# Patient Record
Sex: FEMALE | Race: WHITE | NOT HISPANIC OR LATINO | Employment: OTHER | URBAN - METROPOLITAN AREA
[De-identification: names, ages, dates, MRNs, and addresses within clinical notes are randomized per-mention and may not be internally consistent; named-entity substitution may affect disease eponyms.]

---

## 2015-01-16 LAB — CRC RECOMMENDATION EXT: NORMAL

## 2017-01-03 ENCOUNTER — HISTORICAL (OUTPATIENT)
Dept: URGENT CARE | Facility: CLINIC | Age: 67
End: 2017-01-03

## 2017-01-24 ENCOUNTER — HISTORICAL (OUTPATIENT)
Dept: LAB | Facility: HOSPITAL | Age: 67
End: 2017-01-24

## 2017-05-01 ENCOUNTER — HISTORICAL (OUTPATIENT)
Dept: LAB | Facility: HOSPITAL | Age: 67
End: 2017-05-01

## 2017-05-01 LAB
ALBUMIN SERPL-MCNC: 4.2 GM/DL (ref 3.4–5)
ALBUMIN/GLOB SERPL: 1.4 {RATIO}
ALP SERPL-CCNC: 68 UNIT/L (ref 38–126)
ALT SERPL-CCNC: 21 UNIT/L (ref 12–78)
AST SERPL-CCNC: 8 UNIT/L (ref 15–37)
BILIRUB SERPL-MCNC: 0.4 MG/DL (ref 0.2–1)
BILIRUBIN DIRECT+TOT PNL SERPL-MCNC: 0.1 MG/DL (ref 0–0.2)
BILIRUBIN DIRECT+TOT PNL SERPL-MCNC: 0.3 MG/DL (ref 0–0.8)
BUN SERPL-MCNC: 12 MG/DL (ref 7–18)
CALCIUM SERPL-MCNC: 8.9 MG/DL (ref 8.5–10.1)
CHLORIDE SERPL-SCNC: 107 MMOL/L (ref 98–107)
CHOLEST SERPL-MCNC: 179 MG/DL (ref 0–200)
CHOLEST/HDLC SERPL: 2.2 {RATIO} (ref 0–4)
CO2 SERPL-SCNC: 30 MMOL/L (ref 21–32)
CREAT SERPL-MCNC: 0.79 MG/DL (ref 0.55–1.02)
GLOBULIN SER-MCNC: 2.9 GM/DL (ref 2.4–3.5)
GLUCOSE SERPL-MCNC: 92 MG/DL (ref 74–106)
HDLC SERPL-MCNC: 81 MG/DL (ref 35–60)
LDLC SERPL CALC-MCNC: 78 MG/DL (ref 0–129)
POTASSIUM SERPL-SCNC: 4.5 MMOL/L (ref 3.5–5.1)
PROT SERPL-MCNC: 7.1 GM/DL (ref 6.4–8.2)
SODIUM SERPL-SCNC: 142 MMOL/L (ref 136–145)
TRIGL SERPL-MCNC: 102 MG/DL (ref 30–150)
VLDLC SERPL CALC-MCNC: 20 MG/DL

## 2017-09-07 ENCOUNTER — HISTORICAL (OUTPATIENT)
Dept: LAB | Facility: HOSPITAL | Age: 67
End: 2017-09-07

## 2017-09-07 LAB
ABS NEUT (OLG): 4.78 X10(3)/MCL (ref 2.1–9.2)
ALBUMIN SERPL-MCNC: 3.6 GM/DL (ref 3.4–5)
ALBUMIN/GLOB SERPL: 1 RATIO (ref 1.1–2)
ALP SERPL-CCNC: 79 UNIT/L (ref 38–126)
ALT SERPL-CCNC: 25 UNIT/L (ref 12–78)
AST SERPL-CCNC: 17 UNIT/L (ref 15–37)
BASOPHILS # BLD AUTO: 0 X10(3)/MCL (ref 0–0.2)
BASOPHILS NFR BLD AUTO: 0 %
BILIRUB SERPL-MCNC: 0.6 MG/DL (ref 0.2–1)
BILIRUBIN DIRECT+TOT PNL SERPL-MCNC: 0.1 MG/DL (ref 0–0.5)
BILIRUBIN DIRECT+TOT PNL SERPL-MCNC: 0.5 MG/DL (ref 0–0.8)
BUN SERPL-MCNC: 15 MG/DL (ref 7–18)
CALCIUM SERPL-MCNC: 9.1 MG/DL (ref 8.5–10.1)
CHLORIDE SERPL-SCNC: 104 MMOL/L (ref 98–107)
CHOLEST SERPL-MCNC: 258 MG/DL (ref 0–200)
CHOLEST/HDLC SERPL: 5.1 {RATIO} (ref 0–4)
CO2 SERPL-SCNC: 28 MMOL/L (ref 21–32)
CREAT SERPL-MCNC: 0.94 MG/DL (ref 0.55–1.02)
EOSINOPHIL # BLD AUTO: 0.2 X10(3)/MCL (ref 0–0.9)
EOSINOPHIL NFR BLD AUTO: 2 %
ERYTHROCYTE [DISTWIDTH] IN BLOOD BY AUTOMATED COUNT: 13.2 % (ref 11.5–17)
EST. AVERAGE GLUCOSE BLD GHB EST-MCNC: 163 MG/DL
GLOBULIN SER-MCNC: 3.7 GM/DL (ref 2.4–3.5)
GLUCOSE SERPL-MCNC: 132 MG/DL (ref 74–106)
HBA1C MFR BLD: 7.3 % (ref 4.2–6.3)
HCT VFR BLD AUTO: 38.6 % (ref 37–47)
HDLC SERPL-MCNC: 51 MG/DL (ref 35–60)
HGB BLD-MCNC: 12.3 GM/DL (ref 12–16)
LDLC SERPL CALC-MCNC: 167 MG/DL (ref 0–129)
LYMPHOCYTES # BLD AUTO: 2.7 X10(3)/MCL (ref 0.6–4.6)
LYMPHOCYTES NFR BLD AUTO: 33 %
MCH RBC QN AUTO: 31.3 PG (ref 27–31)
MCHC RBC AUTO-ENTMCNC: 31.9 GM/DL (ref 33–36)
MCV RBC AUTO: 98.2 FL (ref 80–94)
MONOCYTES # BLD AUTO: 0.5 X10(3)/MCL (ref 0.1–1.3)
MONOCYTES NFR BLD AUTO: 6 %
NEUTROPHILS # BLD AUTO: 4.78 X10(3)/MCL (ref 2.1–9.2)
NEUTROPHILS NFR BLD AUTO: 58 %
PLATELET # BLD AUTO: 191 X10(3)/MCL (ref 130–400)
PMV BLD AUTO: 8.8 FL (ref 9.4–12.4)
POTASSIUM SERPL-SCNC: 4.2 MMOL/L (ref 3.5–5.1)
PROT SERPL-MCNC: 7.3 GM/DL (ref 6.4–8.2)
RBC # BLD AUTO: 3.93 X10(6)/MCL (ref 4.2–5.4)
SODIUM SERPL-SCNC: 141 MMOL/L (ref 136–145)
TRIGL SERPL-MCNC: 200 MG/DL (ref 30–150)
TSH SERPL-ACNC: 1.05 MIU/ML (ref 0.36–3.74)
VLDLC SERPL CALC-MCNC: 40 MG/DL
WBC # SPEC AUTO: 8.2 X10(3)/MCL (ref 4.5–11.5)

## 2017-11-09 ENCOUNTER — HISTORICAL (OUTPATIENT)
Dept: LAB | Facility: HOSPITAL | Age: 67
End: 2017-11-09

## 2017-11-09 LAB
ABS NEUT (OLG): 3.99 X10(3)/MCL (ref 2.1–9.2)
ALBUMIN SERPL-MCNC: 4.4 GM/DL (ref 3.4–5)
ALBUMIN/GLOB SERPL: 1.4 {RATIO}
ALP SERPL-CCNC: 75 UNIT/L (ref 38–126)
ALT SERPL-CCNC: 38 UNIT/L (ref 12–78)
APPEARANCE, UA: CLEAR
AST SERPL-CCNC: 18 UNIT/L (ref 15–37)
BACTERIA SPEC CULT: ABNORMAL /HPF
BASOPHILS # BLD AUTO: 0 X10(3)/MCL (ref 0–0.2)
BASOPHILS NFR BLD AUTO: 0 %
BILIRUB SERPL-MCNC: 0.5 MG/DL (ref 0.2–1)
BILIRUB UR QL STRIP: NEGATIVE
BILIRUBIN DIRECT+TOT PNL SERPL-MCNC: 0.1 MG/DL (ref 0–0.2)
BILIRUBIN DIRECT+TOT PNL SERPL-MCNC: 0.4 MG/DL (ref 0–0.8)
BUN SERPL-MCNC: 14 MG/DL (ref 7–18)
CALCIUM SERPL-MCNC: 9.4 MG/DL (ref 8.5–10.1)
CHLORIDE SERPL-SCNC: 106 MMOL/L (ref 98–107)
CHOLEST SERPL-MCNC: 199 MG/DL (ref 0–200)
CHOLEST/HDLC SERPL: 2.2 {RATIO} (ref 0–4)
CO2 SERPL-SCNC: 27 MMOL/L (ref 21–32)
COLOR UR: YELLOW
CREAT SERPL-MCNC: 0.84 MG/DL (ref 0.55–1.02)
EOSINOPHIL # BLD AUTO: 0 X10(3)/MCL (ref 0–0.9)
EOSINOPHIL NFR BLD AUTO: 0 %
ERYTHROCYTE [DISTWIDTH] IN BLOOD BY AUTOMATED COUNT: 13.2 % (ref 11.5–17)
GLOBULIN SER-MCNC: 3.1 GM/DL (ref 2.4–3.5)
GLUCOSE (UA): NEGATIVE
GLUCOSE SERPL-MCNC: 93 MG/DL (ref 74–106)
HCT VFR BLD AUTO: 41.5 % (ref 37–47)
HDLC SERPL-MCNC: 90 MG/DL (ref 35–60)
HGB BLD-MCNC: 13.3 GM/DL (ref 12–16)
HGB UR QL STRIP: ABNORMAL
KETONES UR QL STRIP: NEGATIVE
LDLC SERPL CALC-MCNC: 86 MG/DL (ref 0–129)
LEUKOCYTE ESTERASE UR QL STRIP: NEGATIVE
LYMPHOCYTES # BLD AUTO: 1.9 X10(3)/MCL (ref 0.6–4.6)
LYMPHOCYTES NFR BLD AUTO: 31 %
MCH RBC QN AUTO: 28.2 PG (ref 27–31)
MCHC RBC AUTO-ENTMCNC: 32 GM/DL (ref 33–36)
MCV RBC AUTO: 88.1 FL (ref 80–94)
MONOCYTES # BLD AUTO: 0.3 X10(3)/MCL (ref 0.1–1.3)
MONOCYTES NFR BLD AUTO: 5 %
NEUTROPHILS # BLD AUTO: 3.99 X10(3)/MCL (ref 2.1–9.2)
NEUTROPHILS NFR BLD AUTO: 64 %
NITRITE UR QL STRIP: NEGATIVE
PH UR STRIP: 5.5 [PH] (ref 5–9)
PLATELET # BLD AUTO: 248 X10(3)/MCL (ref 130–400)
PMV BLD AUTO: 10.3 FL (ref 9.4–12.4)
POTASSIUM SERPL-SCNC: 4.2 MMOL/L (ref 3.5–5.1)
PROT SERPL-MCNC: 7.5 GM/DL (ref 6.4–8.2)
PROT UR QL STRIP: NEGATIVE
RBC # BLD AUTO: 4.71 X10(6)/MCL (ref 4.2–5.4)
RBC #/AREA URNS HPF: ABNORMAL /[HPF]
SODIUM SERPL-SCNC: 142 MMOL/L (ref 136–145)
SP GR UR STRIP: 1.01 (ref 1–1.03)
SQUAMOUS EPITHELIAL, UA: ABNORMAL
TRIGL SERPL-MCNC: 114 MG/DL (ref 30–150)
TSH SERPL-ACNC: 1.38 MIU/ML (ref 0.36–3.74)
UA WBC MAN: ABNORMAL
UROBILINOGEN UR STRIP-ACNC: 0.2
VLDLC SERPL CALC-MCNC: 23 MG/DL
WBC # SPEC AUTO: 6.3 X10(3)/MCL (ref 4.5–11.5)

## 2017-12-04 ENCOUNTER — HISTORICAL (OUTPATIENT)
Dept: LAB | Facility: HOSPITAL | Age: 67
End: 2017-12-04

## 2017-12-04 LAB
ABS NEUT (OLG): 3.12 X10(3)/MCL (ref 2.1–9.2)
BASOPHILS # BLD AUTO: 0 X10(3)/MCL (ref 0–0.2)
BASOPHILS NFR BLD AUTO: 0 %
CHOLEST SERPL-MCNC: 231 MG/DL (ref 0–200)
CHOLEST/HDLC SERPL: 4.9 {RATIO} (ref 0–4)
EOSINOPHIL # BLD AUTO: 0.2 X10(3)/MCL (ref 0–0.9)
EOSINOPHIL NFR BLD AUTO: 3 %
ERYTHROCYTE [DISTWIDTH] IN BLOOD BY AUTOMATED COUNT: 13.9 % (ref 11.5–17)
EST. AVERAGE GLUCOSE BLD GHB EST-MCNC: 128 MG/DL
HBA1C MFR BLD: 6.1 % (ref 4.2–6.3)
HCT VFR BLD AUTO: 38.4 % (ref 37–47)
HDLC SERPL-MCNC: 47 MG/DL (ref 35–60)
HGB BLD-MCNC: 12 GM/DL (ref 12–16)
LDLC SERPL CALC-MCNC: 160 MG/DL (ref 0–129)
LYMPHOCYTES # BLD AUTO: 2 X10(3)/MCL (ref 0.6–4.6)
LYMPHOCYTES NFR BLD AUTO: 35 %
MCH RBC QN AUTO: 30.9 PG (ref 27–31)
MCHC RBC AUTO-ENTMCNC: 31.3 GM/DL (ref 33–36)
MCV RBC AUTO: 99 FL (ref 80–94)
MONOCYTES # BLD AUTO: 0.6 X10(3)/MCL (ref 0.1–1.3)
MONOCYTES NFR BLD AUTO: 9 %
NEUTROPHILS # BLD AUTO: 3.12 X10(3)/MCL (ref 1.4–7.9)
NEUTROPHILS NFR BLD AUTO: 53 %
PLATELET # BLD AUTO: 176 X10(3)/MCL (ref 130–400)
PMV BLD AUTO: 8.8 FL (ref 9.4–12.4)
RBC # BLD AUTO: 3.88 X10(6)/MCL (ref 4.2–5.4)
TRIGL SERPL-MCNC: 120 MG/DL (ref 30–150)
VLDLC SERPL CALC-MCNC: 24 MG/DL
WBC # SPEC AUTO: 5.9 X10(3)/MCL (ref 4.5–11.5)

## 2018-03-05 ENCOUNTER — HISTORICAL (OUTPATIENT)
Dept: LAB | Facility: HOSPITAL | Age: 68
End: 2018-03-05

## 2018-04-06 ENCOUNTER — HISTORICAL (OUTPATIENT)
Dept: ADMINISTRATIVE | Facility: HOSPITAL | Age: 68
End: 2018-04-06

## 2018-05-09 ENCOUNTER — HISTORICAL (OUTPATIENT)
Dept: LAB | Facility: HOSPITAL | Age: 68
End: 2018-05-09

## 2018-05-09 LAB
EST. AVERAGE GLUCOSE BLD GHB EST-MCNC: 128 MG/DL
HBA1C MFR BLD: 6.1 % (ref 4.2–6.3)

## 2018-05-10 ENCOUNTER — HISTORICAL (OUTPATIENT)
Dept: LAB | Facility: HOSPITAL | Age: 68
End: 2018-05-10

## 2018-05-13 LAB — RAPID GROUP A STREP (OHS): POSITIVE

## 2018-06-01 ENCOUNTER — HISTORICAL (OUTPATIENT)
Dept: LAB | Facility: HOSPITAL | Age: 68
End: 2018-06-01

## 2018-07-16 ENCOUNTER — HISTORICAL (OUTPATIENT)
Dept: INFUSION THERAPY | Facility: HOSPITAL | Age: 68
End: 2018-07-16

## 2018-09-11 ENCOUNTER — HISTORICAL (OUTPATIENT)
Dept: LAB | Facility: HOSPITAL | Age: 68
End: 2018-09-11

## 2018-09-25 ENCOUNTER — HISTORICAL (OUTPATIENT)
Dept: HEMATOLOGY/ONCOLOGY | Facility: CLINIC | Age: 68
End: 2018-09-25

## 2018-11-16 ENCOUNTER — HISTORICAL (OUTPATIENT)
Dept: LAB | Facility: HOSPITAL | Age: 68
End: 2018-11-16

## 2018-11-16 LAB
ABS NEUT (OLG): 3.09 X10(3)/MCL (ref 2.1–9.2)
ALBUMIN SERPL-MCNC: 4.1 GM/DL (ref 3.4–5)
ALBUMIN/GLOB SERPL: 1.4 {RATIO}
ALP SERPL-CCNC: 76 UNIT/L (ref 38–126)
ALT SERPL-CCNC: 48 UNIT/L (ref 12–78)
APPEARANCE, UA: CLEAR
AST SERPL-CCNC: 18 UNIT/L (ref 15–37)
BACTERIA SPEC CULT: ABNORMAL /HPF
BASOPHILS # BLD AUTO: 0 X10(3)/MCL (ref 0–0.2)
BASOPHILS NFR BLD AUTO: 0 %
BILIRUB SERPL-MCNC: 0.4 MG/DL (ref 0.2–1)
BILIRUB UR QL STRIP: NEGATIVE
BILIRUBIN DIRECT+TOT PNL SERPL-MCNC: 0.1 MG/DL (ref 0–0.2)
BILIRUBIN DIRECT+TOT PNL SERPL-MCNC: 0.3 MG/DL (ref 0–0.8)
BUN SERPL-MCNC: 10 MG/DL (ref 7–18)
CALCIUM SERPL-MCNC: 8.9 MG/DL (ref 8.5–10.1)
CHLORIDE SERPL-SCNC: 109 MMOL/L (ref 98–107)
CHOLEST SERPL-MCNC: 172 MG/DL (ref 0–200)
CHOLEST/HDLC SERPL: 2.1 {RATIO} (ref 0–4)
CO2 SERPL-SCNC: 29 MMOL/L (ref 21–32)
COLOR UR: YELLOW
CREAT SERPL-MCNC: 0.86 MG/DL (ref 0.55–1.02)
EOSINOPHIL # BLD AUTO: 0 X10(3)/MCL (ref 0–0.9)
EOSINOPHIL NFR BLD AUTO: 0 %
ERYTHROCYTE [DISTWIDTH] IN BLOOD BY AUTOMATED COUNT: 14.3 % (ref 11.5–17)
GLOBULIN SER-MCNC: 2.9 GM/DL (ref 2.4–3.5)
GLUCOSE (UA): NEGATIVE
GLUCOSE SERPL-MCNC: 88 MG/DL (ref 74–106)
HCT VFR BLD AUTO: 40.9 % (ref 37–47)
HDLC SERPL-MCNC: 81 MG/DL (ref 35–60)
HGB BLD-MCNC: 12.7 GM/DL (ref 12–16)
HGB UR QL STRIP: ABNORMAL
KETONES UR QL STRIP: NEGATIVE
LDLC SERPL CALC-MCNC: 69 MG/DL (ref 0–129)
LEUKOCYTE ESTERASE UR QL STRIP: NEGATIVE
LYMPHOCYTES # BLD AUTO: 2 X10(3)/MCL (ref 0.6–4.6)
LYMPHOCYTES NFR BLD AUTO: 37 %
MCH RBC QN AUTO: 27.1 PG (ref 27–31)
MCHC RBC AUTO-ENTMCNC: 31.1 GM/DL (ref 33–36)
MCV RBC AUTO: 87.4 FL (ref 80–94)
MONOCYTES # BLD AUTO: 0.4 X10(3)/MCL (ref 0.1–1.3)
MONOCYTES NFR BLD AUTO: 6 %
NEUTROPHILS # BLD AUTO: 3.09 X10(3)/MCL (ref 2.1–9.2)
NEUTROPHILS NFR BLD AUTO: 56 %
NITRITE UR QL STRIP: NEGATIVE
PH UR STRIP: 7 [PH] (ref 5–9)
PLATELET # BLD AUTO: 253 X10(3)/MCL (ref 130–400)
PMV BLD AUTO: 10.9 FL (ref 9.4–12.4)
POTASSIUM SERPL-SCNC: 4.9 MMOL/L (ref 3.5–5.1)
PROT SERPL-MCNC: 7 GM/DL (ref 6.4–8.2)
PROT UR QL STRIP: NEGATIVE
RBC # BLD AUTO: 4.68 X10(6)/MCL (ref 4.2–5.4)
RBC #/AREA URNS HPF: ABNORMAL /[HPF]
SODIUM SERPL-SCNC: 142 MMOL/L (ref 136–145)
SP GR UR STRIP: 1.01 (ref 1–1.03)
SQUAMOUS EPITHELIAL, UA: 6 /HPF (ref 0–4)
TRIGL SERPL-MCNC: 111 MG/DL (ref 30–150)
TSH SERPL-ACNC: 1.35 MIU/L (ref 0.36–3.74)
UROBILINOGEN UR STRIP-ACNC: 0.2
VLDLC SERPL CALC-MCNC: 22 MG/DL
WBC # SPEC AUTO: 5.6 X10(3)/MCL (ref 4.5–11.5)
WBC #/AREA URNS HPF: ABNORMAL /[HPF]

## 2019-01-21 ENCOUNTER — HISTORICAL (OUTPATIENT)
Dept: LAB | Facility: HOSPITAL | Age: 69
End: 2019-01-21

## 2019-01-21 ENCOUNTER — HISTORICAL (OUTPATIENT)
Dept: ADMINISTRATIVE | Facility: HOSPITAL | Age: 69
End: 2019-01-21

## 2019-01-21 LAB
ALBUMIN SERPL-MCNC: 3.5 GM/DL (ref 3.4–5)
ALBUMIN/GLOB SERPL: 0.9 {RATIO}
ALP SERPL-CCNC: 68 UNIT/L (ref 38–126)
ALT SERPL-CCNC: 23 UNIT/L (ref 12–78)
APPEARANCE, UA: ABNORMAL
AST SERPL-CCNC: 13 UNIT/L (ref 15–37)
BACTERIA SPEC CULT: ABNORMAL /HPF
BILIRUB SERPL-MCNC: 0.5 MG/DL (ref 0.2–1)
BILIRUB UR QL STRIP: NEGATIVE
BILIRUBIN DIRECT+TOT PNL SERPL-MCNC: 0.1 MG/DL (ref 0–0.2)
BILIRUBIN DIRECT+TOT PNL SERPL-MCNC: 0.4 MG/DL (ref 0–0.8)
BUN SERPL-MCNC: 18 MG/DL (ref 7–18)
CALCIUM SERPL-MCNC: 9 MG/DL (ref 8.5–10.1)
CHLORIDE SERPL-SCNC: 105 MMOL/L (ref 98–107)
CO2 SERPL-SCNC: 29 MMOL/L (ref 21–32)
COLOR UR: YELLOW
CREAT SERPL-MCNC: 0.95 MG/DL (ref 0.55–1.02)
CREAT UR-MCNC: 106 MG/DL
EST. AVERAGE GLUCOSE BLD GHB EST-MCNC: 163 MG/DL
GLOBULIN SER-MCNC: 3.7 GM/DL (ref 2.4–3.5)
GLUCOSE (UA): NEGATIVE
GLUCOSE SERPL-MCNC: 123 MG/DL (ref 74–106)
HBA1C MFR BLD: 7.3 % (ref 4.2–6.3)
HGB UR QL STRIP: ABNORMAL
KETONES UR QL STRIP: NEGATIVE
LEUKOCYTE ESTERASE UR QL STRIP: ABNORMAL
MICROALBUMIN UR-MCNC: 2.3 MG/DL
MICROALBUMIN/CREAT RATIO PNL UR: 21.7 MG/GM CR (ref 0–30)
NITRITE UR QL STRIP: NEGATIVE
PH UR STRIP: 6 [PH] (ref 5–9)
POTASSIUM SERPL-SCNC: 4 MMOL/L (ref 3.5–5.1)
PROT SERPL-MCNC: 7.2 GM/DL (ref 6.4–8.2)
PROT UR QL STRIP: NEGATIVE
RBC #/AREA URNS HPF: 18 /HPF (ref 0–2)
SODIUM SERPL-SCNC: 142 MMOL/L (ref 136–145)
SP GR UR STRIP: 1.02 (ref 1–1.03)
SQUAMOUS EPITHELIAL, UA: 9 /HPF (ref 0–4)
TSH SERPL-ACNC: 0.16 MIU/L (ref 0.36–3.74)
UROBILINOGEN UR STRIP-ACNC: 1
WBC #/AREA URNS HPF: 105 /HPF (ref 0–3)

## 2019-02-05 ENCOUNTER — HISTORICAL (OUTPATIENT)
Dept: INFUSION THERAPY | Facility: HOSPITAL | Age: 69
End: 2019-02-05

## 2019-02-07 ENCOUNTER — HISTORICAL (OUTPATIENT)
Dept: RADIOLOGY | Facility: HOSPITAL | Age: 69
End: 2019-02-07

## 2019-02-21 ENCOUNTER — HISTORICAL (OUTPATIENT)
Dept: RADIOLOGY | Facility: HOSPITAL | Age: 69
End: 2019-02-21

## 2019-04-03 ENCOUNTER — HISTORICAL (OUTPATIENT)
Dept: ADMINISTRATIVE | Facility: HOSPITAL | Age: 69
End: 2019-04-03

## 2019-04-03 LAB
ABS NEUT (OLG): 4.07 X10(3)/MCL (ref 2.1–9.2)
ALBUMIN SERPL-MCNC: 3.7 GM/DL (ref 3.4–5)
ALBUMIN/GLOB SERPL: 1 RATIO (ref 1.1–2)
ALP SERPL-CCNC: 78 UNIT/L (ref 38–126)
ALT SERPL-CCNC: 26 UNIT/L (ref 12–78)
AST SERPL-CCNC: 17 UNIT/L (ref 15–37)
BASOPHILS # BLD AUTO: 0 X10(3)/MCL (ref 0–0.2)
BASOPHILS NFR BLD AUTO: 0.4 %
BILIRUB SERPL-MCNC: 0.6 MG/DL (ref 0.2–1)
BILIRUBIN DIRECT+TOT PNL SERPL-MCNC: 0.2 MG/DL (ref 0–0.5)
BILIRUBIN DIRECT+TOT PNL SERPL-MCNC: 0.4 MG/DL (ref 0–0.8)
BUN SERPL-MCNC: 23 MG/DL (ref 7–18)
CALCIUM SERPL-MCNC: 9.2 MG/DL (ref 8.5–10.1)
CHLORIDE SERPL-SCNC: 106 MMOL/L (ref 98–107)
CO2 SERPL-SCNC: 31 MMOL/L (ref 21–32)
CREAT SERPL-MCNC: 1.15 MG/DL (ref 0.55–1.02)
EOSINOPHIL # BLD AUTO: 0.1 X10(3)/MCL (ref 0–0.9)
EOSINOPHIL NFR BLD AUTO: 1.6 %
ERYTHROCYTE [DISTWIDTH] IN BLOOD BY AUTOMATED COUNT: 13.3 % (ref 11.5–17)
GLOBULIN SER-MCNC: 3.7 GM/DL (ref 2.4–3.5)
GLUCOSE SERPL-MCNC: 106 MG/DL (ref 74–106)
HCT VFR BLD AUTO: 38.9 % (ref 37–47)
HGB BLD-MCNC: 12.4 GM/DL (ref 12–16)
LYMPHOCYTES # BLD AUTO: 3.1 X10(3)/MCL (ref 0.6–4.6)
LYMPHOCYTES NFR BLD AUTO: 39.7 %
MCH RBC QN AUTO: 31.1 PG (ref 27–31)
MCHC RBC AUTO-ENTMCNC: 31.9 GM/DL (ref 33–36)
MCV RBC AUTO: 97.5 FL (ref 80–94)
MONOCYTES # BLD AUTO: 0.5 X10(3)/MCL (ref 0.1–1.3)
MONOCYTES NFR BLD AUTO: 6.6 %
NEUTROPHILS # BLD AUTO: 4.1 X10(3)/MCL (ref 2.1–9.2)
NEUTROPHILS NFR BLD AUTO: 51.6 %
PLATELET # BLD AUTO: 230 X10(3)/MCL (ref 130–400)
PMV BLD AUTO: 8.6 FL (ref 9.4–12.4)
POTASSIUM SERPL-SCNC: 4 MMOL/L (ref 3.5–5.1)
PROT SERPL-MCNC: 7.4 GM/DL (ref 6.4–8.2)
RBC # BLD AUTO: 3.99 X10(6)/MCL (ref 4.2–5.4)
SODIUM SERPL-SCNC: 141 MMOL/L (ref 136–145)
WBC # SPEC AUTO: 7.9 X10(3)/MCL (ref 4.5–11.5)

## 2019-05-17 ENCOUNTER — HISTORICAL (OUTPATIENT)
Dept: LAB | Facility: HOSPITAL | Age: 69
End: 2019-05-17

## 2019-05-17 LAB
ALBUMIN SERPL-MCNC: 4.3 GM/DL (ref 3.4–5)
ALBUMIN/GLOB SERPL: 1.3 RATIO (ref 1.1–2)
ALP SERPL-CCNC: 74 UNIT/L (ref 38–126)
ALT SERPL-CCNC: 55 UNIT/L (ref 12–78)
AST SERPL-CCNC: 22 UNIT/L (ref 15–37)
BILIRUB SERPL-MCNC: 0.5 MG/DL (ref 0.2–1)
BILIRUBIN DIRECT+TOT PNL SERPL-MCNC: 0.1 MG/DL (ref 0–0.5)
BILIRUBIN DIRECT+TOT PNL SERPL-MCNC: 0.4 MG/DL (ref 0–0.8)
BUN SERPL-MCNC: 12 MG/DL (ref 7–18)
CALCIUM SERPL-MCNC: 10.1 MG/DL (ref 8.5–10.1)
CHLORIDE SERPL-SCNC: 110 MMOL/L (ref 98–107)
CHOLEST SERPL-MCNC: 208 MG/DL (ref 0–200)
CHOLEST/HDLC SERPL: 2.5 {RATIO} (ref 0–4)
CO2 SERPL-SCNC: 31 MMOL/L (ref 21–32)
CREAT SERPL-MCNC: 1.01 MG/DL (ref 0.55–1.02)
GLOBULIN SER-MCNC: 3.2 GM/DL (ref 2.4–3.5)
GLUCOSE SERPL-MCNC: 102 MG/DL (ref 74–106)
HDLC SERPL-MCNC: 83 MG/DL (ref 35–60)
LDLC SERPL CALC-MCNC: 98 MG/DL (ref 0–129)
POTASSIUM SERPL-SCNC: 5.8 MMOL/L (ref 3.5–5.1)
PROT SERPL-MCNC: 7.5 GM/DL (ref 6.4–8.2)
SODIUM SERPL-SCNC: 148 MMOL/L (ref 136–145)
TRIGL SERPL-MCNC: 137 MG/DL (ref 30–150)
VLDLC SERPL CALC-MCNC: 27 MG/DL

## 2019-07-23 ENCOUNTER — HISTORICAL (OUTPATIENT)
Dept: RADIOLOGY | Facility: HOSPITAL | Age: 69
End: 2019-07-23

## 2019-10-08 ENCOUNTER — HISTORICAL (OUTPATIENT)
Dept: INFUSION THERAPY | Facility: HOSPITAL | Age: 69
End: 2019-10-08

## 2019-11-20 ENCOUNTER — HISTORICAL (OUTPATIENT)
Dept: LAB | Facility: HOSPITAL | Age: 69
End: 2019-11-20

## 2019-11-20 LAB
ABS NEUT (OLG): 3.24 X10(3)/MCL (ref 2.1–9.2)
ALBUMIN SERPL-MCNC: 4.1 GM/DL (ref 3.4–5)
ALBUMIN/GLOB SERPL: 1.4 RATIO (ref 1.1–2)
ALP SERPL-CCNC: 83 UNIT/L (ref 38–126)
ALT SERPL-CCNC: 42 UNIT/L (ref 12–78)
APPEARANCE, UA: CLEAR
AST SERPL-CCNC: 19 UNIT/L (ref 15–37)
BACTERIA SPEC CULT: ABNORMAL /HPF
BASOPHILS # BLD AUTO: 0 X10(3)/MCL (ref 0–0.2)
BASOPHILS NFR BLD AUTO: 0 %
BILIRUB SERPL-MCNC: 0.4 MG/DL (ref 0.2–1)
BILIRUB UR QL STRIP: NEGATIVE
BILIRUBIN DIRECT+TOT PNL SERPL-MCNC: 0.1 MG/DL (ref 0–0.5)
BILIRUBIN DIRECT+TOT PNL SERPL-MCNC: 0.3 MG/DL (ref 0–0.8)
BUN SERPL-MCNC: 11 MG/DL (ref 7–18)
CALCIUM SERPL-MCNC: 9 MG/DL (ref 8.5–10.1)
CHLORIDE SERPL-SCNC: 110 MMOL/L (ref 98–107)
CHOLEST SERPL-MCNC: 201 MG/DL (ref 0–200)
CHOLEST/HDLC SERPL: 2.5 {RATIO} (ref 0–4)
CO2 SERPL-SCNC: 25 MMOL/L (ref 21–32)
COLOR UR: YELLOW
CREAT SERPL-MCNC: 0.73 MG/DL (ref 0.55–1.02)
EOSINOPHIL # BLD AUTO: 0 X10(3)/MCL (ref 0–0.9)
EOSINOPHIL NFR BLD AUTO: 1 %
ERYTHROCYTE [DISTWIDTH] IN BLOOD BY AUTOMATED COUNT: 13.8 % (ref 11.5–17)
GLOBULIN SER-MCNC: 3 GM/DL (ref 2.4–3.5)
GLUCOSE (UA): NEGATIVE
GLUCOSE SERPL-MCNC: 91 MG/DL (ref 74–106)
HCT VFR BLD AUTO: 44.1 % (ref 37–47)
HDLC SERPL-MCNC: 81 MG/DL (ref 35–60)
HGB BLD-MCNC: 13.3 GM/DL (ref 12–16)
HGB UR QL STRIP: NEGATIVE
KETONES UR QL STRIP: NEGATIVE
LDLC SERPL CALC-MCNC: 94 MG/DL (ref 0–129)
LEUKOCYTE ESTERASE UR QL STRIP: NEGATIVE
LYMPHOCYTES # BLD AUTO: 1.9 X10(3)/MCL (ref 0.6–4.6)
LYMPHOCYTES NFR BLD AUTO: 34 %
MCH RBC QN AUTO: 27.5 PG (ref 27–31)
MCHC RBC AUTO-ENTMCNC: 30.2 GM/DL (ref 33–36)
MCV RBC AUTO: 91.3 FL (ref 80–94)
MONOCYTES # BLD AUTO: 0.3 X10(3)/MCL (ref 0.1–1.3)
MONOCYTES NFR BLD AUTO: 5 %
NEUTROPHILS # BLD AUTO: 3.24 X10(3)/MCL (ref 2.1–9.2)
NEUTROPHILS NFR BLD AUTO: 59 %
NITRITE UR QL STRIP: NEGATIVE
PH UR STRIP: 6 [PH] (ref 5–9)
PLATELET # BLD AUTO: 276 X10(3)/MCL (ref 130–400)
PMV BLD AUTO: 11.1 FL (ref 9.4–12.4)
POTASSIUM SERPL-SCNC: 4.3 MMOL/L (ref 3.5–5.1)
PROT SERPL-MCNC: 7.1 GM/DL (ref 6.4–8.2)
PROT UR QL STRIP: NEGATIVE
RBC # BLD AUTO: 4.83 X10(6)/MCL (ref 4.2–5.4)
RBC #/AREA URNS HPF: ABNORMAL /[HPF]
SODIUM SERPL-SCNC: 142 MMOL/L (ref 136–145)
SP GR UR STRIP: 1.01 (ref 1–1.03)
SQUAMOUS EPITHELIAL, UA: 6 /HPF (ref 0–4)
TRIGL SERPL-MCNC: 130 MG/DL (ref 30–150)
TSH SERPL-ACNC: 1.28 MIU/L (ref 0.36–3.74)
UROBILINOGEN UR STRIP-ACNC: 0.2
VLDLC SERPL CALC-MCNC: 26 MG/DL
WBC # SPEC AUTO: 5.5 X10(3)/MCL (ref 4.5–11.5)
WBC #/AREA URNS HPF: ABNORMAL /[HPF]

## 2020-11-17 ENCOUNTER — HISTORICAL (OUTPATIENT)
Dept: ADMINISTRATIVE | Facility: HOSPITAL | Age: 70
End: 2020-11-17

## 2020-11-17 LAB
ABS NEUT (OLG): 2.85 X10(3)/MCL (ref 2.1–9.2)
ALBUMIN SERPL-MCNC: 4 GM/DL (ref 3.4–4.8)
ALBUMIN/GLOB SERPL: 1.6 RATIO (ref 1.1–2)
ALP SERPL-CCNC: 65 UNIT/L (ref 40–150)
ALT SERPL-CCNC: 22 UNIT/L (ref 0–55)
APPEARANCE, UA: ABNORMAL
AST SERPL-CCNC: 12 UNIT/L (ref 5–34)
BACTERIA SPEC CULT: ABNORMAL /HPF
BASOPHILS # BLD AUTO: 0 X10(3)/MCL (ref 0–0.2)
BASOPHILS NFR BLD AUTO: 1 %
BILIRUB SERPL-MCNC: 0.3 MG/DL
BILIRUB UR QL STRIP: NEGATIVE
BILIRUBIN DIRECT+TOT PNL SERPL-MCNC: 0.1 MG/DL (ref 0–0.5)
BILIRUBIN DIRECT+TOT PNL SERPL-MCNC: 0.2 MG/DL (ref 0–0.8)
BUN SERPL-MCNC: 11.4 MG/DL (ref 9.8–20.1)
CALCIUM SERPL-MCNC: 8.6 MG/DL (ref 8.4–10.2)
CHLORIDE SERPL-SCNC: 107 MMOL/L (ref 98–107)
CHOLEST SERPL-MCNC: 188 MG/DL
CHOLEST/HDLC SERPL: 3 {RATIO} (ref 0–5)
CO2 SERPL-SCNC: 27 MMOL/L (ref 23–31)
COLOR UR: YELLOW
CREAT SERPL-MCNC: 0.86 MG/DL (ref 0.55–1.02)
EOSINOPHIL # BLD AUTO: 0.1 X10(3)/MCL (ref 0–0.9)
EOSINOPHIL NFR BLD AUTO: 1 %
ERYTHROCYTE [DISTWIDTH] IN BLOOD BY AUTOMATED COUNT: 15.1 % (ref 11.5–17)
GLOBULIN SER-MCNC: 2.5 GM/DL (ref 2.4–3.5)
GLUCOSE (UA): NEGATIVE
GLUCOSE SERPL-MCNC: 110 MG/DL (ref 82–115)
HCT VFR BLD AUTO: 38.6 % (ref 37–47)
HCV AB SERPL QL IA: NONREACTIVE
HDLC SERPL-MCNC: 66 MG/DL (ref 35–60)
HGB BLD-MCNC: 11.7 GM/DL (ref 12–16)
HGB UR QL STRIP: NEGATIVE
KETONES UR QL STRIP: NEGATIVE
LDLC SERPL CALC-MCNC: 98 MG/DL (ref 50–140)
LEUKOCYTE ESTERASE UR QL STRIP: NEGATIVE
LYMPHOCYTES # BLD AUTO: 1.8 X10(3)/MCL (ref 0.6–4.6)
LYMPHOCYTES NFR BLD AUTO: 34 %
MCH RBC QN AUTO: 27.3 PG (ref 27–31)
MCHC RBC AUTO-ENTMCNC: 30.3 GM/DL (ref 33–36)
MCV RBC AUTO: 90.2 FL (ref 80–94)
MONOCYTES # BLD AUTO: 0.4 X10(3)/MCL (ref 0.1–1.3)
MONOCYTES NFR BLD AUTO: 8 %
NEUTROPHILS # BLD AUTO: 2.85 X10(3)/MCL (ref 2.1–9.2)
NEUTROPHILS NFR BLD AUTO: 56 %
NITRITE UR QL STRIP: NEGATIVE
PH UR STRIP: 7 [PH] (ref 5–9)
PLATELET # BLD AUTO: 260 X10(3)/MCL (ref 130–400)
PMV BLD AUTO: 10.2 FL (ref 9.4–12.4)
POTASSIUM SERPL-SCNC: 4.6 MMOL/L (ref 3.5–5.1)
PROT SERPL-MCNC: 6.5 GM/DL (ref 5.8–7.6)
PROT UR QL STRIP: NEGATIVE
RBC # BLD AUTO: 4.28 X10(6)/MCL (ref 4.2–5.4)
RBC #/AREA URNS HPF: ABNORMAL /[HPF]
SODIUM SERPL-SCNC: 142 MMOL/L (ref 136–145)
SP GR UR STRIP: 1.02 (ref 1–1.03)
SQUAMOUS EPITHELIAL, UA: 20 /HPF (ref 0–4)
TRIGL SERPL-MCNC: 120 MG/DL (ref 37–140)
TSH SERPL-ACNC: 1.97 UIU/ML (ref 0.35–4.94)
UROBILINOGEN UR STRIP-ACNC: 0.2
VLDLC SERPL CALC-MCNC: 24 MG/DL
WBC # SPEC AUTO: 5.1 X10(3)/MCL (ref 4.5–11.5)
WBC #/AREA URNS HPF: ABNORMAL /[HPF]

## 2020-12-01 ENCOUNTER — HISTORICAL (OUTPATIENT)
Dept: ADMINISTRATIVE | Facility: HOSPITAL | Age: 70
End: 2020-12-01

## 2020-12-03 LAB — FINAL CULTURE: NORMAL

## 2020-12-04 ENCOUNTER — HISTORICAL (OUTPATIENT)
Dept: RADIOLOGY | Facility: HOSPITAL | Age: 70
End: 2020-12-04

## 2020-12-29 ENCOUNTER — HISTORICAL (OUTPATIENT)
Dept: INFUSION THERAPY | Facility: HOSPITAL | Age: 70
End: 2020-12-29

## 2021-01-05 ENCOUNTER — HISTORICAL (OUTPATIENT)
Dept: RADIOLOGY | Facility: HOSPITAL | Age: 71
End: 2021-01-05

## 2021-04-08 LAB
LEFT EYE DM RETINOPATHY: NEGATIVE
RIGHT EYE DM RETINOPATHY: NEGATIVE

## 2021-04-12 ENCOUNTER — HISTORICAL (OUTPATIENT)
Dept: ADMINISTRATIVE | Facility: HOSPITAL | Age: 71
End: 2021-04-12

## 2021-04-12 LAB — SARS-COV-2 RNA RESP QL NAA+PROBE: NOT DETECTED

## 2021-04-27 ENCOUNTER — HISTORICAL (OUTPATIENT)
Dept: LAB | Facility: HOSPITAL | Age: 71
End: 2021-04-27

## 2021-04-27 LAB
ABS NEUT (OLG): 6.17 X10(3)/MCL (ref 2.1–9.2)
ALBUMIN SERPL-MCNC: 4.1 GM/DL (ref 3.4–4.8)
ALBUMIN/GLOB SERPL: 1.1 RATIO (ref 1.1–2)
ALP SERPL-CCNC: 64 UNIT/L (ref 40–150)
ALT SERPL-CCNC: 29 UNIT/L (ref 0–55)
AST SERPL-CCNC: 25 UNIT/L (ref 5–34)
BASOPHILS # BLD AUTO: 0.05 X10(3)/MCL (ref 0–0.2)
BASOPHILS NFR BLD AUTO: 0.4 % (ref 0–1)
BILIRUB SERPL-MCNC: 0.5 MG/DL (ref 0.2–1.2)
BILIRUBIN DIRECT+TOT PNL SERPL-MCNC: 0.2 MG/DL (ref 0–0.5)
BILIRUBIN DIRECT+TOT PNL SERPL-MCNC: 0.3 MG/DL (ref 0–0.8)
BUN SERPL-MCNC: 33.1 MG/DL (ref 9.8–20.1)
CALCIUM SERPL-MCNC: 10.2 MG/DL (ref 8.4–10.2)
CHLORIDE SERPL-SCNC: 102 MMOL/L (ref 98–107)
CO2 SERPL-SCNC: 28 MMOL/L (ref 23–31)
CREAT SERPL-MCNC: 1.62 MG/DL (ref 0.57–1.11)
EOSINOPHIL # BLD AUTO: 0.13 X10(3)/MCL (ref 0–0.9)
EOSINOPHIL NFR BLD AUTO: 1.2 % (ref 0–6.4)
ERYTHROCYTE [DISTWIDTH] IN BLOOD BY AUTOMATED COUNT: 13.1 % (ref 11.5–17)
EST. AVERAGE GLUCOSE BLD GHB EST-MCNC: 171.4 MG/DL
GLOBULIN SER-MCNC: 3.9 GM/DL (ref 2.4–3.5)
GLUCOSE SERPL-MCNC: 107 MG/DL (ref 82–115)
HBA1C MFR BLD: 7.6 %
HCT VFR BLD AUTO: 37.4 % (ref 37–47)
HGB BLD-MCNC: 12.4 GM/DL (ref 12–16)
IMM GRANULOCYTES # BLD AUTO: 0.05 10*3/UL (ref 0–0.02)
IMM GRANULOCYTES NFR BLD AUTO: 0.4 % (ref 0–0.43)
LYMPHOCYTES # BLD AUTO: 3.93 X10(3)/MCL (ref 0.6–4.6)
LYMPHOCYTES NFR BLD AUTO: 35.3 % (ref 16–44)
MCH RBC QN AUTO: 32.4 PG (ref 27–31)
MCHC RBC AUTO-ENTMCNC: 33.2 GM/DL (ref 33–36)
MCV RBC AUTO: 97.7 FL (ref 80–94)
MONOCYTES # BLD AUTO: 0.79 X10(3)/MCL (ref 0.1–1.3)
MONOCYTES NFR BLD AUTO: 7.1 % (ref 4–12.1)
NEUTROPHILS # BLD AUTO: 6.17 X10(3)/MCL (ref 2.1–9.2)
NEUTROPHILS NFR BLD AUTO: 55.6 % (ref 43–73)
NRBC BLD AUTO-RTO: 0 % (ref 0–0.2)
PLATELET # BLD AUTO: 239 X10(3)/MCL (ref 130–400)
PMV BLD AUTO: 9 FL (ref 7.4–10.4)
POTASSIUM SERPL-SCNC: 4.3 MMOL/L (ref 3.5–5.1)
PROT SERPL-MCNC: 8 GM/DL (ref 5.8–7.6)
RBC # BLD AUTO: 3.83 X10(6)/MCL (ref 4.2–5.4)
SODIUM SERPL-SCNC: 140 MMOL/L (ref 136–145)
WBC # SPEC AUTO: 11.1 X10(3)/MCL (ref 4.5–11.5)

## 2021-06-08 ENCOUNTER — HISTORICAL (OUTPATIENT)
Dept: ADMINISTRATIVE | Facility: HOSPITAL | Age: 71
End: 2021-06-08

## 2021-06-22 ENCOUNTER — HISTORICAL (OUTPATIENT)
Dept: HEMATOLOGY/ONCOLOGY | Facility: CLINIC | Age: 71
End: 2021-06-22

## 2021-06-22 LAB
ABS NEUT (OLG): 5.07 X10(3)/MCL (ref 2.1–9.2)
ALBUMIN SERPL-MCNC: 3.3 GM/DL (ref 3.4–4.8)
ALP SERPL-CCNC: 71 UNIT/L (ref 40–150)
ALT SERPL-CCNC: 26 UNIT/L (ref 0–55)
ANION GAP SERPL CALC-SCNC: 19 MMOL/L
AST SERPL-CCNC: 23 UNIT/L (ref 5–34)
BASOPHILS # BLD AUTO: 0 X10(3)/MCL (ref 0–0.2)
BASOPHILS NFR BLD AUTO: 0.2 %
BILIRUB SERPL-MCNC: 0.8 MG/DL
BILIRUBIN DIRECT+TOT PNL SERPL-MCNC: 0.3 MG/DL (ref 0–0.5)
BILIRUBIN DIRECT+TOT PNL SERPL-MCNC: 0.5 MG/DL (ref 0–0.8)
BUN SERPL-MCNC: 24 MG/DL (ref 8–26)
CHLORIDE SERPL-SCNC: 97 MMOL/L (ref 98–109)
CREAT SERPL-MCNC: 1.3 MG/DL (ref 0.6–1.3)
EOSINOPHIL # BLD AUTO: 0.2 X10(3)/MCL (ref 0–0.9)
EOSINOPHIL NFR BLD AUTO: 2.8 %
ERYTHROCYTE [DISTWIDTH] IN BLOOD BY AUTOMATED COUNT: 12.8 % (ref 11.5–17)
GLUCOSE SERPL-MCNC: 162 MG/DL (ref 70–105)
HCT VFR BLD AUTO: 35 % (ref 37–47)
HCT VFR BLD CALC: 37 % (ref 38–51)
HGB BLD-MCNC: 11.8 GM/DL (ref 12–16)
HGB BLD-MCNC: 12.6 MG/DL (ref 12–17)
LIVER PROFILE INTERP: ABNORMAL
LYMPHOCYTES # BLD AUTO: 2.5 X10(3)/MCL (ref 0.6–4.6)
LYMPHOCYTES NFR BLD AUTO: 29.4 %
MCH RBC QN AUTO: 31.9 PG (ref 27–31)
MCHC RBC AUTO-ENTMCNC: 33.7 GM/DL (ref 33–36)
MCV RBC AUTO: 94.6 FL (ref 80–94)
MONOCYTES # BLD AUTO: 0.6 X10(3)/MCL (ref 0.1–1.3)
MONOCYTES NFR BLD AUTO: 7.7 %
NEUTROPHILS # BLD AUTO: 5.1 X10(3)/MCL (ref 2.1–9.2)
NEUTROPHILS NFR BLD AUTO: 59.8 %
PLATELET # BLD AUTO: 219 X10(3)/MCL (ref 130–400)
PMV BLD AUTO: 8.7 FL (ref 9.4–12.4)
POC IONIZED CALCIUM: 1.23 MMOL/L (ref 1.12–1.32)
POC TCO2: 27 MMOL/L (ref 24–29)
POTASSIUM BLD-SCNC: 3.7 MMOL/L (ref 3.5–4.9)
PROT SERPL-MCNC: 7 GM/DL (ref 5.8–7.6)
RBC # BLD AUTO: 3.7 X10(6)/MCL (ref 4.2–5.4)
SODIUM BLD-SCNC: 138 MMOL/L (ref 138–146)
WBC # SPEC AUTO: 8.5 X10(3)/MCL (ref 4.5–11.5)

## 2021-07-16 ENCOUNTER — HISTORICAL (OUTPATIENT)
Dept: LAB | Facility: HOSPITAL | Age: 71
End: 2021-07-16

## 2021-07-16 LAB
ALBUMIN SERPL-MCNC: 3.5 GM/DL (ref 3.4–4.8)
ALBUMIN/GLOB SERPL: 0.9 RATIO (ref 1.1–2)
ALP SERPL-CCNC: 61 UNIT/L (ref 40–150)
ALT SERPL-CCNC: 26 UNIT/L (ref 0–55)
APPEARANCE, UA: ABNORMAL
AST SERPL-CCNC: 19 UNIT/L (ref 5–34)
BACTERIA SPEC CULT: ABNORMAL /HPF
BILIRUB SERPL-MCNC: 0.6 MG/DL (ref 0.2–1.2)
BILIRUB UR QL STRIP: NEGATIVE
BILIRUBIN DIRECT+TOT PNL SERPL-MCNC: 0.2 MG/DL (ref 0–0.5)
BILIRUBIN DIRECT+TOT PNL SERPL-MCNC: 0.4 MG/DL (ref 0–0.8)
BUN SERPL-MCNC: 15.9 MG/DL (ref 9.8–20.1)
CALCIUM SERPL-MCNC: 9.4 MG/DL (ref 8.4–10.2)
CHLORIDE SERPL-SCNC: 101 MMOL/L (ref 98–107)
CO2 SERPL-SCNC: 29 MMOL/L (ref 23–31)
COLOR UR: YELLOW
CREAT SERPL-MCNC: 1.08 MG/DL (ref 0.57–1.11)
CREAT UR-MCNC: 124.8 MG/DL (ref 45–106)
EST. AVERAGE GLUCOSE BLD GHB EST-MCNC: 182.9 MG/DL
GLOBULIN SER-MCNC: 3.7 GM/DL (ref 2.4–3.5)
GLUCOSE (UA): NEGATIVE
GLUCOSE SERPL-MCNC: 133 MG/DL (ref 82–115)
HBA1C MFR BLD: 8 %
HGB UR QL STRIP: ABNORMAL
KETONES UR QL STRIP: NEGATIVE
LEUKOCYTE ESTERASE UR QL STRIP: ABNORMAL
MICROALBUMIN UR-MCNC: 19 UG/ML
MICROALBUMIN/CREAT RATIO PNL UR: 15.2 MG/GM CR (ref 0–30)
MUCOUS THREADS URNS QL MICRO: ABNORMAL /LPF
NITRITE UR QL STRIP: POSITIVE
PH UR STRIP: 6 [PH] (ref 5–7)
POTASSIUM SERPL-SCNC: 3.6 MMOL/L (ref 3.5–5.1)
PROT SERPL-MCNC: 7.2 GM/DL (ref 5.8–7.6)
PROT UR QL STRIP: NEGATIVE
RBC #/AREA URNS HPF: ABNORMAL /HPF
SODIUM SERPL-SCNC: 142 MMOL/L (ref 136–145)
SP GR UR STRIP: 1.02 (ref 1–1.03)
SQUAMOUS EPITHELIAL, UA: ABNORMAL /LPF
TSH SERPL-ACNC: 1.68 UIU/ML (ref 0.35–4.94)
UROBILINOGEN UR STRIP-ACNC: NEGATIVE
WBC #/AREA URNS HPF: ABNORMAL /HPF

## 2021-07-18 LAB — FINAL CULTURE: NORMAL

## 2021-08-19 ENCOUNTER — HISTORICAL (OUTPATIENT)
Dept: RADIOLOGY | Facility: HOSPITAL | Age: 71
End: 2021-08-19

## 2021-08-19 LAB — BMD RECOMMENDATION EXT: NORMAL

## 2021-11-04 ENCOUNTER — HISTORICAL (OUTPATIENT)
Dept: INFUSION THERAPY | Facility: HOSPITAL | Age: 71
End: 2021-11-04

## 2021-12-28 ENCOUNTER — HISTORICAL (OUTPATIENT)
Dept: LAB | Facility: HOSPITAL | Age: 71
End: 2021-12-28

## 2021-12-28 LAB
ABS NEUT (OLG): 4.33 X10(3)/MCL (ref 2.1–9.2)
ALBUMIN SERPL-MCNC: 3.8 GM/DL (ref 3.4–4.8)
ALBUMIN/GLOB SERPL: 1 RATIO (ref 1.1–2)
ALP SERPL-CCNC: 80 UNIT/L (ref 40–150)
ALT SERPL-CCNC: 30 UNIT/L (ref 0–55)
APPEARANCE, UA: ABNORMAL
AST SERPL-CCNC: 24 UNIT/L (ref 5–34)
BACTERIA SPEC CULT: ABNORMAL /HPF
BASOPHILS # BLD AUTO: 0.05 X10(3)/MCL (ref 0–0.2)
BASOPHILS NFR BLD AUTO: 0.6 % (ref 0–1)
BILIRUB SERPL-MCNC: 0.6 MG/DL (ref 0.2–1.2)
BILIRUB UR QL STRIP: NEGATIVE
BILIRUBIN DIRECT+TOT PNL SERPL-MCNC: 0.2 MG/DL (ref 0–0.5)
BILIRUBIN DIRECT+TOT PNL SERPL-MCNC: 0.4 MG/DL (ref 0–0.8)
BUN SERPL-MCNC: 26.5 MG/DL (ref 9.8–20.1)
CALCIUM SERPL-MCNC: 10.6 MG/DL (ref 8.4–10.2)
CHLORIDE SERPL-SCNC: 102 MMOL/L (ref 98–107)
CHOLEST SERPL-MCNC: 266 MG/DL
CHOLEST/HDLC SERPL: 6 {RATIO} (ref 0–5)
CO2 SERPL-SCNC: 28 MMOL/L (ref 23–31)
COLOR UR: YELLOW
CREAT SERPL-MCNC: 1.18 MG/DL (ref 0.57–1.11)
CREAT UR-MCNC: 144.4 MG/DL (ref 45–106)
DEPRECATED CALCIDIOL+CALCIFEROL SERPL-MC: 47.3 NG/ML (ref 30–80)
EOSINOPHIL # BLD AUTO: 0.31 X10(3)/MCL (ref 0–0.9)
EOSINOPHIL NFR BLD AUTO: 3.5 % (ref 0–6.4)
ERYTHROCYTE [DISTWIDTH] IN BLOOD BY AUTOMATED COUNT: 13.3 % (ref 11.5–17)
EST. AVERAGE GLUCOSE BLD GHB EST-MCNC: 168.6 MG/DL
GLOBULIN SER-MCNC: 3.7 GM/DL (ref 2.4–3.5)
GLUCOSE (UA): NEGATIVE
GLUCOSE SERPL-MCNC: 129 MG/DL (ref 82–115)
HBA1C MFR BLD: 7.5 %
HCT VFR BLD AUTO: 37.1 % (ref 37–47)
HDLC SERPL-MCNC: 45 MG/DL (ref 40–60)
HGB BLD-MCNC: 12.1 GM/DL (ref 12–16)
HGB UR QL STRIP: ABNORMAL
IMM GRANULOCYTES # BLD AUTO: 0.03 10*3/UL (ref 0–0.02)
IMM GRANULOCYTES NFR BLD AUTO: 0.3 % (ref 0–0.43)
KETONES UR QL STRIP: NEGATIVE
LDLC SERPL CALC-MCNC: 180 MG/DL (ref 50–140)
LEUKOCYTE ESTERASE UR QL STRIP: ABNORMAL
LYMPHOCYTES # BLD AUTO: 3.29 X10(3)/MCL (ref 0.6–4.6)
LYMPHOCYTES NFR BLD AUTO: 37.4 % (ref 16–44)
MCH RBC QN AUTO: 30.9 PG (ref 27–31)
MCHC RBC AUTO-ENTMCNC: 32.6 GM/DL (ref 33–36)
MCV RBC AUTO: 94.9 FL (ref 80–94)
MICROALBUMIN UR-MCNC: 12.6 UG/ML
MICROALBUMIN/CREAT RATIO PNL UR: 8.7 MG/GM CR (ref 0–30)
MONOCYTES # BLD AUTO: 0.78 X10(3)/MCL (ref 0.1–1.3)
MONOCYTES NFR BLD AUTO: 8.9 % (ref 4–12.1)
NEUTROPHILS # BLD AUTO: 4.33 X10(3)/MCL (ref 2.1–9.2)
NEUTROPHILS NFR BLD AUTO: 49.3 % (ref 43–73)
NITRITE UR QL STRIP: POSITIVE
NRBC BLD AUTO-RTO: 0 % (ref 0–0.2)
PH UR STRIP: 6 [PH] (ref 5–7)
PLATELET # BLD AUTO: 228 X10(3)/MCL (ref 130–400)
PMV BLD AUTO: 8.7 FL (ref 7.4–10.4)
POTASSIUM SERPL-SCNC: 4.1 MMOL/L (ref 3.5–5.1)
PROT SERPL-MCNC: 7.5 GM/DL (ref 5.8–7.6)
PROT UR QL STRIP: NEGATIVE
RBC # BLD AUTO: 3.91 X10(6)/MCL (ref 4.2–5.4)
RBC #/AREA URNS HPF: ABNORMAL /HPF
SODIUM SERPL-SCNC: 142 MMOL/L (ref 136–145)
SP GR UR STRIP: 1.02 (ref 1–1.03)
SQUAMOUS EPITHELIAL, UA: ABNORMAL /LPF
TRIGL SERPL-MCNC: 207 MG/DL (ref 0–150)
TSH SERPL-ACNC: 0.97 UIU/ML (ref 0.35–4.94)
UROBILINOGEN UR STRIP-ACNC: NEGATIVE
VLDLC SERPL CALC-MCNC: 41 MG/DL
WBC # SPEC AUTO: 8.8 X10(3)/MCL (ref 4.5–11.5)
WBC #/AREA URNS HPF: ABNORMAL /HPF

## 2022-01-27 ENCOUNTER — HISTORICAL (OUTPATIENT)
Dept: ADMINISTRATIVE | Facility: HOSPITAL | Age: 72
End: 2022-01-27

## 2022-01-27 ENCOUNTER — HISTORICAL (OUTPATIENT)
Dept: RADIOLOGY | Facility: HOSPITAL | Age: 72
End: 2022-01-27

## 2022-03-14 ENCOUNTER — HISTORICAL (OUTPATIENT)
Dept: LAB | Facility: HOSPITAL | Age: 72
End: 2022-03-14

## 2022-03-14 LAB — URATE SERPL-MCNC: 10.1 MG/DL (ref 2.6–6)

## 2022-04-05 PROBLEM — M81.0 AGE-RELATED OSTEOPOROSIS WITHOUT CURRENT PATHOLOGICAL FRACTURE: Status: ACTIVE | Noted: 2022-04-05

## 2022-04-09 ENCOUNTER — HISTORICAL (OUTPATIENT)
Dept: ADMINISTRATIVE | Facility: HOSPITAL | Age: 72
End: 2022-04-09
Payer: MEDICARE

## 2022-04-11 ENCOUNTER — HISTORICAL (OUTPATIENT)
Dept: ADMINISTRATIVE | Facility: HOSPITAL | Age: 72
End: 2022-04-11

## 2022-04-25 VITALS
WEIGHT: 165.38 LBS | OXYGEN SATURATION: 98 % | HEIGHT: 64 IN | BODY MASS INDEX: 28.24 KG/M2 | SYSTOLIC BLOOD PRESSURE: 112 MMHG | DIASTOLIC BLOOD PRESSURE: 83 MMHG

## 2022-04-29 VITALS
HEIGHT: 62 IN | WEIGHT: 136.44 LBS | SYSTOLIC BLOOD PRESSURE: 124 MMHG | BODY MASS INDEX: 25.11 KG/M2 | DIASTOLIC BLOOD PRESSURE: 70 MMHG | OXYGEN SATURATION: 100 %

## 2022-04-30 NOTE — PROGRESS NOTES
Patient:   Claire Ang            MRN: [id number]            FIN: [id number]               Age:   68 years     Sex:  Female     :  1950   Associated Diagnoses:   Malignant neoplasm of central portion of right female breast; Osteoporosis   Author:   Jasmyne Tsang MD      Visit Information      Surgeon: Dr. Reji Navas  [facility] medical oncologist: Dr. Claire Delaney  Primary care: Dr. Jany Magana    1. Right Breast Cancer stage IB ([id number])--ER+/OH+Her-2neg diagnosed 10/2015  Biopsy/pathology: Excisional biopsy right breast mass UOQ done 2015--invasive poorly differentiated ductal carcinoma, so grade 3, multifocal 18mm and 5mm in greatest dimension respectively, high grade DCIS associated, invasive carcinoma present at margin, ER 97%, OH 64%, Her2 2+ by IHC and non-amplified by FISH, Ki67 68%  Surgery/pathology: Right breast mastectomy with SLN biopsy 2015--biopsy site changes with no residual in-situ or invasive carcinoma, 1/6 lymph nodes positive for metastatic carcinoma measures only 0.5mm with no extranodal extension, Oncotype DX score 24 (16% chance of distant recurrence with Tamoxifen alone).    DEXA:  2017--AP spine -0.6, left femoral neck -1.4, left total hip -0.8, right femoral neck -2.9, right total hip -1.7 c/w osteoporosis, increasein BMD of lumbar spine with no change in hips or femoral necks.    Genetic testing BRCA1/2 negative 3/2016.    2. RUE DVT(post-surgical) 2015--treated with Xarelto(stopped due to hematuria)    Treatment history:  1. Weekly Taxol X 12 and Adriamycin/Cytoxan X 4 cycles completed 2016.  2. Boniva 2016--stopped 2017 (unable to tolerate).  3. Femara started 2016 changed to Arimidex 2016 (arthralgias), changed to Aromasin 2016 (patient never started).  4. Aromasin 3/26/2018--stopped 2018 due to joint problems.      Current treatment:  1. Tamoxifen to start 18  2. Prolia every 6 months started 17--next  "due 1/2019   Visit type:  Scheduled follow-up.    Accompanied by:  No one.       Chief Complaint   9/25/2018 13:54 CDT      bilat hip/groin pain        Interval History   Current complaint: Patient presents for follow-up of breast cancer. She tried taking the Aromasin every other day but caused severe joint pains and "crippled" her so she stopped several months back. She is still having some pain to bilateral groins. She had a CT back in 4/2018 which showed no abnormality in A/P. She reports having some fatigue. Labs show a mild anemia and she reports that she had a colonoscopy with Dr. Henriquez this year. She has a history of DVT which occurred after her mastectomy. We discussed trial of Tamoxifen. She is aware that it can increase risk of DVT but she would like to try anyway.      Review of Systems   Constitutional:  Fatigue, No fever, No chills, No weakness.    Eye:  No recent visual problem.    Ear/Nose/Mouth/Throat:  No decreased hearing, No nasal congestion, No sore throat.    Respiratory:  No shortness of breath, No cough, No wheezing.    Cardiovascular:  No chest pain, No palpitations, No peripheral edema.    Gastrointestinal:  No nausea, No vomiting, No diarrhea, No constipation, No abdominal pain.    Hematology/Lymphatics:  No bruising tendency, No bleeding tendency.    Musculoskeletal:  bilateral groin pain, No back pain, No joint pain.    Integumentary:  No rash, No skin lesion.    Neurologic:  No confusion, No headache.    Psychiatric:  No anxiety, No depression.    All other systems are negative      Health Status   Allergies:    Allergies (4) Active Reaction  Bactrim stomach pain  Demerol HCl Visual hallucinations  Lortab N+V - Nausea and vomiting  morphine chest tightness     Current medications:  (Selected)   Outpatient Medications  Future  Prolia 60 mg/mL subcutaneous solution: 60 mg, form: Soln, Subcutaneous, Once-NOW, first dose 01/17/19 14:00:00 CST, stop date 01/17/19 14:00:00 CST, Routine, " Days 185, Future Order  Prescriptions  Prescribed  DMII meter and testing supplies: DMII meter and testing supplies, See Instructions, Please dispense one DMII meter and testing supplies to test BS once daily. Dispense 3 month supply. Dx E11.9, # 1 units, 0 Refill(s), Pharmacy: [facility]  Synthroid 88 mcg (0.088 mg) oral tablet: See Instructions, TAKE 1 TABLET BY MOUTH DAILY, # 30 tab(s), 5 Refill(s), eRx: [facility]  buPROPion 150 mg/24 hours (XL) oral tablet, extended release: 150 mg = 1 tab(s), Oral, BID, do not substitute generic, # 180 tab(s), 0 Refill(s), MAYANK, Pharmacy: [facility]  busPIRone 5 mg oral tablet: 5 mg = 1 tab(s), Oral, TID, PRN PRN anxiety, # 15 tab(s), 0 Refill(s), Pharmacy: [facility]  meclizine 25 mg oral tablet: 25 mg = 1 tab(s), Oral, BID, PRN PRN DIZZINESS, X 30 day(s), # 60 tab(s), 0 Refill(s), Pharmacy: [facility]  metformin 500 mg oral tablet: See Instructions, TAKE 1 TABLET BY MOUTH TWICE DAILY, # 180 tab(s), 1 Refill(s), Pharmacy: [facility]  tamoxifen 20 mg oral tablet: 20 mg = 1 tab(s), Oral, Daily, # 30 tab(s), 6 Refill(s), Pharmacy: [facility]  Documented Medications  Documented  Aspir 81 oral delayed release tablet: 81 mg = 1 tab(s), Oral, Daily, 0 Refill(s)  EXFORGE      TAB 5-160M tab(s), Oral, Daily      Histories   Past Medical History:    Resolved  Nephrolithiasis (592.0):  Resolved.  Recurrent UTI (599.0):  Resolved.  thyroid (276089988):  Resolved.   Family History:    Cancer  Mother ()  Stroke  Mother ()  CAD - Coronary artery disease  Mother ()  Hypertension.  Mother ()     Procedure history:    Colonoscopy ([id number]) on 2018 at 68 Years.  Colonoscopy on 2015 at 64 Years.  Comments:  2015 10:32 - Leda FIELDS, Hanane MORELOS  auto-populated from documented surgical case  Total abdominal hysterectomy (corpus and cervix), with or  without removal of tube(s), with or without removal of ovary(s); (96815).  Cholecystectomy ([id number]).  neck sx.  Mastectomy of right breast ([id number]).  Comments:  5/15/2018 13:19 - Brice FIELDS, Dejah RODRIGUEZ  2015  Chemotherapy ([id number]).  Comments:  3/22/2016 09:49 - Lucien HERNÁNDEZ Keren  16 tx  right kidney stent for stones.   Social History          Social & Psychosocial Habits    Alcohol  06/15/2012 Risk Assessment: Denies Alcohol Use    03/22/2016  Use: Never    Employment/School  03/22/2016  Status: Unemployed    Exercise    Comment: does not exercise - 03/22/2016 09:51 - Lucien HERNÁNDEZ Keren    Home/Environment  03/22/2016  Lives with: Spouse    Living situation: Home/Independent    Nutrition/Health  03/22/2016  Type of diet: Regular    Sexual  03/22/2016  Sexually active: Yes    Substance Abuse  01/13/2015 Risk Assessment: Denies Substance Abuse    03/22/2016  Use: Never    Tobacco  06/15/2012 Risk Assessment: Denies Tobacco Use    03/22/2016  Use: Never smoker  .        Physical Examination      Vital Signs (last 24 hrs)_____  Last Charted___________  Temp Oral     36.7 DegC  (SEP 25 13:54)  Heart Rate Peripheral   85 bpm  (SEP 25 13:54)  SBP      129 mmHg  (SEP 25 13:54)  DBP      84 mmHg  (SEP 25 13:54)  SpO2      97 %  (SEP 25 13:54)  Weight      73.8 kg  (SEP 25 13:54)  Height      163 cm  (SEP 25 13:54)  BMI      27.78  (SEP 25 13:54)     General:  Alert and oriented, No acute distress, pleasant white female.    Eye:  Normal conjunctiva, Vision unchanged.    HENT:  Normocephalic, Normal hearing, Oral mucosa is moist.    Neck:  Supple, No jugular venous distention, No lymphadenopathy, No thyromegaly.    Respiratory:  Lungs are clear to auscultation, Breath sounds are equal.    Cardiovascular:  Normal rate, Regular rhythm, No murmur, No gallop, Normal peripheral perfusion, No edema.    Gastrointestinal:  Soft, Non-tender, Non-distended, Normal bowel sounds, No organomegaly.    Lymphatics:  No lymphadenopathy neck,  axilla, groin.    Musculoskeletal:  Normal range of motion, Normal strength, No deformity, Normal gait.    Integumentary:  Warm, Intact.    Neurologic:  Alert, Oriented, Normal sensory, Normal motor function.    Psychiatric:  Cooperative, Appropriate mood & affect.    Breast:  Right chest wall s/p mastectomy, healed well, no skin nodules, left breast normal, no palpable axillary adenopathy bilaterally.       Review / Management   Results review:  Lab results   9/25/2018 13:37 CDT      WBC                       7.5 x10(3)/mcL                             RBC                       3.54 x10(6)/mcL  LOW                             Hgb                       11.4 gm/dL  LOW                             Hct                       35.4 %  LOW                             Platelet                  188 x10(3)/mcL                             MCV                       100.0 fL  HI                             MCH                       32.2 pg  HI                             MCHC                      32.2 gm/dL  LOW                             RDW                       13.0 %                             MPV                       8.4 fL  LOW                             Abs Neut                  4.12 x10(3)/mcL                             NEUT%                     54.9 %  NA                             NEUT#                     4.1 x10(3)/mcL                             LYMPH%                    32.8 %  NA                             LYMPH#                    2.5 x10(3)/mcL                             MONO%                     8.9 %  NA                             MONO#                     0.7 x10(3)/mcL                             EOS%                      3.1 %  NA                             EOS#                      0.2 x10(3)/mcL                             BASO%                     0.3 %  NA                             BASO#                     0.0 x10(3)/mcL  .       Impression and Plan   Diagnosis     Malignant neoplasm of  "central portion of right female breast ([id number]-LN C50.111).     Osteoporosis (VBN04-GH M81.0).     Plan:  Patient with stage IB ([id number]) right breast cancer s/p right mastectomy in 10/2015, intermediate risk oncotype, completed adjuvant chemotherapy with Taxol weekly X 12 and AC every 3 weeks X 4 cycles on 4/26/2016.   Patient was started in Femara 5/2016, changed to Arimidex 8/2016 but patient stopped all endocrine therapy in 11/2016.   Aromasin prescribed in 3/2018 and she took only for a few months then stopped due to "crippling" arthritis.  Discussed Tamoxifen and she wants to try. She is aware this can increase risk of DVT. She is s/p hysterectomy so no need to worry about uterine cancer risk.    Currently patient has not signs or symptoms to suggest recurrence of disease.  Labs show mild anemia. Will check iron studies and B12. Instructed patient to start a multivitamin with iron daily.  MMG at [facility] left breast 1/16/2018 was benign. Due for repeat here in 1/2019--ordered today.    Start Tamoxifen daily. Most common side effects explained.    RTC 3 months for follow-up toxicity check.    Prolia every 6 months continues for osteoporosis, next dose due 1/2019.  Continue Calcium and vitamin D.      DEXA 5/30/2017 showed Osteoporosis. Next DEXA due in 5/2019.    All questions answered at this time.        Jasmyne Tsang MD   "

## 2022-04-30 NOTE — OP NOTE
Patient:   Claire Ang            MRN: [id number]            FIN: [id number]               Age:   71 years     Sex:  Female     :  1950   Associated Diagnoses:   None   Author:   Rachana PALOMO MD, Ricardo R      Pre-op Dx:  Cataract of the Right eye    Post-op Dx:  Cataract of the Right eye     Procedure:  Cataract extraction by phacoemulsification   with an IOL    Anes:   Topical    Complications: None    Procedure in detail:   Dilating drops were given in the holding area.  The patient was brought into the surgical suite, identified and the correct eye confirmed.  Topical anesthesia was applied.  The eye was then prepped and draped in a sterile fashion.  A supersharp blade was used to make a paracentesis at the 11 oclock position.  Viscoelastic was placed in the anterior chamber.  A clear corneal incision was made at the 215 degree position with a keratome blade.  Next, a cystotome and utrata forceps were used to make a 360 degree capsulorrhexis.  The phaco handpiece was placed into the anterior chamber and the lens removed in a divide and conquer fashion.  The remaining cortex was removed with the I & A handpiece.  Viscoelastic was placed into the capsular bag, which remained clear and intact.  An  IOL was placed in the bag and rotated into position.  The remaining viscoelastic was removed with the I &A handpiece.  The incision was hydrated with BSS and checked for leakage.  No leakage was found.  The drapes were removed and antibiotic drops were placed into the eye.  The patient tolerated the procedure well and was moved back to the holding room.  Sunglasses and instructions were personally given to the patient and family.  The patient will follow-up at my office tomorrow.     EFX 19    24.5    ZCBOO iol        Marlon Reich II, M.D.

## 2022-04-30 NOTE — PROGRESS NOTES
Patient:   Claire Ang            MRN: [id number]            FIN: [id number]               Age:   69 years     Sex:  Female     :  1950   Associated Diagnoses:   Malignant neoplasm of central portion of right female breast; Osteoporosis   Author:   Jasmyne Tsang MD      Visit Information      Surgeon: Dr. Reji Navas  [facility] medical oncologist: Dr. Claire Delaney  Primary care: Dr. Jany Magana    1. Right Breast Cancer stage IB ([id number])--ER+/SC+Her-2neg diagnosed 10/2015  Biopsy/pathology: Excisional biopsy right breast mass UOQ done 2015--invasive poorly differentiated ductal carcinoma, so grade 3, multifocal 18mm and 5mm in greatest dimension respectively, high grade DCIS associated, invasive carcinoma present at margin, ER 97%, SC 64%, Her2 2+ by IHC and non-amplified by FISH, Ki67 68%  Surgery/pathology: Right breast mastectomy with SLN biopsy 2015--biopsy site changes with no residual in-situ or invasive carcinoma, 1/6 lymph nodes positive for metastatic carcinoma measures only 0.5mm with no extranodal extension, Oncotype DX score 24 (16% chance of distant recurrence with Tamoxifen alone).    DEXA:  2017--AP spine -0.6, left femoral neck -1.4, left total hip -0.8, right femoral neck -2.9, right total hip -1.7 c/w osteoporosis, increasein BMD of lumbar spine with no change in hips or femoral necks.    Genetic testing BRCA1/2 negative 3/2016.    2. RUE DVT(post-surgical) 2015--treated with Xarelto(stopped due to hematuria)    Treatment history:  1. Weekly Taxol X 12 and Adriamycin/Cytoxan X 4 cycles completed 2016.  2. Boniva 2016--stopped 2017 (unable to tolerate).  3. Femara started 2016 changed to Arimidex 2016 (arthralgias), changed to Aromasin 2016 (patient never started).  4. Aromasin 3/26/2018--stopped 2018 due to joint problems.      Current treatment:  1. Tamoxifen started 20  2. Prolia every 6 months started 17--next  due 8/2019   Visit type:  Scheduled follow-up.    Accompanied by:  No one.       Chief Complaint   4/3/2019 12:52 CDT       No c/o        Interval History   Current complaint: Patient presents for follow-up of breast cancer. She is doing well but reports that she started the Tamoxifen and caused pain behind her kness and some LE edema. She stopped and tried taking it every other day but she got scared of the risk of stroke and so she stopped 3 weeks ago. Still having problems with arthritis, in hands and right hip. Had Xray of right hip which just showed degenerative change. She otherwise has no other complaints.      Review of Systems   Constitutional:  No fever, No chills, No weakness, No fatigue.    Eye:  No recent visual problem.    Ear/Nose/Mouth/Throat:  No decreased hearing, No nasal congestion, No sore throat.    Respiratory:  No shortness of breath, No cough, No wheezing.    Cardiovascular:  No chest pain, No palpitations, No peripheral edema.    Gastrointestinal:  No nausea, No vomiting, No diarrhea, No constipation, No abdominal pain.    Hematology/Lymphatics:  No bruising tendency, No bleeding tendency.    Musculoskeletal:  Joint pain, arthritis right hip and hands, No back pain.    Integumentary:  No rash, No skin lesion.    Neurologic:  No confusion, No headache.    Psychiatric:  No anxiety, No depression.    All other systems are negative      Health Status   Allergies:    Allergies (4) Active Reaction  Bactrim stomach pain  Demerol HCl Visual hallucinations  Lortab N+V - Nausea and vomiting  morphine chest tightness   Current medications:  (Selected)   Prescriptions  Prescribed  Bactroban 2% topical cream: See Instructions, 1 appTOP TID  apply a thin film, # 30 gm, 1 Refill(s), Pharmacy: [facility] [id number]  Synthroid 75 mcg (0.075 mg) oral tablet: 75 mcg = 1 tab(s), Oral, Daily, BRAND NAME NECESSARY, # 30 tab(s), 3 Refill(s), Pharmacy: [email or web address] [id number]  buPROPion 150 mg/12 hours  (SR) oral tablet, extended release: 150 mg = 1 tab(s), Oral, BID, # 180 tab(s), 3 Refill(s), Pharmacy: [facility]  busPIRone 5 mg oral tablet: 5 mg = 1 tab(s), Oral, TID, PRN PRN anxiety, # 15 tab(s), 1 Refill(s), Pharmacy: [facility]  esomeprazole 40 mg oral delayed release capsule ([facility] Substitution): See Instructions, TAKE 1 TABLET DAILY, # 30 tab(s), 11 Refill(s), eRx: [facility]  glucometer with testing supplies: glucometer with testing supplies, See Instructions, please dispense one glucometer and DMII testing supplies to test BS once daily. dispense 3m supply. Dx E11.9, # 1 EA, 0 Refill(s), Pharmacy: [facility]  metformin 500 mg oral tablet: 500 mg = 1 tab(s), Oral, Daily, # 30 tab(s), 5 Refill(s), Pharmacy: [facility]  tamoxifen 20 mg oral tablet: 20 mg = 1 tab(s), Oral, Daily, # 30 tab(s), 3 Refill(s), Pharmacy: [facility]  Documented Medications  Documented  Aspir 81 oral delayed release tablet: 81 mg = 1 tab(s), Oral, Daily, 0 Refill(s)  EXFORGE      TAB 5-160M tab(s), Oral, Daily  Macrobid 100 mg oral capsule: 100 mg = 1 cap(s), Oral, BID, # 20 cap(s), 0 Refill(s)  ibuprofen 800 mg oral tablet: 800 mg = 1 tab(s), Oral, TID, PRN PRN for pain, # 30 tab(s), 0 Refill(s)   Problem list:    Active Problems (14)  Anxiety   Depression   Diabetes mellitus   Elevated MCV   Fibromyalgia   Genital herpes   H/O: chemotherapy   Hyperlipidemia   Hypertension   Hypothyroidism   Nephrolithiasis   Recurrent UTI   Review of care plan   UTI - Urinary tract infection       Histories   Past Medical History:    Resolved  Breast cancer (079747924): Onset on 2015 at 65 years.  Resolved.  Comments:  3/22/2016 CDT 9:47 CDT - Keren Mcgraw LPN, md  Nephrolithiasis (592.0):  Resolved.  Recurrent UTI (599.0):  Resolved.  thyroid (222973361):  Resolved.  Osteoporosis (995128926):  Resolved.   Family  History:    Cancer  Mother ()  Stroke  Mother ()  CAD - Coronary artery disease  Mother ()  Hypertension.  Mother ()     Procedure history:    Eye examination (01635312) on 2019 at 69 Years.  Eye examination (85937622) on 2018 at 68 Years.  Colonoscopy (671883951) on 2018 at 68 Years.  Colonoscopy on 2015 at 64 Years.  Comments:  2015 10:32 - Leda FIELDS, Haanne MORELOS  auto-populated from documented surgical case  Total abdominal hysterectomy (corpus and cervix), with or without removal of tube(s), with or without removal of ovary(s); (77132).  Cholecystectomy (86308328).  neck sx.  Mastectomy of right breast (0178254296).  Comments:  5/15/2018 13:19 - Brice FIELDS, Dejah RODRIGUEZ  2015  Chemotherapy ([id number]).  Comments:  3/22/2016 09:49 - Keren Mcgraw LPN  16 tx  right kidney stent for stones.   Social History          Social & Psychosocial Habits    Alcohol  06/15/2012 Risk Assessment: Denies Alcohol Use    2016  Use: Never    Employment/School  2016  Status: Unemployed    Exercise    Comment: does not exercise - 2016 09:51 - Keren Mcgraw LPN    Home/Environment  2016  Lives with: Spouse    Living situation: Home/Independent    Nutrition/Health  2016  Type of diet: Regular    Sexual  2016  Sexually active: Yes    Substance Abuse  2015 Risk Assessment: Denies Substance Abuse    2016  Use: Never    Tobacco  06/15/2012 Risk Assessment: Denies Tobacco Use    2016  Use: Never smoker    2018  Use: Never smoker    Patient Wants Consult For Cessation Counseling No    2019  Use: Never (less than 100 in l    Patient Wants Consult For Cessation Counseling No    2019  Use: Never (less than 100 in l    Patient Wants Consult For Cessation Counseling No    2019  Use: Never (less than 100 in l    Patient Wants Consult For Cessation Counseling No.        Physical Examination      Vital Signs (last 24  hrs)_____  Last Charted___________  Temp Oral     36.7 DegC  (APR 03 12:52)  Heart Rate Peripheral   82 bpm  (APR 03 12:52)  SBP      132 mmHg  (APR 03 12:52)  DBP      69 mmHg  (APR 03 12:52)  SpO2      97 %  (APR 03 12:52)  Weight      79.1 kg  (APR 03 12:52)  Height      163 cm  (APR 03 12:52)  BMI      29.77  (APR 03 12:52)   General:  Alert and oriented, No acute distress, pleasant white female.    Eye:  Normal conjunctiva, Vision unchanged.    HENT:  Normocephalic, Normal hearing, Oral mucosa is moist.    Neck:  Supple, No jugular venous distention, No lymphadenopathy, No thyromegaly.    Respiratory:  Lungs are clear to auscultation, Breath sounds are equal.    Cardiovascular:  Normal rate, Regular rhythm, No murmur, No gallop, Normal peripheral perfusion, No edema.    Gastrointestinal:  Soft, Non-tender, Non-distended, Normal bowel sounds, No organomegaly.    Lymphatics:  No lymphadenopathy neck, axilla, groin.    Musculoskeletal:  Normal range of motion, Normal strength, No deformity, Normal gait.    Integumentary:  Warm, Intact.    Neurologic:  Alert, Oriented, Normal sensory, Normal motor function.    Psychiatric:  Cooperative, Appropriate mood & affect.    Breast:  Right chest wall s/p mastectomy, healed well, no skin nodules, left breast normal, no palpable axillary adenopathy bilaterally.       Review / Management   Results review:  Lab results   4/3/2019 13:03 CDT       WBC                       7.9 x10(3)/mcL                             RBC                       3.99 x10(6)/mcL  LOW                             Hgb                       12.4 gm/dL                             Hct                       38.9 %                             Platelet                  230 x10(3)/mcL                             MCV                       97.5 fL  HI                             MCH                       31.1 pg  HI                             MCHC                      31.9 gm/dL  LOW                              "RDW                       13.3 %                             MPV                       8.6 fL  LOW                             Abs Neut                  4.07 x10(3)/mcL                             NEUT%                     51.6 %  NA                             NEUT#                     4.1 x10(3)/mcL                             LYMPH%                    39.7 %  NA                             LYMPH#                    3.1 x10(3)/mcL                             MONO%                     6.6 %  NA                             MONO#                     0.5 x10(3)/mcL                             EOS%                      1.6 %  NA                             EOS#                      0.1 x10(3)/mcL                             BASO%                     0.4 %  NA                             BASO#                     0.0 x10(3)/mcL  .       Impression and Plan   Diagnosis     Malignant neoplasm of central portion of right female breast ([id number] C50.111).     Osteoporosis (CWR12-FP M81.0).     Plan:  Patient with stage IB ([id number]) right breast cancer s/p right mastectomy in 10/2015, intermediate risk oncotype, completed adjuvant chemotherapy with Taxol weekly X 12 and AC every 3 weeks X 4 cycles on 4/26/2016.   Patient was started in Femara 5/2016, changed to Arimidex 8/2016 but patient stopped all endocrine therapy in 11/2016.   Aromasin prescribed in 3/2018 and she took only for a few months then stopped due to "crippling" arthritis.  Discussed Tamoxifen and she did try it but now has stopped.  She is aware this can increase risk of DVT. She is s/p hysterectomy so no need to worry about uterine cancer risk.    Currently patient has not signs or symptoms to suggest recurrence of disease.  CBCdiff from today is good. Will follow-up CMP.  MMG left breast 2/719 benign.  Discussed again importance of endocrine therapy in her type of breast cancer and how it is important for her to be on treatment.  She will get back on " Tamoxifen. Continue baby ASA daily.    RTC 6 months for follow-up with repeat labs.    Prolia every 6 months continues for osteoporosis, next dose due 8/2019.  Continue Calcium and vitamin D.      DEXA 5/30/2017 showed Osteoporosis. Next DEXA due in 5/2019--ordered today.    All questions answered at this time.        Jasmyne Tsang MD

## 2022-05-17 ENCOUNTER — PATIENT OUTREACH (OUTPATIENT)
Dept: ADMINISTRATIVE | Facility: HOSPITAL | Age: 72
End: 2022-05-17
Payer: MEDICARE

## 2022-05-17 NOTE — PROGRESS NOTES
Population Health Outreach.Records Received, hyper-linked into chart at this time. The following record(s)  below were uploaded for Health Maintenance .    01.16.2015-COLONOSCOPY  08.19.2021-DEXA SCAN

## 2022-05-18 ENCOUNTER — OFFICE VISIT (OUTPATIENT)
Dept: FAMILY MEDICINE | Facility: CLINIC | Age: 72
End: 2022-05-18
Payer: MEDICARE

## 2022-05-18 ENCOUNTER — HOSPITAL ENCOUNTER (EMERGENCY)
Facility: HOSPITAL | Age: 72
Discharge: HOME | End: 2022-05-18
Attending: STUDENT IN AN ORGANIZED HEALTH CARE EDUCATION/TRAINING PROGRAM
Payer: MEDICARE

## 2022-05-18 VITALS
DIASTOLIC BLOOD PRESSURE: 78 MMHG | SYSTOLIC BLOOD PRESSURE: 116 MMHG | BODY MASS INDEX: 28 KG/M2 | WEIGHT: 164 LBS | HEIGHT: 64 IN | HEART RATE: 80 BPM | RESPIRATION RATE: 20 BRPM | TEMPERATURE: 97 F | OXYGEN SATURATION: 98 %

## 2022-05-18 VITALS
BODY MASS INDEX: 27.32 KG/M2 | DIASTOLIC BLOOD PRESSURE: 63 MMHG | RESPIRATION RATE: 16 BRPM | OXYGEN SATURATION: 97 % | WEIGHT: 164 LBS | HEIGHT: 65 IN | HEART RATE: 91 BPM | SYSTOLIC BLOOD PRESSURE: 101 MMHG

## 2022-05-18 DIAGNOSIS — R10.33 PERIUMBILICAL ABDOMINAL PAIN: ICD-10-CM

## 2022-05-18 DIAGNOSIS — R10.9 ABDOMINAL PAIN, UNSPECIFIED ABDOMINAL LOCATION: Primary | ICD-10-CM

## 2022-05-18 DIAGNOSIS — R53.1 GENERALIZED WEAKNESS: ICD-10-CM

## 2022-05-18 DIAGNOSIS — N30.00 ACUTE CYSTITIS WITHOUT HEMATURIA: Primary | ICD-10-CM

## 2022-05-18 LAB
ALBUMIN SERPL-MCNC: 3.5 GM/DL (ref 3.4–4.8)
ALBUMIN/GLOB SERPL: 1 RATIO (ref 1.1–2)
ALP SERPL-CCNC: 70 UNIT/L (ref 40–150)
ALT SERPL-CCNC: 16 UNIT/L (ref 0–55)
APPEARANCE UR: ABNORMAL
AST SERPL-CCNC: 17 UNIT/L (ref 5–34)
BACTERIA #/AREA URNS AUTO: ABNORMAL /HPF
BASOPHILS # BLD AUTO: 0.03 X10(3)/MCL (ref 0–0.2)
BASOPHILS NFR BLD AUTO: 0.3 %
BILIRUB UR QL STRIP.AUTO: NEGATIVE MG/DL
BILIRUBIN DIRECT+TOT PNL SERPL-MCNC: 0.4 MG/DL
BUN SERPL-MCNC: 23.9 MG/DL (ref 9.8–20.1)
CALCIUM SERPL-MCNC: 9.8 MG/DL (ref 8.4–10.2)
CHLORIDE SERPL-SCNC: 101 MMOL/L (ref 98–107)
CO2 SERPL-SCNC: 28 MMOL/L (ref 23–31)
COLOR UR AUTO: YELLOW
CREAT SERPL-MCNC: 1.39 MG/DL (ref 0.55–1.02)
EOSINOPHIL # BLD AUTO: 0.14 X10(3)/MCL (ref 0–0.9)
EOSINOPHIL NFR BLD AUTO: 1.5 %
ERYTHROCYTE [DISTWIDTH] IN BLOOD BY AUTOMATED COUNT: 13.2 % (ref 11.5–17)
GLOBULIN SER-MCNC: 3.5 GM/DL (ref 2.4–3.5)
GLUCOSE SERPL-MCNC: 201 MG/DL (ref 82–115)
GLUCOSE UR QL STRIP.AUTO: NEGATIVE MG/DL
HCT VFR BLD AUTO: 35.6 % (ref 37–47)
HGB BLD-MCNC: 11.8 GM/DL (ref 12–16)
IMM GRANULOCYTES # BLD AUTO: 0.02 X10(3)/MCL (ref 0–0.02)
IMM GRANULOCYTES NFR BLD AUTO: 0.2 % (ref 0–0.43)
KETONES UR QL STRIP.AUTO: NEGATIVE MG/DL
LEUKOCYTE ESTERASE UR QL STRIP.AUTO: ABNORMAL UNIT/L
LIPASE SERPL-CCNC: 24 U/L
LYMPHOCYTES # BLD AUTO: 3.41 X10(3)/MCL (ref 0.6–4.6)
LYMPHOCYTES NFR BLD AUTO: 35.9 %
MCH RBC QN AUTO: 31.9 PG (ref 27–31)
MCHC RBC AUTO-ENTMCNC: 33.1 MG/DL (ref 33–36)
MCV RBC AUTO: 96.2 FL (ref 80–94)
MONOCYTES # BLD AUTO: 0.67 X10(3)/MCL (ref 0.1–1.3)
MONOCYTES NFR BLD AUTO: 7.1 %
NEUTROPHILS # BLD AUTO: 5.2 X10(3)/MCL (ref 2.1–9.2)
NEUTROPHILS NFR BLD AUTO: 55 %
NITRITE UR QL STRIP.AUTO: NEGATIVE
NRBC BLD AUTO-RTO: 0 %
PH UR STRIP.AUTO: 6 [PH]
PLATELET # BLD AUTO: 220 X10(3)/MCL (ref 130–400)
PMV BLD AUTO: 8.9 FL (ref 9.4–12.4)
POTASSIUM SERPL-SCNC: 3.5 MMOL/L (ref 3.5–5.1)
PROT SERPL-MCNC: 7 GM/DL (ref 5.8–7.6)
PROT UR QL STRIP.AUTO: NEGATIVE MG/DL
RBC # BLD AUTO: 3.7 X10(6)/MCL (ref 4.2–5.4)
RBC #/AREA URNS AUTO: ABNORMAL /HPF
RBC UR QL AUTO: ABNORMAL UNIT/L
SODIUM SERPL-SCNC: 139 MMOL/L (ref 136–145)
SP GR UR STRIP.AUTO: 1.02
SQUAMOUS #/AREA URNS AUTO: ABNORMAL /LPF
UROBILINOGEN UR STRIP-ACNC: 0.2 MG/DL
WBC # SPEC AUTO: 9.5 X10(3)/MCL (ref 4.5–11.5)
WBC #/AREA URNS AUTO: ABNORMAL /HPF

## 2022-05-18 PROCEDURE — 63600175 PHARM REV CODE 636 W HCPCS: Performed by: NURSE PRACTITIONER

## 2022-05-18 PROCEDURE — 99285 EMERGENCY DEPT VISIT HI MDM: CPT | Mod: 25

## 2022-05-18 PROCEDURE — 83690 ASSAY OF LIPASE: CPT | Performed by: NURSE PRACTITIONER

## 2022-05-18 PROCEDURE — 96361 HYDRATE IV INFUSION ADD-ON: CPT

## 2022-05-18 PROCEDURE — 99213 OFFICE O/P EST LOW 20 MIN: CPT | Mod: ,,, | Performed by: NURSE PRACTITIONER

## 2022-05-18 PROCEDURE — 99213 PR OFFICE/OUTPT VISIT, EST, LEVL III, 20-29 MIN: ICD-10-PCS | Mod: ,,, | Performed by: NURSE PRACTITIONER

## 2022-05-18 PROCEDURE — 80053 COMPREHEN METABOLIC PANEL: CPT | Performed by: NURSE PRACTITIONER

## 2022-05-18 PROCEDURE — 36415 COLL VENOUS BLD VENIPUNCTURE: CPT | Performed by: NURSE PRACTITIONER

## 2022-05-18 PROCEDURE — 96374 THER/PROPH/DIAG INJ IV PUSH: CPT

## 2022-05-18 PROCEDURE — 25000003 PHARM REV CODE 250: Performed by: NURSE PRACTITIONER

## 2022-05-18 PROCEDURE — 96372 THER/PROPH/DIAG INJ SC/IM: CPT | Mod: 59 | Performed by: NURSE PRACTITIONER

## 2022-05-18 PROCEDURE — 96375 TX/PRO/DX INJ NEW DRUG ADDON: CPT

## 2022-05-18 PROCEDURE — 85025 COMPLETE CBC W/AUTO DIFF WBC: CPT | Performed by: NURSE PRACTITIONER

## 2022-05-18 PROCEDURE — 81001 URINALYSIS AUTO W/SCOPE: CPT | Performed by: NURSE PRACTITIONER

## 2022-05-18 RX ORDER — TRAMADOL HYDROCHLORIDE 50 MG/1
50 TABLET ORAL EVERY 8 HOURS PRN
Qty: 15 TABLET | Refills: 0 | Status: SHIPPED | OUTPATIENT
Start: 2022-05-18 | End: 2022-05-23

## 2022-05-18 RX ORDER — LEVOTHYROXINE SODIUM 75 UG/1
75 TABLET ORAL DAILY
COMMUNITY
Start: 2022-03-14 | End: 2022-10-26 | Stop reason: SDUPTHER

## 2022-05-18 RX ORDER — GLIMEPIRIDE 2 MG/1
2 TABLET ORAL DAILY
COMMUNITY
Start: 2022-04-29 | End: 2022-07-13 | Stop reason: SDUPTHER

## 2022-05-18 RX ORDER — VALSARTAN AND HYDROCHLOROTHIAZIDE 320; 25 MG/1; MG/1
1 TABLET, FILM COATED ORAL DAILY
COMMUNITY
Start: 2022-05-03 | End: 2023-08-08

## 2022-05-18 RX ORDER — DICYCLOMINE HYDROCHLORIDE 20 MG/1
20 TABLET ORAL 3 TIMES DAILY
Qty: 21 TABLET | Refills: 0 | Status: SHIPPED | OUTPATIENT
Start: 2022-05-18 | End: 2022-05-25

## 2022-05-18 RX ORDER — CETIRIZINE HYDROCHLORIDE 5 MG/1
5 TABLET ORAL DAILY PRN
COMMUNITY
Start: 2021-11-24

## 2022-05-18 RX ORDER — PANTOPRAZOLE SODIUM 40 MG/1
40 TABLET, DELAYED RELEASE ORAL DAILY
COMMUNITY
Start: 2022-03-29 | End: 2022-09-12

## 2022-05-18 RX ORDER — DICYCLOMINE HYDROCHLORIDE 10 MG/ML
20 INJECTION INTRAMUSCULAR
Status: COMPLETED | OUTPATIENT
Start: 2022-05-18 | End: 2022-05-18

## 2022-05-18 RX ORDER — CIPROFLOXACIN 500 MG/1
500 TABLET ORAL
Status: COMPLETED | OUTPATIENT
Start: 2022-05-18 | End: 2022-05-18

## 2022-05-18 RX ORDER — FLASH GLUCOSE SENSOR
KIT MISCELLANEOUS
COMMUNITY
Start: 2022-04-15 | End: 2022-05-26 | Stop reason: SDUPTHER

## 2022-05-18 RX ORDER — MAGNESIUM 200 MG
TABLET ORAL DAILY
COMMUNITY

## 2022-05-18 RX ORDER — PREDNISONE 20 MG/1
20 TABLET ORAL DAILY
COMMUNITY
Start: 2022-03-14 | End: 2022-09-12

## 2022-05-18 RX ORDER — FAMOTIDINE 20 MG/1
20 TABLET, FILM COATED ORAL NIGHTLY PRN
COMMUNITY
Start: 2022-03-29 | End: 2023-02-07

## 2022-05-18 RX ORDER — BUPROPION HYDROCHLORIDE 200 MG/1
200 TABLET, FILM COATED ORAL DAILY
COMMUNITY
Start: 2022-02-07 | End: 2023-03-24

## 2022-05-18 RX ORDER — FLUTICASONE PROPIONATE 50 MCG
SPRAY, SUSPENSION (ML) NASAL
COMMUNITY
Start: 2021-11-24 | End: 2022-09-12

## 2022-05-18 RX ORDER — ONDANSETRON 4 MG/1
4 TABLET, ORALLY DISINTEGRATING ORAL EVERY 8 HOURS PRN
Qty: 21 TABLET | Refills: 0 | Status: SHIPPED | OUTPATIENT
Start: 2022-05-18 | End: 2022-05-25

## 2022-05-18 RX ORDER — CIPROFLOXACIN 500 MG/1
500 TABLET ORAL 2 TIMES DAILY
Qty: 14 TABLET | Refills: 0 | Status: SHIPPED | OUTPATIENT
Start: 2022-05-18 | End: 2022-05-23

## 2022-05-18 RX ORDER — BUSPIRONE HYDROCHLORIDE 5 MG/1
5 TABLET ORAL 2 TIMES DAILY
COMMUNITY
Start: 2022-04-01 | End: 2022-09-12

## 2022-05-18 RX ORDER — KETOROLAC TROMETHAMINE 30 MG/ML
15 INJECTION, SOLUTION INTRAMUSCULAR; INTRAVENOUS
Status: COMPLETED | OUTPATIENT
Start: 2022-05-18 | End: 2022-05-18

## 2022-05-18 RX ORDER — ONDANSETRON 2 MG/ML
4 INJECTION INTRAMUSCULAR; INTRAVENOUS
Status: COMPLETED | OUTPATIENT
Start: 2022-05-18 | End: 2022-05-18

## 2022-05-18 RX ORDER — FENTANYL CITRATE 50 UG/ML
25 INJECTION, SOLUTION INTRAMUSCULAR; INTRAVENOUS
Status: COMPLETED | OUTPATIENT
Start: 2022-05-18 | End: 2022-05-18

## 2022-05-18 RX ORDER — TRAZODONE HYDROCHLORIDE 100 MG/1
100 TABLET ORAL
COMMUNITY
End: 2022-09-12

## 2022-05-18 RX ORDER — BENZONATATE 100 MG/1
100 CAPSULE ORAL DAILY
COMMUNITY
Start: 2022-05-02 | End: 2022-09-12

## 2022-05-18 RX ORDER — MUPIROCIN 20 MG/G
OINTMENT TOPICAL
COMMUNITY
Start: 2022-05-02 | End: 2023-02-09 | Stop reason: SDUPTHER

## 2022-05-18 RX ADMIN — ONDANSETRON 4 MG: 2 INJECTION INTRAMUSCULAR; INTRAVENOUS at 05:05

## 2022-05-18 RX ADMIN — DICYCLOMINE HYDROCHLORIDE 20 MG: 20 INJECTION, SOLUTION INTRAMUSCULAR at 06:05

## 2022-05-18 RX ADMIN — KETOROLAC TROMETHAMINE 15 MG: 30 INJECTION, SOLUTION INTRAMUSCULAR at 06:05

## 2022-05-18 RX ADMIN — SODIUM CHLORIDE, POTASSIUM CHLORIDE, SODIUM LACTATE AND CALCIUM CHLORIDE 1000 ML: 600; 310; 30; 20 INJECTION, SOLUTION INTRAVENOUS at 05:05

## 2022-05-18 RX ADMIN — CIPROFLOXACIN 500 MG: 500 TABLET, FILM COATED ORAL at 06:05

## 2022-05-18 RX ADMIN — FENTANYL CITRATE 25 MCG: 50 INJECTION, SOLUTION INTRAMUSCULAR; INTRAVENOUS at 05:05

## 2022-05-18 NOTE — DISCHARGE INSTRUCTIONS
Stayhydrated with lots of water. Ciprofloxacin for 7 days for urine infection. Zofran for nausea / vomiting as needed. Bentyl for abdominal pain (nonnarcotic). Tramadol for more severe pain if needed.

## 2022-05-18 NOTE — ED PROVIDER NOTES
Encounter Date: 5/18/2022       History     Chief Complaint   Patient presents with    Abdominal Pain     Pt to er c/o abd pain onset one week ago. Denies nausea, vomiting or diarrhea.     71 y/o female who presents abdominal pain for the past week but today the pain really intensified. States she hasn't really slept the past 3 nights due to pain. No n/v/d. Had normal BM today. No fever to her knowledge. States moving makes pain worse.     The history is provided by the patient. No  was used.   Abdominal Pain  The current episode started several days ago. The problem has been gradually worsening. The abdominal pain is located in the periumbilical region. The abdominal pain radiates to the periumbilical region. The severity of the abdominal pain is 8/10. The abdominal pain is relieved by nothing. The abdominal pain is exacerbated by movement. The other symptoms of the illness do not include fever, melena, vomiting or diarrhea.   The patient states that she believes she is currently not pregnant. The patient has not had a change in bowel habit.     Review of patient's allergies indicates:   Allergen Reactions    Hydrocodone bitartrate      Other reaction(s): GI Intolerance  Other reaction(s): GI Intolerance  Acetaminophen hydrocodone bitartrate  Acetaminophen hydrocodone bitartrate      Sulfamethoxazole-trimethoprim      Other reaction(s): stomach pain  Other reaction(s): stomach pain      Hydrocodone-acetaminophen Nausea And Vomiting     Other reaction(s): Visual hallucinations  Other reaction(s): Visual hallucinations      Meperidine Nausea And Vomiting     Other reaction(s): GI Intolerance, Visual hallucinations  Other reaction(s): GI Intolerance, Visual hallucinations      Morphine Palpitations     Other reaction(s): chest tightness  Other reaction(s): chest tightness       Past Medical History:   Diagnosis Date    Anxiety     Depression     Diabetes mellitus, type 2     Fibromyalgia      GERD (gastroesophageal reflux disease)     Hyperlipidemia     Hypertension     Hypothyroidism     Nephrolithiasis     Onychomycosis     Osteoporosis      Past Surgical History:   Procedure Laterality Date    APPENDECTOMY      CHOLECYSTECTOMY      COLONOSCOPY  01/16/2015    Rakesh Henriquez MD    HYSTERECTOMY      MASTECTOMY Right     rt kidney stent       No family history on file.  Social History     Tobacco Use    Smoking status: Never Smoker    Smokeless tobacco: Never Used   Substance Use Topics    Alcohol use: Not Currently    Drug use: Never     Review of Systems   Constitutional: Negative for fever.   Gastrointestinal: Positive for abdominal pain. Negative for diarrhea, melena and vomiting.   All other systems reviewed and are negative.      Physical Exam     Initial Vitals [05/18/22 1612]   BP Pulse Resp Temp SpO2   110/62 84 18 97.3 °F (36.3 °C) 98 %      MAP       --         Physical Exam    Nursing note and vitals reviewed.  Constitutional: She appears well-developed and well-nourished.   Grimacing with moving, appears in pain   Eyes: Conjunctivae are normal.   Cardiovascular: Normal rate and regular rhythm.   Pulmonary/Chest: Breath sounds normal. No respiratory distress.   Abdominal: Abdomen is soft. Bowel sounds are decreased. There is abdominal tenderness in the periumbilical area.     Neurological: She is alert and oriented to person, place, and time. She has normal strength.   Skin: Skin is warm and dry.   Psychiatric: She has a normal mood and affect.         ED Course   Procedures  Labs Reviewed   CBC WITH DIFFERENTIAL - Abnormal; Notable for the following components:       Result Value    RBC 3.70 (*)     Hgb 11.8 (*)     Hct 35.6 (*)     MCV 96.2 (*)     MCH 31.9 (*)     MPV 8.9 (*)     IG# 0.02 (*)     All other components within normal limits   COMPREHENSIVE METABOLIC PANEL - Abnormal; Notable for the following components:    Glucose Level 201 (*)     Blood Urea Nitrogen  23.9 (*)     Creatinine 1.39 (*)     Albumin/Globulin Ratio 1.0 (*)     All other components within normal limits   URINALYSIS, REFLEX TO URINE CULTURE - Abnormal; Notable for the following components:    Appearance, UA Hazy (*)     Blood, UA Small (*)     Leukocyte Esterase, UA Small (*)     All other components within normal limits   URINALYSIS, MICROSCOPIC - Abnormal; Notable for the following components:    Bacteria, UA Many (*)     WBC, UA 11-20 (*)     Squamous Epithelial Cells, UA Many (*)     All other components within normal limits   LIPASE - Normal   CULTURE, URINE          Imaging Results          CT Abdomen Pelvis  Without Contrast (Final result)  Result time 05/18/22 18:05:22    Final result by Jayesh Azul MD (05/18/22 18:05:22)                 Impression:      Small volume gas in the bladder lumen, suspect this relates to recent catheterization.  Cystitis also possible, but less likely.    Otherwise, no acute process is identified.      Electronically signed by: Jayesh Azul  Date:    05/18/2022  Time:    18:05             Narrative:    EXAMINATION:  CT ABDOMEN PELVIS WITHOUT CONTRAST    CLINICAL HISTORY:  Abdominal pain, acute, nonlocalized;abdominal pain; periumbilical;    TECHNIQUE:  CT imaging of the abdomen and pelvis without intravenous contrast. Axial, coronal and sagittal reformatted images reviewed. Dose length product is 410 mGycm. Automatic exposure control, adjustment of mA/kV or iterative reconstruction technique used to limit radiation dose.    COMPARISON:  No relevant comparison studies available at the time of dictation.    FINDINGS:  Assessment of the visceral organs and vasculature is limited by the lack of IV contrast.    Liver/biliary: No concerning hepatic findings. Prior cholecystectomy.  No biliary dilatation.    Pancreas: Normal.    Spleen: Normal.    Adrenals: Normal.    Genitourinary: No hydronephrosis or defined urinary stone.  Small volume gas in the bladder  lumen.    Stomach/bowel: Small hiatal hernia.  No bowel obstruction.  Appendix not confidently seen.  Mild distal colonic diverticulosis with no discernible pericolonic inflammation.    Lymph nodes: No pathologically enlarged lymph node identified with noncontrast technique.    Peritoneum: No ascites or free air.    Vasculature: Moderate aortic and iliac artery calcification.    Abdominal wall: No concerning findings.    Lung bases: No consolidation or pleural effusion.    Bones: No acute osseous findings.                                 Medications   lactated ringers bolus 1,000 mL (0 mLs Intravenous Stopped 5/18/22 1807)   ondansetron injection 4 mg (4 mg Intravenous Given 5/18/22 1707)   fentaNYL injection 25 mcg (25 mcg Intravenous Given 5/18/22 1705)   ketorolac injection 15 mg (15 mg Intravenous Given 5/18/22 1831)   ciprofloxacin HCl tablet 500 mg (500 mg Oral Given 5/18/22 1831)   dicyclomine injection 20 mg (20 mg Intramuscular Given 5/18/22 1831)     Medical Decision Making:   Clinical Tests:   Lab Tests: Ordered and Reviewed  Radiological Study: Ordered and Reviewed    Additional MDM:   Differential Diagnosis:   Other: The following diagnoses were also considered and will be evaluated: uti, colitis.           ED Course as of 05/18/22 1931   Wed May 18, 2022   1804 Urinalysis, Microscopic(!) [EV]      ED Course User Index  [EV] MACR Dubois             Clinical Impression:   Final diagnoses:  [N30.00] Acute cystitis without hematuria (Primary)  [R10.33] Periumbilical abdominal pain          ED Disposition Condition    Discharge Stable        ED Prescriptions     Medication Sig Dispense Start Date End Date Auth. Provider    ciprofloxacin HCl (CIPRO) 500 MG tablet Take 1 tablet (500 mg total) by mouth 2 (two) times daily. for 7 days 14 tablet 5/18/2022 5/25/2022 MARC Dubois    dicyclomine (BENTYL) 20 mg tablet Take 1 tablet (20 mg total) by mouth 3 (three) times daily. for 7 days 21 tablet  5/18/2022 5/25/2022 MARC Dubois    traMADoL (ULTRAM) 50 mg tablet Take 1 tablet (50 mg total) by mouth every 8 (eight) hours as needed for Pain. 15 tablet 5/18/2022 5/23/2022 MARC Dubois    ondansetron (ZOFRAN-ODT) 4 MG TbDL Take 1 tablet (4 mg total) by mouth every 8 (eight) hours as needed (nausea/vomiting). 21 tablet 5/18/2022 5/25/2022 MARC Dubois        Follow-up Information     Follow up With Specialties Details Why Contact Info    Jany Magana MD Family Medicine Call in 3 days for follow up [address] [place] [address]  [address]  [facility] [id number]  [phone]             MARC Dubois  05/18/22 1932

## 2022-05-18 NOTE — ASSESSMENT & PLAN NOTE
ABD tenderness noted  Imaging/Labs needed; patient states unable to wait  Patient is weak and ill appearing; hypotension noted  Sent to ED for evaluation

## 2022-05-18 NOTE — PROGRESS NOTES
"Subjective:      Patient ID: Claire Ang is a 72 y.o. White female      Chief Complaint: Follow-up (Stomach ache- started on 5/15/22)       Past Medical History:   Diagnosis Date    Anxiety     Depression     Diabetes mellitus, type 2     Fibromyalgia     GERD (gastroesophageal reflux disease)     Hyperlipidemia     Hypertension     Hypothyroidism     Nephrolithiasis     Onychomycosis     Osteoporosis         HPI  C/O of ABD pain of umbilical/lower ABD, which started last while eating crab soup.  Describes as a "gripping pain."  Associated with diaphoresis. Denies any nausea, vomiting, black/bloody stools, constipation, or diarrhea.  Took Pepcid that night with some relief of pain, but pain has never subsided.  Aggravated when eating.  Unable to identify and precipitating or alleviating factors.  Denies any fever, chills, or body aches, CP, or SOB.    Of note, patient complains of extreme weakness.  Hypotension noted as well.         Review of Systems   Constitutional: Negative.  Negative for appetite change, chills and fever.   HENT: Negative.    Eyes: Negative.  Negative for discharge and redness.   Respiratory: Negative.  Negative for shortness of breath.    Cardiovascular: Negative.  Negative for chest pain.   Gastrointestinal: Positive for abdominal pain. Negative for blood in stool, constipation, diarrhea, nausea and vomiting.   Endocrine: Negative.    Integumentary:  Negative.   Allergic/Immunologic: Negative.    Neurological: Negative.    Psychiatric/Behavioral: Negative.    All other systems reviewed and are negative.         Objective:      Vitals:    05/18/22 1517   BP: 101/63   Pulse: 91   Resp: 16      Body mass index is 27.72 kg/m².     Physical Exam  Vitals reviewed.   Constitutional:       Appearance: She is ill-appearing.   HENT:      Head: Normocephalic.      Mouth/Throat:      Mouth: Mucous membranes are moist.   Eyes:      Conjunctiva/sclera: Conjunctivae normal.      Pupils: " Pupils are equal, round, and reactive to light.   Cardiovascular:      Rate and Rhythm: Normal rate and regular rhythm.   Pulmonary:      Effort: Pulmonary effort is normal. No respiratory distress.      Breath sounds: Normal breath sounds.   Abdominal:      General: Bowel sounds are normal.      Tenderness: There is abdominal tenderness in the periumbilical area and left lower quadrant. There is guarding.   Musculoskeletal:         General: Normal range of motion.      Cervical back: Normal range of motion and neck supple.   Skin:     General: Skin is warm and dry.   Neurological:      Mental Status: She is alert and oriented to person, place, and time.   Psychiatric:         Mood and Affect: Mood normal.          No current facility-administered medications for this visit.    Current Outpatient Medications:     benzonatate (TESSALON) 100 MG capsule, Take 100 mg by mouth Daily., Disp: , Rfl:     busPIRone (BUSPAR) 5 MG Tab, Take 5 mg by mouth 2 (two) times daily., Disp: , Rfl:     cetirizine (ZYRTEC) 5 MG tablet, Take 5 mg by mouth daily as needed., Disp: , Rfl:     cyanocobalamin, vitamin B-12, 1,000 mcg Subl, Take by mouth., Disp: , Rfl:     famotidine (PEPCID) 20 MG tablet, Take 20 mg by mouth daily as needed., Disp: , Rfl:     fluticasone propionate (FLONASE) 50 mcg/actuation nasal spray, USE 1 SPRAY IN EACH NOSTRIL ONCE DAILY FOR 14 DAYS, Disp: , Rfl:     FREESTYLE PAULO 14 DAY SENSOR Kit, USE TO MONITOR BLOOD SUGAR (DX E11.9), Disp: , Rfl:     glimepiride (AMARYL) 2 MG tablet, Take 2 mg by mouth once daily., Disp: , Rfl:     mupirocin (BACTROBAN) 2 % ointment, APPLY THIN FILM TO AFFECTED AREA THREE TIMES DAILY, Disp: , Rfl:     pantoprazole (PROTONIX) 40 MG tablet, Take 40 mg by mouth once daily., Disp: , Rfl:     predniSONE (DELTASONE) 20 MG tablet, Take 20 mg by mouth Daily., Disp: , Rfl:     SYNTHROID 75 mcg tablet, Take 75 mcg by mouth once daily., Disp: , Rfl:     traZODone (DESYREL) 100 MG  tablet, Take 100 mg by mouth., Disp: , Rfl:     valsartan-hydrochlorothiazide (DIOVAN-HCT) 320-25 mg per tablet, Take 1 tablet by mouth once daily., Disp: , Rfl:     WELLBUTRIN  mg SR12, Take 200 mg by mouth Daily., Disp: , Rfl:     Facility-Administered Medications Ordered in Other Visits:     lactated ringers bolus 1,000 mL, 1,000 mL, Intravenous, ED 1 Time, MARC Dubois, Last Rate: 999 mL/hr at 05/18/22 1707, 1,000 mL at 05/18/22 1707    Assessment & Plan:     Problem List Items Addressed This Visit        GI    Abdominal pain - Primary     ABD tenderness noted  Imaging/Labs needed; patient states unable to wait  Patient is weak and ill appearing; hypotension noted  Sent to ED for evaluation              Other    Generalized weakness     ABD tenderness noted  Imaging/Labs needed; patient states unable to wait  Patient is weak and ill appearing; hypotension noted  Sent to ED for evaluation

## 2022-05-19 ENCOUNTER — TELEPHONE (OUTPATIENT)
Dept: FAMILY MEDICINE | Facility: CLINIC | Age: 72
End: 2022-05-19
Payer: MEDICARE

## 2022-05-19 NOTE — TELEPHONE ENCOUNTER
----- Message from MARC Connors sent at 5/19/2022  1:21 PM CDT -----  Please call and check on.  Has ABD pain improved (ED visit on yesterday).      ----- Message -----  From: MARC Connors  Sent: 5/19/2022   7:00 AM CDT  To: MARC Connors    Call and check on; ED visit        Pt stated she is doing feeling much better after taking medication for stomach.

## 2022-05-20 LAB — BACTERIA UR CULT: ABNORMAL

## 2022-05-23 RX ORDER — NITROFURANTOIN 25; 75 MG/1; MG/1
100 CAPSULE ORAL 2 TIMES DAILY
Qty: 14 CAPSULE | Refills: 0 | Status: SHIPPED | OUTPATIENT
Start: 2022-05-23 | End: 2022-05-30

## 2022-05-23 NOTE — PROGRESS NOTES
Please notify the patient to discontinue the Cipro due to resistance of the infection she has and start Macrobid which was sent to her pharmacy.  She needs to follow up with her PCP for further care and return to the ER if her symptoms are worsening.

## 2022-05-26 ENCOUNTER — OFFICE VISIT (OUTPATIENT)
Dept: FAMILY MEDICINE | Facility: CLINIC | Age: 72
End: 2022-05-26
Payer: MEDICARE

## 2022-05-26 VITALS
HEART RATE: 84 BPM | BODY MASS INDEX: 27.98 KG/M2 | TEMPERATURE: 98 F | SYSTOLIC BLOOD PRESSURE: 110 MMHG | HEIGHT: 64 IN | DIASTOLIC BLOOD PRESSURE: 74 MMHG | RESPIRATION RATE: 18 BRPM | WEIGHT: 163.88 LBS | OXYGEN SATURATION: 98 %

## 2022-05-26 DIAGNOSIS — E11.9 TYPE 2 DIABETES MELLITUS WITHOUT COMPLICATION, WITHOUT LONG-TERM CURRENT USE OF INSULIN: ICD-10-CM

## 2022-05-26 DIAGNOSIS — C44.601 MALIGNANT NEOPLASM OF SKIN OF UPPER EXTREMITY, UNSPECIFIED LATERALITY: ICD-10-CM

## 2022-05-26 DIAGNOSIS — F41.9 ANXIETY: ICD-10-CM

## 2022-05-26 DIAGNOSIS — M81.0 AGE-RELATED OSTEOPOROSIS WITHOUT CURRENT PATHOLOGICAL FRACTURE: ICD-10-CM

## 2022-05-26 DIAGNOSIS — K21.9 GASTROESOPHAGEAL REFLUX DISEASE, UNSPECIFIED WHETHER ESOPHAGITIS PRESENT: Primary | ICD-10-CM

## 2022-05-26 DIAGNOSIS — Z78.0 POSTMENOPAUSAL: ICD-10-CM

## 2022-05-26 DIAGNOSIS — E03.9 HYPOTHYROIDISM, UNSPECIFIED TYPE: ICD-10-CM

## 2022-05-26 DIAGNOSIS — F32.A DEPRESSIVE DISORDER: ICD-10-CM

## 2022-05-26 DIAGNOSIS — I10 BENIGN HYPERTENSION: ICD-10-CM

## 2022-05-26 DIAGNOSIS — E78.5 HYPERLIPIDEMIA, UNSPECIFIED HYPERLIPIDEMIA TYPE: ICD-10-CM

## 2022-05-26 DIAGNOSIS — Z85.3 HISTORY OF BREAST CANCER: ICD-10-CM

## 2022-05-26 PROBLEM — Z00.00 WELLNESS EXAMINATION: Status: ACTIVE | Noted: 2022-05-26

## 2022-05-26 PROBLEM — Z92.21 HISTORY OF ANTINEOPLASTIC CHEMOTHERAPY: Status: ACTIVE | Noted: 2022-05-26

## 2022-05-26 PROBLEM — R53.1 GENERALIZED WEAKNESS: Status: RESOLVED | Noted: 2022-05-18 | Resolved: 2022-05-26

## 2022-05-26 PROBLEM — R10.9 ABDOMINAL PAIN: Status: RESOLVED | Noted: 2022-05-18 | Resolved: 2022-05-26

## 2022-05-26 PROBLEM — A60.00 GENITAL HERPES SIMPLEX: Status: ACTIVE | Noted: 2022-05-26

## 2022-05-26 PROCEDURE — 99214 PR OFFICE/OUTPT VISIT, EST, LEVL IV, 30-39 MIN: ICD-10-PCS | Mod: ,,, | Performed by: FAMILY MEDICINE

## 2022-05-26 PROCEDURE — 99214 OFFICE O/P EST MOD 30 MIN: CPT | Mod: ,,, | Performed by: FAMILY MEDICINE

## 2022-05-26 RX ORDER — FLASH GLUCOSE SENSOR
KIT MISCELLANEOUS
Qty: 2 KIT | Refills: 11 | Status: SHIPPED | OUTPATIENT
Start: 2022-05-26 | End: 2023-04-10 | Stop reason: SDUPTHER

## 2022-05-26 RX ORDER — CITALOPRAM 40 MG/1
40 TABLET, FILM COATED ORAL DAILY
COMMUNITY
Start: 2022-05-12 | End: 2022-09-12

## 2022-05-26 RX ORDER — CIPROFLOXACIN 500 MG/1
500 TABLET ORAL 2 TIMES DAILY
Qty: 14 TABLET | Refills: 0 | Status: SHIPPED | OUTPATIENT
Start: 2022-05-26 | End: 2022-06-02

## 2022-05-26 NOTE — ASSESSMENT & PLAN NOTE
On PPI and H2 blocker.  Needs to establish with new GI since dr. tavera retired.  Will place referral for dr. Casa noble

## 2022-05-26 NOTE — ASSESSMENT & PLAN NOTE
On prolia, calcium and vit d.  Encourage weight bearing exercises. Last dexa 8/2021 showed osteoporosis and recommended repeat in 1 year. Order placed

## 2022-05-26 NOTE — ASSESSMENT & PLAN NOTE
Lab Results   Component Value Date    TSH 0.9714 12/28/2021   stable on current meds. Repeat labs ordered.

## 2022-05-26 NOTE — ASSESSMENT & PLAN NOTE
Lab Results   Component Value Date    HGBA1C 7.5 (H) 12/28/2021     On glimepiride.  Repeat labs ordered.  Not on statin- unable to tolerate.  On arb    Foot exam 11/2021  Eye exam with dr. de la rosa   Urine micro 7/2021    Repeat labs ordered. Will call with results when available.

## 2022-05-26 NOTE — PROGRESS NOTES
Subjective:        Patient ID: Claire Ang is a 72 y.o. female.    Chief Complaint: Follow-up (Patient went to [facility] ED 5/15/22 for abdominal pain. States she was diagnosed with an inflamed bladder and UTI. Patient stated MRI and blood work was done at ED visit)        Patient presents to the clinic unaccompanied for emergency room follow-up.      She presented to the wellness clinic on May 18, 2022.  Due to her weakness and hypotension and abdominal pain she was referred to the emergency room.  Labs and CT scan were done showing cystitis/ UTI.  The patient was given Cipro, Bentyl, Zofran and tramadol and discharged home.  She states she has 1 more day of the Cipro.  She denies any spasms.  She denies any problems with bowel movements urination eating or drinking.  She has not had to take the Bentyl or tramadol or Zofran.  She states she will be going out of the country to [place] in 2 weeks.  She is asking for a prescription of Cipro to fill and bring with her in case her symptoms flare up again.      She also has a history of acid reflux and reports compliance with her PPI and H2 blocker.  Previously she saw Dr. Tavera and is requesting to establish with a new Miller, Dr. Casa Christopher.  We will place referral as requested.   Her last colonoscopy was with Dr. tavera 1/16/15  Which showed external hemorrhoids and sigmoid diverticulosis         she has a history of diabetes.  She reports compliance with her medications.  Her last A1c was 7.6 December of 2021. Her last foot exam was November 2021.  Her ophthalmologist is Dr. Burr who told her that she had the beginnings of macular degeneration and was referred to Dr. Vick whom she saw on March 25.   She was previously on metformin but this was stopped after her labs of April 2021 showed elevated creatinine.  This was replaced with glipizide but made her nauseous so she stopped. she is currently on glimepiride.      She has a history of hypertension and  hyperlipidemia.  Her cardiologist is Dr. Daigle.  She is not taking statin due to knee pain.      She has a history of hypothyroidism and is on Synthroid name brand.      She has a history of anxiety depression and follows with Psychiatry, Dr. Aguirre.  She is on Wellbutrin and BuSpar.  She states she filed for divorce from her      she has a history of breast cancer and has had a right mastectomy.  She is followed by Dr. Tsang.  She is on tamoxifen.  She wears a prosthetic. Last mammogram 1/2022 was normal. She has had a  Complete hysterectomy at age 37 her heavy menses and therefore no longer does Pap smears      She has a history of osteoporosis and is on Prolia infusions q.6 months.  She also takes calcium and vitamin-D.  Her last bone density test was  August 2021     she has a history of skin cancer.  Her dermatologist is Dr. Trujillo      Review of Systems   Constitutional: Negative.    HENT: Negative.    Respiratory: Negative.    Cardiovascular: Negative.    Gastrointestinal: Negative.  Negative for abdominal pain, constipation, diarrhea, nausea, rectal pain and vomiting.   Genitourinary: Negative.  Negative for difficulty urinating, dysuria, frequency, pelvic pain and urgency.         Review of patient's allergies indicates:   Allergen Reactions    Hydrocodone bitartrate      Other reaction(s): GI Intolerance  Other reaction(s): GI Intolerance  Acetaminophen hydrocodone bitartrate  Acetaminophen hydrocodone bitartrate      Sulfamethoxazole-trimethoprim      Other reaction(s): stomach pain  Other reaction(s): stomach pain      Hydrocodone-acetaminophen Nausea And Vomiting     Other reaction(s): Visual hallucinations  Other reaction(s): Visual hallucinations      Meperidine Nausea And Vomiting     Other reaction(s): GI Intolerance, Visual hallucinations  Other reaction(s): GI Intolerance, Visual hallucinations      Morphine Palpitations     Other reaction(s): chest tightness  Other reaction(s):  "chest tightness        Vitals:    22 0905   BP: 110/74   BP Location: Left arm   Pulse: 84   Resp: 18   Temp: 97.6 °F (36.4 °C)   SpO2: 98%   Weight: 74.3 kg (163 lb 14.4 oz)   Height: 5' 4" (1.626 m)      Social History     Socioeconomic History    Marital status:    Tobacco Use    Smoking status: Never Smoker    Smokeless tobacco: Never Used   Substance and Sexual Activity    Alcohol use: Not Currently    Drug use: Never      No family history on file.       Objective:     Physical Exam  Vitals and nursing note reviewed.   Constitutional:       Appearance: Normal appearance. She is obese.   HENT:      Head: Normocephalic and atraumatic.   Eyes:      Extraocular Movements: Extraocular movements intact.   Cardiovascular:      Rate and Rhythm: Normal rate and regular rhythm.      Pulses: Normal pulses.      Heart sounds: Normal heart sounds.   Pulmonary:      Effort: Pulmonary effort is normal.      Breath sounds: Normal breath sounds.   Musculoskeletal:         General: Normal range of motion.      Cervical back: Normal range of motion.   Skin:     General: Skin is warm and dry.   Neurological:      General: No focal deficit present.      Mental Status: She is alert and oriented to person, place, and time. Mental status is at baseline.   Psychiatric:         Mood and Affect: Mood normal.       Current Outpatient Medications on File Prior to Visit   Medication Sig Dispense Refill    benzonatate (TESSALON) 100 MG capsule Take 100 mg by mouth Daily.      busPIRone (BUSPAR) 5 MG Tab Take 5 mg by mouth 2 (two) times daily.      cetirizine (ZYRTEC) 5 MG tablet Take 5 mg by mouth daily as needed.      citalopram (CELEXA) 40 MG tablet Take 40 mg by mouth once daily.      cyanocobalamin, vitamin B-12, 1,000 mcg Subl Take by mouth.      [] dicyclomine (BENTYL) 20 mg tablet Take 1 tablet (20 mg total) by mouth 3 (three) times daily. for 7 days 21 tablet 0    famotidine (PEPCID) 20 MG tablet Take " 20 mg by mouth daily as needed.      fluticasone propionate (FLONASE) 50 mcg/actuation nasal spray USE 1 SPRAY IN EACH NOSTRIL ONCE DAILY FOR 14 DAYS      glimepiride (AMARYL) 2 MG tablet Take 2 mg by mouth once daily.      mupirocin (BACTROBAN) 2 % ointment APPLY THIN FILM TO AFFECTED AREA THREE TIMES DAILY      nitrofurantoin, macrocrystal-monohydrate, (MACROBID) 100 MG capsule Take 1 capsule (100 mg total) by mouth 2 (two) times daily. for 7 days 14 capsule 0    [] ondansetron (ZOFRAN-ODT) 4 MG TbDL Take 1 tablet (4 mg total) by mouth every 8 (eight) hours as needed (nausea/vomiting). 21 tablet 0    pantoprazole (PROTONIX) 40 MG tablet Take 40 mg by mouth once daily.      predniSONE (DELTASONE) 20 MG tablet Take 20 mg by mouth Daily.      SYNTHROID 75 mcg tablet Take 75 mcg by mouth once daily.      traZODone (DESYREL) 100 MG tablet Take 100 mg by mouth.      valsartan-hydrochlorothiazide (DIOVAN-HCT) 320-25 mg per tablet Take 1 tablet by mouth once daily.      WELLBUTRIN  mg SR12 Take 200 mg by mouth Daily.      [DISCONTINUED] FREESTYLE PAULO 14 DAY SENSOR Kit USE TO MONITOR BLOOD SUGAR (DX E11.9)       No current facility-administered medications on file prior to visit.     Health Maintenance   Topic Date Due    Hepatitis C Screening  Never done    Foot Exam  Never done    Low Dose Statin  Never done    TETANUS VACCINE  2016    Eye Exam  2020    Hemoglobin A1c  2022    Lipid Panel  2022    Mammogram  2023    DEXA Scan  2023      Results for orders placed or performed during the hospital encounter of 22   Urine culture    Specimen: Urine   Result Value Ref Range    Urine Culture >/= 100,000 colonies/ml Escherichia coli (A)        Susceptibility    Escherichia coli -  (no method available)     Ampicillin >=32 Resistant      Cefepime <=0.12 Sensitive      Ceftriaxone <=0.25 Sensitive      Cefuroxime 8  Sensitive      Ciprofloxacin >=4  Resistant      Gentamicin <=1 Sensitive      Levofloxacin >=8 Resistant      Meropenem <=0.25 Sensitive      Nitrofurantoin <=16 Sensitive      Piperacillin/Tazobactam <=4 Sensitive      Trimethoprim/Sulfamethoxazole <=20 Sensitive      Tetracycline <=1 Sensitive      Tobramycin <=1 Sensitive    CBC with Differential   Result Value Ref Range    WBC 9.5 4.5 - 11.5 x10(3)/mcL    RBC 3.70 (L) 4.20 - 5.40 x10(6)/mcL    Hgb 11.8 (L) 12.0 - 16.0 gm/dL    Hct 35.6 (L) 37.0 - 47.0 %    MCV 96.2 (H) 80.0 - 94.0 fL    MCH 31.9 (H) 27.0 - 31.0 pg    MCHC 33.1 33.0 - 36.0 mg/dL    RDW 13.2 11.5 - 17.0 %    Platelet 220 130 - 400 x10(3)/mcL    MPV 8.9 (L) 9.4 - 12.4 fL    Neut % 55.0 %    Lymph % 35.9 %    Mono % 7.1 %    Eos % 1.5 %    Basophil % 0.3 %    Lymph # 3.41 0.6 - 4.6 x10(3)/mcL    Neut # 5.2 2.1 - 9.2 x10(3)/mcL    Mono # 0.67 0.1 - 1.3 x10(3)/mcL    Eos # 0.14 0 - 0.9 x10(3)/mcL    Baso # 0.03 0 - 0.2 x10(3)/mcL    IG# 0.02 (H) 0 - 0.0155 x10(3)/mcL    IG% 0.2 0 - 0.43 %    NRBC% 0.0 %   Comprehensive Metabolic Panel   Result Value Ref Range    Sodium Level 139 136 - 145 mmol/L    Potassium Level 3.5 3.5 - 5.1 mmol/L    Chloride 101 98 - 107 mmol/L    Carbon Dioxide 28 23 - 31 mmol/L    Glucose Level 201 (H) 82 - 115 mg/dL    Blood Urea Nitrogen 23.9 (H) 9.8 - 20.1 mg/dL    Creatinine 1.39 (H) 0.55 - 1.02 mg/dL    Calcium Level Total 9.8 8.4 - 10.2 mg/dL    Protein Total 7.0 5.8 - 7.6 gm/dL    Albumin Level 3.5 3.4 - 4.8 gm/dL    Globulin 3.5 2.4 - 3.5 gm/dL    Albumin/Globulin Ratio 1.0 (L) 1.1 - 2.0 ratio    Bilirubin Total 0.4 <=1.5 mg/dL    Alkaline Phosphatase 70 40 - 150 unit/L    Alanine Aminotransferase 16 0 - 55 unit/L    Aspartate Aminotransferase 17 5 - 34 unit/L    Estimated GFR-Non  40 mls/min/1.73/m2   Lipase   Result Value Ref Range    Lipase Level 24 <=60 U/L   Urinalysis, Reflex to Urine Culture Urine, Clean Catch    Specimen: Urine   Result Value Ref Range    Color, UA Yellow  Yellow, Colorless, Other, Clear    Appearance, UA Hazy (A) Clear    Specific Gravity, UA 1.025     pH, UA 6.0 5.0, 5.5, 6.0, 6.5, 7.0, 7.5, 8.0, 8.5    Protein, UA Negative Negative, 300  mg/dL    Glucose, UA Negative Negative, Normal mg/dL    Ketones, UA Negative Negative, +1, +2, +3, +4, +5, >=160 mg/dL    Blood, UA Small (A) Negative unit/L    Bilirubin, UA Negative Negative mg/dL    Urobilinogen, UA 0.2 0.2, 1.0, Normal mg/dL    Nitrites, UA Negative Negative    Leukocyte Esterase, UA Small (A) Negative, 75  unit/L   Urinalysis, Microscopic   Result Value Ref Range    Bacteria, UA Many (A) None Seen, Rare, Occasional /HPF    RBC, UA 0-2 None Seen, 0-2, 3-5, 0-5 /HPF    WBC, UA 11-20 (A) None Seen, 0-2, 3-5, 0-5 /HPF    Squamous Epithelial Cells, UA Many (A) None Seen, Rare, Occasional, Occ /LPF          Assessment & Plan:     Active Problem List with Overview Notes    Diagnosis Date Noted    Diabetes mellitus 05/26/2022    Benign hypertension 05/26/2022    Hyperlipidemia 05/26/2022    Hypothyroidism 05/26/2022    Anxiety 05/26/2022    Depressive disorder 05/26/2022    History of breast cancer 05/26/2022    History of antineoplastic chemotherapy 05/26/2022    Wellness examination 05/26/2022    Gastroesophageal reflux disease 05/26/2022    Genital herpes simplex 05/26/2022    Malignant neoplasm of skin of upper extremity 05/26/2022    Age-related osteoporosis without current pathological fracture 04/05/2022       1. Gastroesophageal reflux disease, unspecified whether esophagitis present  Assessment & Plan:  On PPI and H2 blocker.  Needs to establish with new GI since dr. tavera retired.  Will place referral for dr. Casa noble    Orders:  -     Ambulatory referral/consult to Gastroenterology  -     Hemoglobin A1C  -     TSH  -     Comprehensive Metabolic Panel    2. Benign hypertension  Assessment & Plan:  Well controlled on current meds. Keep appointments with cardiology    Orders:  -      Hemoglobin A1C  -     TSH  -     Comprehensive Metabolic Panel    3. Hyperlipidemia, unspecified hyperlipidemia type  Assessment & Plan:  Keep appointments with cardiology.  Unable to tolerate statin due to knee pain    Orders:  -     Hemoglobin A1C  -     TSH  -     Comprehensive Metabolic Panel    4. Hypothyroidism, unspecified type  Assessment & Plan:  Lab Results   Component Value Date    TSH 0.9714 12/28/2021   stable on current meds. Repeat labs ordered.       Orders:  -     Hemoglobin A1C  -     TSH  -     Comprehensive Metabolic Panel    5. Type 2 diabetes mellitus without complication, without long-term current use of insulin  Assessment & Plan:  Lab Results   Component Value Date    HGBA1C 7.5 (H) 12/28/2021     On glimepiride.  Repeat labs ordered.  Not on statin- unable to tolerate.  On arb    Foot exam 11/2021  Eye exam with dr. de la rosa   Urine micro 7/2021    Repeat labs ordered. Will call with results when available.    Orders:  -     Hemoglobin A1C  -     TSH  -     Comprehensive Metabolic Panel    6. Malignant neoplasm of skin of upper extremity, unspecified laterality  Assessment & Plan:  Keep appointments with dermatology      7. History of breast cancer  Assessment & Plan:  Keep appointments with oncology. Mammogram 1/2022 normal        8. Depressive disorder  Assessment & Plan:  Stable on current meds. Keep appointments with psych      9. Anxiety  Assessment & Plan:  Stable on current meds. Keep appointments with psych      10. Age-related osteoporosis without current pathological fracture  Assessment & Plan:  On prolia, calcium and vit d.  Encourage weight bearing exercises. Last dexa 8/2021 showed osteoporosis and recommended repeat in 1 year. Order placed    Orders:  -     DXA Bone Density Spine And Hip    11. Postmenopausal  -     DXA Bone Density Spine And Hip    Other orders  -     ciprofloxacin HCl (CIPRO) 500 MG tablet  -     [employer] PAULO 14 DAY SENSOR Kit       Follow up in about 3  months (around 8/26/2022) for chronic conditions.

## 2022-05-27 ENCOUNTER — DOCUMENTATION ONLY (OUTPATIENT)
Dept: ADMINISTRATIVE | Facility: HOSPITAL | Age: 72
End: 2022-05-27
Payer: MEDICARE

## 2022-06-06 ENCOUNTER — LAB VISIT (OUTPATIENT)
Dept: LAB | Facility: HOSPITAL | Age: 72
End: 2022-06-06
Attending: FAMILY MEDICINE
Payer: MEDICARE

## 2022-06-06 DIAGNOSIS — K21.9 GASTROESOPHAGEAL REFLUX DISEASE, UNSPECIFIED WHETHER ESOPHAGITIS PRESENT: ICD-10-CM

## 2022-06-06 DIAGNOSIS — E11.9 TYPE 2 DIABETES MELLITUS WITHOUT COMPLICATION, WITHOUT LONG-TERM CURRENT USE OF INSULIN: ICD-10-CM

## 2022-06-06 DIAGNOSIS — E03.9 HYPOTHYROIDISM, UNSPECIFIED TYPE: ICD-10-CM

## 2022-06-06 DIAGNOSIS — I10 BENIGN HYPERTENSION: ICD-10-CM

## 2022-06-06 DIAGNOSIS — E78.5 HYPERLIPIDEMIA, UNSPECIFIED HYPERLIPIDEMIA TYPE: ICD-10-CM

## 2022-06-06 LAB
ALBUMIN SERPL-MCNC: 3.7 GM/DL (ref 3.4–4.8)
ALBUMIN/GLOB SERPL: 1.1 RATIO (ref 1.1–2)
ALP SERPL-CCNC: 71 UNIT/L (ref 40–150)
ALT SERPL-CCNC: 19 UNIT/L (ref 0–55)
AST SERPL-CCNC: 18 UNIT/L (ref 5–34)
BILIRUBIN DIRECT+TOT PNL SERPL-MCNC: 0.7 MG/DL
BUN SERPL-MCNC: 22.3 MG/DL (ref 9.8–20.1)
CALCIUM SERPL-MCNC: 9.7 MG/DL (ref 8.4–10.2)
CHLORIDE SERPL-SCNC: 105 MMOL/L (ref 98–107)
CO2 SERPL-SCNC: 26 MMOL/L (ref 23–31)
CREAT SERPL-MCNC: 1.31 MG/DL (ref 0.55–1.02)
EST. AVERAGE GLUCOSE BLD GHB EST-MCNC: 159.9 MG/DL
GLOBULIN SER-MCNC: 3.4 GM/DL (ref 2.4–3.5)
GLUCOSE SERPL-MCNC: 109 MG/DL (ref 82–115)
HBA1C MFR BLD: 7.2 %
POTASSIUM SERPL-SCNC: 3.8 MMOL/L (ref 3.5–5.1)
PROT SERPL-MCNC: 7.1 GM/DL (ref 5.8–7.6)
SODIUM SERPL-SCNC: 143 MMOL/L (ref 136–145)
TSH SERPL-ACNC: 1.22 UIU/ML (ref 0.35–4.94)

## 2022-06-06 PROCEDURE — 80053 COMPREHEN METABOLIC PANEL: CPT

## 2022-06-06 PROCEDURE — 36415 COLL VENOUS BLD VENIPUNCTURE: CPT

## 2022-06-06 PROCEDURE — 84443 ASSAY THYROID STIM HORMONE: CPT

## 2022-06-06 PROCEDURE — 83036 HEMOGLOBIN GLYCOSYLATED A1C: CPT

## 2022-06-07 NOTE — PROGRESS NOTES
"These results have been reviewed by your healthcare team.  You are advised to continue your current medications.    Labs are within normal range except:  >bun/creatinine are elevated still but improved slightly. Encourage fluids. Monitor  >a1c 7.2. improved from previous. Good job.  Continue on current meds/dmii diet      Please keep in mind that results may often be outside of the "normal" range while still being acceptable for your diagnosis and your plan of care.  You will be contacted if your results require a change in your medical treatment. If you wish to discuss your results, you will be required to schedule an appointment.   "

## 2022-07-11 ENCOUNTER — TELEPHONE (OUTPATIENT)
Dept: INFUSION THERAPY | Facility: HOSPITAL | Age: 72
End: 2022-07-11
Payer: MEDICARE

## 2022-07-11 DIAGNOSIS — M81.0 AGE-RELATED OSTEOPOROSIS WITHOUT CURRENT PATHOLOGICAL FRACTURE: ICD-10-CM

## 2022-07-13 ENCOUNTER — OFFICE VISIT (OUTPATIENT)
Dept: FAMILY MEDICINE | Facility: CLINIC | Age: 72
End: 2022-07-13
Payer: MEDICARE

## 2022-07-13 VITALS
BODY MASS INDEX: 28.15 KG/M2 | TEMPERATURE: 97 F | OXYGEN SATURATION: 98 % | HEART RATE: 73 BPM | HEIGHT: 64 IN | WEIGHT: 164.88 LBS | SYSTOLIC BLOOD PRESSURE: 126 MMHG | DIASTOLIC BLOOD PRESSURE: 77 MMHG | RESPIRATION RATE: 16 BRPM

## 2022-07-13 DIAGNOSIS — E11.649 HYPOGLYCEMIA ASSOCIATED WITH TYPE 2 DIABETES MELLITUS: ICD-10-CM

## 2022-07-13 DIAGNOSIS — R30.0 DYSURIA: ICD-10-CM

## 2022-07-13 DIAGNOSIS — M25.512 ACUTE PAIN OF LEFT SHOULDER: Primary | ICD-10-CM

## 2022-07-13 PROCEDURE — 81001 URINALYSIS AUTO W/SCOPE: CPT | Performed by: NURSE PRACTITIONER

## 2022-07-13 PROCEDURE — 99214 PR OFFICE/OUTPT VISIT, EST, LEVL IV, 30-39 MIN: ICD-10-PCS | Mod: ,,, | Performed by: NURSE PRACTITIONER

## 2022-07-13 PROCEDURE — 99214 OFFICE O/P EST MOD 30 MIN: CPT | Mod: ,,, | Performed by: NURSE PRACTITIONER

## 2022-07-13 RX ORDER — GLIMEPIRIDE 2 MG/1
1 TABLET ORAL DAILY
Qty: 15 TABLET | Refills: 3 | Status: SHIPPED | OUTPATIENT
Start: 2022-07-13 | End: 2022-10-26

## 2022-07-13 RX ORDER — CLONAZEPAM 0.5 MG/1
0.5 TABLET ORAL DAILY
COMMUNITY
Start: 2022-06-27 | End: 2022-08-17 | Stop reason: CLARIF

## 2022-07-13 NOTE — PROGRESS NOTES
Subjective:      Patient ID: Claire Ang is a 72 y.o. White female      Chief Complaint: Follow-up (Left shoulder pain)       Past Medical History:   Diagnosis Date    Anxiety     Depression     Diabetes mellitus, type 2     Fibromyalgia     Gastric ulcer     GERD (gastroesophageal reflux disease)     Hyperlipidemia     Hypertension     Hypothyroidism     Nephrolithiasis     Onychomycosis     Osteoporosis     Stage 3b chronic kidney disease         HPI  Shoulder Pain   The pain is present in the left shoulder. This is a new problem. The current episode started 1 to 4 weeks ago. There has been a history of trauma (States slipped and fell onto hands; has been having shoulder pain since then). The problem has been gradually worsening. The pain is at a severity of 7/10. The pain is moderate. Associated symptoms include stiffness. Pertinent negatives include no fever. She has tried acetaminophen for the symptoms. The treatment provided no relief.     Hypoglycemia  Hx of DM.  Recent ED visit due to hypoglycemia.  She was treated and sent home.  At office today, patient stated weakness.  She has CGM, blood sugar was 65 at that time.  Patient ate chips and coke, blood sugar is now 135.  Currently taking Glimepiride 2 mg po daily.      Dysuria  Recent UTI.  C/O of dysuria.  No other urinary symptoms.      Review of Systems   Constitutional: Negative.  Negative for appetite change, chills and fever.   HENT: Negative.    Eyes: Negative.  Negative for discharge and redness.   Respiratory: Negative.  Negative for shortness of breath.    Cardiovascular: Negative.  Negative for chest pain.   Gastrointestinal: Negative.    Endocrine: Negative.    Genitourinary: Negative.    Musculoskeletal: Positive for stiffness.   Integumentary:  Negative.   Allergic/Immunologic: Negative.    Neurological: Negative.    Psychiatric/Behavioral: Negative.    All other systems reviewed and are negative.         Objective:       Vitals:    07/13/22 1446   BP: 126/77   Pulse: 73   Resp: 16   Temp: 97.4 °F (36.3 °C)      Body mass index is 28.31 kg/m².     Physical Exam  Vitals reviewed.   Constitutional:       Appearance: Normal appearance.   HENT:      Head: Normocephalic.      Mouth/Throat:      Mouth: Mucous membranes are moist.   Eyes:      Conjunctiva/sclera: Conjunctivae normal.      Pupils: Pupils are equal, round, and reactive to light.   Cardiovascular:      Rate and Rhythm: Normal rate and regular rhythm.   Pulmonary:      Effort: Pulmonary effort is normal. No respiratory distress.      Breath sounds: Normal breath sounds.   Musculoskeletal:      Left shoulder: Tenderness present. Decreased range of motion.      Cervical back: Normal range of motion and neck supple.   Skin:     General: Skin is warm and dry.   Neurological:      Mental Status: She is alert and oriented to person, place, and time.   Psychiatric:         Mood and Affect: Mood normal.            Current Outpatient Medications:     benzonatate (TESSALON) 100 MG capsule, Take 100 mg by mouth Daily., Disp: , Rfl:     busPIRone (BUSPAR) 5 MG Tab, Take 5 mg by mouth 2 (two) times daily., Disp: , Rfl:     cetirizine (ZYRTEC) 5 MG tablet, Take 5 mg by mouth daily as needed., Disp: , Rfl:     citalopram (CELEXA) 40 MG tablet, Take 40 mg by mouth once daily., Disp: , Rfl:     clonazePAM (KLONOPIN) 0.5 MG tablet, Take 0.5 mg by mouth once daily., Disp: , Rfl:     cyanocobalamin, vitamin B-12, 1,000 mcg Subl, Take by mouth., Disp: , Rfl:     famotidine (PEPCID) 20 MG tablet, Take 20 mg by mouth daily as needed., Disp: , Rfl:     fluticasone propionate (FLONASE) 50 mcg/actuation nasal spray, USE 1 SPRAY IN EACH NOSTRIL ONCE DAILY FOR 14 DAYS, Disp: , Rfl:     FREESTYLE PAULO 14 DAY SENSOR Kit, USE TO MONITOR BLOOD SUGAR (DX E11.9), Disp: 2 kit, Rfl: 11    mupirocin (BACTROBAN) 2 % ointment, APPLY THIN FILM TO AFFECTED AREA THREE TIMES DAILY, Disp: , Rfl:      pantoprazole (PROTONIX) 40 MG tablet, Take 40 mg by mouth once daily., Disp: , Rfl:     predniSONE (DELTASONE) 20 MG tablet, Take 20 mg by mouth Daily., Disp: , Rfl:     SYNTHROID 75 mcg tablet, Take 75 mcg by mouth once daily., Disp: , Rfl:     traZODone (DESYREL) 100 MG tablet, Take 100 mg by mouth., Disp: , Rfl:     valsartan-hydrochlorothiazide (DIOVAN-HCT) 320-25 mg per tablet, Take 1 tablet by mouth once daily., Disp: , Rfl:     WELLBUTRIN  mg SR12, Take 200 mg by mouth Daily., Disp: , Rfl:     glimepiride (AMARYL) 2 MG tablet, Take 0.5 tablets (1 mg total) by mouth once daily., Disp: 15 tablet, Rfl: 3    Assessment & Plan:     Problem List Items Addressed This Visit        Renal/    Dysuria     Recent UTI  Will obtain a UA and call with results  Verbalizes understanding           Relevant Orders    Urinalysis, Reflex to Urine Culture Urine, Clean Catch       Endocrine    Hypoglycemia associated with type 2 diabetes mellitus     Recent hypoglycemic episodes  Will decrease glimepiride to 2 mg (1/2 tablet) daily  Instructed to take medication with 1st main meal  Patient has CGM  Educated on hypoglycemia and interventions  Keep appointment next month with PCP for follow-up           Relevant Medications    glimepiride (AMARYL) 2 MG tablet       Orthopedic    Acute pain of left shoulder - Primary     XR today  Unable to take NSAIDS (recent diagnosis of gastric ulcer; results requested; Hx CKD)  No controlled substance (on benzodiazepine)   OTC ES Tylenol prn  Referral placed to Ortho for evaluation           Relevant Orders    X-Ray Shoulder 2 or More Views Left    Ambulatory referral/consult to Orthopedics

## 2022-07-13 NOTE — ASSESSMENT & PLAN NOTE
Recent hypoglycemic episodes  Will decrease glimepiride to 2 mg (1/2 tablet) daily  Instructed to take medication with 1st main meal  Patient has CGM  Educated on hypoglycemia and interventions  Keep appointment next month with PCP for follow-up

## 2022-07-13 NOTE — ASSESSMENT & PLAN NOTE
XR today  Unable to take NSAIDS (recent diagnosis of gastric ulcer; results requested; Hx CKD)  No controlled substance (on benzodiazepine)   OTC ES Tylenol prn  Referral placed to Ortho for evaluation

## 2022-07-14 DIAGNOSIS — R82.71 BACTERIURIA: Primary | ICD-10-CM

## 2022-07-15 RX ORDER — NITROFURANTOIN 25; 75 MG/1; MG/1
100 CAPSULE ORAL 2 TIMES DAILY
Qty: 14 CAPSULE | Refills: 0 | Status: SHIPPED | OUTPATIENT
Start: 2022-07-15 | End: 2022-07-22

## 2022-07-16 LAB — BACTERIA UR CULT: ABNORMAL

## 2022-07-18 ENCOUNTER — TELEPHONE (OUTPATIENT)
Dept: INFUSION THERAPY | Facility: HOSPITAL | Age: 72
End: 2022-07-18
Payer: MEDICARE

## 2022-07-18 DIAGNOSIS — N10 ACUTE PYELONEPHRITIS: Primary | ICD-10-CM

## 2022-07-18 DIAGNOSIS — N64.4 BREAST PAIN, LEFT: Primary | ICD-10-CM

## 2022-07-21 ENCOUNTER — HOSPITAL ENCOUNTER (OUTPATIENT)
Dept: RADIOLOGY | Facility: HOSPITAL | Age: 72
Discharge: HOME | End: 2022-07-21
Attending: NURSE PRACTITIONER
Payer: MEDICARE

## 2022-07-21 DIAGNOSIS — N64.4 BREAST PAIN, LEFT: ICD-10-CM

## 2022-07-21 DIAGNOSIS — N64.4 PAIN OF LEFT BREAST: ICD-10-CM

## 2022-07-21 PROCEDURE — 77061 BREAST TOMOSYNTHESIS UNI: CPT | Mod: 26,LT,, | Performed by: RADIOLOGY

## 2022-07-21 PROCEDURE — 76641 US BREAST LEFT COMPLETE: ICD-10-PCS | Mod: 26,LT,, | Performed by: RADIOLOGY

## 2022-07-21 PROCEDURE — 76641 ULTRASOUND BREAST COMPLETE: CPT | Mod: TC,LT

## 2022-07-21 PROCEDURE — 77065 MAMMO DIGITAL DIAGNOSTIC LEFT WITH TOMO: ICD-10-PCS | Mod: 26,LT,, | Performed by: RADIOLOGY

## 2022-07-21 PROCEDURE — 77065 DX MAMMO INCL CAD UNI: CPT | Mod: 26,LT,, | Performed by: RADIOLOGY

## 2022-07-21 PROCEDURE — 77061 MAMMO DIGITAL DIAGNOSTIC LEFT WITH TOMO: ICD-10-PCS | Mod: 26,LT,, | Performed by: RADIOLOGY

## 2022-07-21 PROCEDURE — 77065 DX MAMMO INCL CAD UNI: CPT | Mod: TC,LT

## 2022-07-21 PROCEDURE — 76641 ULTRASOUND BREAST COMPLETE: CPT | Mod: 26,LT,, | Performed by: RADIOLOGY

## 2022-07-28 RX ORDER — LEVOTHYROXINE SODIUM 75 UG/1
75 TABLET ORAL DAILY
COMMUNITY
Start: 2022-03-14 | End: 2022-07-28 | Stop reason: SDUPTHER

## 2022-07-28 RX ORDER — LEVOTHYROXINE SODIUM 75 UG/1
75 TABLET ORAL DAILY
Qty: 90 TABLET | Refills: 3 | Status: SHIPPED | OUTPATIENT
Start: 2022-07-28 | End: 2022-10-26

## 2022-07-28 NOTE — TELEPHONE ENCOUNTER
Patient requesting flexeril. This medication is not listed on med list in Epic or Dr. Tariff, also not seen listed in previous OVN.

## 2022-07-28 NOTE — TELEPHONE ENCOUNTER
----- Message from Cristal Perales sent at 7/28/2022 11:20 AM CDT -----  Regarding: refill  Type:  RX Refill Request    Who Called: pt  Refill or New Rx:refill  RX Name and Strength:synthroid (not generic)  How is the patient currently taking it? (ex. 1XDay):1 day  Is this a 30 day or 90 day RX:90    Refill or New Rx:refill  RX Name and Strength:flexerill  How is the patient currently taking it? (ex. 1XDay):as needed  Is this a 30 day or 90 day RX:30    [facility] phone number[facility]  Local or Mail Order:[place]  Ordering Provider:local  Would the patient rather a call back or a response via [facility]? C/b  Best Call Back Number:[phone]  Additional Information:

## 2022-08-05 PROBLEM — Z17.0 MALIGNANT NEOPLASM OF CENTRAL PORTION OF RIGHT BREAST IN FEMALE, ESTROGEN RECEPTOR POSITIVE: Status: ACTIVE | Noted: 2022-08-05

## 2022-08-05 PROBLEM — C50.111 MALIGNANT NEOPLASM OF CENTRAL PORTION OF RIGHT BREAST IN FEMALE, ESTROGEN RECEPTOR POSITIVE: Status: ACTIVE | Noted: 2022-08-05

## 2022-08-05 NOTE — PROGRESS NOTES
Subjective:       Patient ID: Claire Ang is a 72 y.o. female.    Surgeon: Dr. Reji Navas  [facility] medical oncologist: Dr. Claire Delaney  Primary care: Dr. Jany Magana    1. Right Breast Cancer stage IB ([id number])--ER+/VA+Her-2neg diagnosed 10/2015  Biopsy/pathology: Excisional biopsy right breast mass UOQ done 8/12/2015--invasive poorly differentiated ductal carcinoma, Irish grade 3, multifocal 18mm and 5mm in greatest dimension respectively, high grade DCIS associated, invasive carcinoma present at margin, ER 97%, VA 64%, Her2 2+ by IHC and non-amplified by FISH, Ki67 68%  Surgery/pathology: Right breast mastectomy with SLN biopsy 9/9/2015--biopsy site changes with no residual in-situ or invasive carcinoma, 1/6 lymph nodes positive for metastatic carcinoma measures only 0.5mm with no extranodal extension, Oncotype DX score 24 (16% chance of distant recurrence with Tamoxifen alone).    DEXA:  5/30/17--AP spine -0.6, Left femoral neck -1.4, right -2.9, left total hip -0.8, -1.7 c/w osteoporosis.  7/23/19--AP spine 0.9, Left femoral neck -1.4, right -2.5, Left total hip -0.9, right -2.1--improved spine, right femur, worse in right and left total hips.  8/19/21--AP spine 0.4, Left femoral neck -1.6, right -2.7, Left total hip -0.9, right -2.0(mixed response).    Genetic testing BRCA1/2 negative 3/2016.    2. RUE DVT(post-surgical) 9/23/2015--treated with Xarelto (stopped due to hematuria).    Treatment history:  1. Weekly Taxol X 12 and Adriamycin/Cytoxan X 4 cycles completed 4/26/2016.  2. Boniva 11/2016--stopped 2/2017 (unable to tolerate).  3. Femara started 5/17/2016 changed to Arimidex 8/2016 (arthralgias), changed to Aromasin 11/2016 (patient never started).  4. Aromasin 3/26/2018--stopped 5/2018 due to joint problems.  5. Tamoxifen 8/26/18--stopped in 2020 for side effects, patient stopped on her own (unknown dates).  6. Prolia 2/28/17--stopped 8/2022 (due to patient no longer being on  "AI).    Current treatment: Observation    Chief Complaint: Other Misc (Pt reports that she an upper GI and sedation was put through her Mediport so she doesn't think it needs to be flushed. [place] is her gastro.)    HPI   Patient presents for follow-up for breast cancer. She has not been seen in sometime. She is doing well. Reports that she stopped Tamoxifen "a long time ago" due to ongoing joint pains. She is not interested in starting anything else, she was unable to tolerate AIs as well. She recently had EGD that showed PUD and she is being seen by Dr. Peng. She has no other complaints aside from ongoing shoulder pain. She is seeing Dr. Fraser soon.     Past Medical History:   Diagnosis Date    Anxiety     Depression     Diabetes mellitus, type 2     Fibromyalgia     Gastric ulcer     GERD (gastroesophageal reflux disease)     Hyperlipidemia     Hypertension     Hypothyroidism     Nephrolithiasis     Onychomycosis     Osteoporosis     Stage 3b chronic kidney disease       Review of patient's allergies indicates:   Allergen Reactions    Hydrocodone bitartrate      Other reaction(s): GI Intolerance  Other reaction(s): GI Intolerance  Acetaminophen hydrocodone bitartrate  Acetaminophen hydrocodone bitartrate      Sulfamethoxazole-trimethoprim      Other reaction(s): stomach pain  Other reaction(s): stomach pain      Hydrocodone-acetaminophen Nausea And Vomiting     Other reaction(s): Visual hallucinations  Other reaction(s): Visual hallucinations      Meperidine Nausea And Vomiting     Other reaction(s): GI Intolerance, Visual hallucinations  Other reaction(s): GI Intolerance, Visual hallucinations      Morphine Palpitations     Other reaction(s): chest tightness  Other reaction(s): chest tightness        Current Outpatient Medications on File Prior to Visit   Medication Sig Dispense Refill    benzonatate (TESSALON) 100 MG capsule Take 100 mg by mouth Daily.      busPIRone (BUSPAR) 5 MG Tab " Take 5 mg by mouth 2 (two) times daily.      cetirizine (ZYRTEC) 5 MG tablet Take 5 mg by mouth daily as needed.      cyanocobalamin, vitamin B-12, 1,000 mcg Subl Take by mouth.      famotidine (PEPCID) 20 MG tablet Take 20 mg by mouth daily as needed.      FREESTYLE PAULO 14 DAY SENSOR Kit USE TO MONITOR BLOOD SUGAR (DX E11.9) 2 kit 11    glimepiride (AMARYL) 2 MG tablet Take 0.5 tablets (1 mg total) by mouth once daily. 15 tablet 3    mupirocin (BACTROBAN) 2 % ointment APPLY THIN FILM TO AFFECTED AREA THREE TIMES DAILY      pantoprazole (PROTONIX) 40 MG tablet Take 40 mg by mouth once daily.      SYNTHROID 75 mcg tablet Take 75 mcg by mouth once daily.      valsartan-hydrochlorothiazide (DIOVAN-HCT) 320-25 mg per tablet Take 1 tablet by mouth once daily.      WELLBUTRIN  mg SR12 Take 200 mg by mouth Daily.      citalopram (CELEXA) 40 MG tablet Take 40 mg by mouth once daily.      clonazePAM (KLONOPIN) 0.5 MG tablet Take 0.5 mg by mouth once daily.      fluticasone propionate (FLONASE) 50 mcg/actuation nasal spray USE 1 SPRAY IN EACH NOSTRIL ONCE DAILY FOR 14 DAYS      predniSONE (DELTASONE) 20 MG tablet Take 20 mg by mouth Daily.      traZODone (DESYREL) 100 MG tablet Take 100 mg by mouth.       No current facility-administered medications on file prior to visit.      Review of Systems   Constitutional: Negative for appetite change, fatigue, fever and unexpected weight change.   HENT: Negative for mouth sores.    Eyes: Negative.    Respiratory: Negative for cough and shortness of breath.    Cardiovascular: Negative for chest pain and leg swelling.   Gastrointestinal: Negative for abdominal distention, abdominal pain, constipation, diarrhea, nausea, vomiting and reflux.   Genitourinary: Negative for difficulty urinating, dysuria and hematuria.   Musculoskeletal: Negative for arthralgias and back pain.        +shoulder pain   Integumentary:  Negative for rash.   Neurological: Negative for  weakness and headaches.   Hematological: Negative for adenopathy.   Psychiatric/Behavioral: Negative for sleep disturbance. The patient is not nervous/anxious.             Physical Exam  Constitutional:       Appearance: Normal appearance.   HENT:      Head: Normocephalic.      Nose: Nose normal.      Mouth/Throat:      Mouth: Mucous membranes are moist.   Eyes:      Extraocular Movements: Extraocular movements intact.      Conjunctiva/sclera: Conjunctivae normal.   Cardiovascular:      Rate and Rhythm: Normal rate and regular rhythm.   Pulmonary:      Effort: Pulmonary effort is normal.      Breath sounds: Normal breath sounds.   Chest:      Comments: Right chest wall s/p mastectomy with no skin nodules or masses, left breast normal  Abdominal:      General: Bowel sounds are normal. There is no distension.      Palpations: Abdomen is soft.      Tenderness: There is no abdominal tenderness.   Musculoskeletal:         General: Normal range of motion.   Skin:     General: Skin is warm.   Neurological:      General: No focal deficit present.      Mental Status: She is alert and oriented to person, place, and time.   Psychiatric:         Mood and Affect: Mood normal.         Judgment: Judgment normal.         Lab Visit on 08/09/2022   Component Date Value    WBC 08/09/2022 7.8     RBC 08/09/2022 3.56 (A)    Hgb 08/09/2022 11.1 (A)    Hct 08/09/2022 34.5 (A)    MCV 08/09/2022 96.9 (A)    MCH 08/09/2022 31.2 (A)    MCHC 08/09/2022 32.2 (A)    RDW 08/09/2022 13.0     Platelet 08/09/2022 204     MPV 08/09/2022 8.7     Neut % 08/09/2022 52.7     Lymph % 08/09/2022 36.8     Mono % 08/09/2022 5.7     Eos % 08/09/2022 4.3     Basophil % 08/09/2022 0.4     Lymph # 08/09/2022 2.88     Neut # 08/09/2022 4.1     Mono # 08/09/2022 0.45     Eos # 08/09/2022 0.34     Baso # 08/09/2022 0.03     IG# 08/09/2022 0.01     IG% 08/09/2022 0.1             Assessment:       1. Breast cancer screening by mammogram    2.  "Malignant neoplasm of central portion of right breast in female, estrogen receptor positive         Plan:       Patient with stage IB ([id number]) right breast cancer s/p right mastectomy in 10/2015, intermediate risk oncotype, completed adjuvant chemotherapy with Taxol weekly X 12 and AC every 3 weeks X 4 cycles on 4/26/2016.   Patient was started in Femara 5/2016, changed to Arimidex 8/2016 but patient stopped all endocrine therapy in 11/2016.   Aromasin prescribed in 3/2018 and she took only for a few months then stopped due to "crippling" arthritis.  Discussed Tamoxifen and she started it in 9/2018 but stopped for a little while. She restarted in but stopped in 2020 due to ongoing arthritis and side effects.  She does not wish to restart any other treatment at this time.    Currently patient has not signs or symptoms to suggest recurrence of disease.  CBCdiff from recent shows mild anemia. She has some mild renal insufficiency as well.  She does have a recent h/o PUD that may be contributing to her anemia.  Patient instructed to restart MVI with iron daily.     Left screening MMG in 7/2022 benign. Due to repeat in 1 year--ordered today.    Will continue routine surveillance visits.  RTC 1 year for follow-up with labs.  Patient has been on and off Prolia. Most recent DEXA from 8/2021 showed mixed response. She does have some osteoporosis right hip.  Will hold Prolia for now.  Instructed her to have her PCP manage her bone health from now on since I no longer have her on any bone altering medications.       All questions answered at this time.        Jasmyne Tsang MD                          "

## 2022-08-08 DIAGNOSIS — C50.111 MALIGNANT NEOPLASM OF CENTRAL PORTION OF RIGHT FEMALE BREAST, UNSPECIFIED ESTROGEN RECEPTOR STATUS: ICD-10-CM

## 2022-08-08 DIAGNOSIS — C44.601 MALIGNANT NEOPLASM OF SKIN OF UPPER EXTREMITY, UNSPECIFIED LATERALITY: ICD-10-CM

## 2022-08-08 DIAGNOSIS — N64.4 BREAST PAIN, LEFT: Primary | ICD-10-CM

## 2022-08-09 ENCOUNTER — OFFICE VISIT (OUTPATIENT)
Dept: HEMATOLOGY/ONCOLOGY | Facility: CLINIC | Age: 72
End: 2022-08-09
Payer: MEDICARE

## 2022-08-09 VITALS
HEIGHT: 64 IN | OXYGEN SATURATION: 97 % | SYSTOLIC BLOOD PRESSURE: 113 MMHG | DIASTOLIC BLOOD PRESSURE: 56 MMHG | BODY MASS INDEX: 28.07 KG/M2 | WEIGHT: 164.44 LBS | TEMPERATURE: 98 F | HEART RATE: 84 BPM | RESPIRATION RATE: 14 BRPM

## 2022-08-09 DIAGNOSIS — Z17.0 MALIGNANT NEOPLASM OF CENTRAL PORTION OF RIGHT BREAST IN FEMALE, ESTROGEN RECEPTOR POSITIVE: ICD-10-CM

## 2022-08-09 DIAGNOSIS — Z12.31 BREAST CANCER SCREENING BY MAMMOGRAM: Primary | ICD-10-CM

## 2022-08-09 DIAGNOSIS — C50.111 MALIGNANT NEOPLASM OF CENTRAL PORTION OF RIGHT BREAST IN FEMALE, ESTROGEN RECEPTOR POSITIVE: ICD-10-CM

## 2022-08-09 PROCEDURE — 99215 OFFICE O/P EST HI 40 MIN: CPT | Mod: PBBFAC | Performed by: INTERNAL MEDICINE

## 2022-08-09 PROCEDURE — 99999 PR PBB SHADOW E&M-EST. PATIENT-LVL V: ICD-10-PCS | Mod: PBBFAC,,, | Performed by: INTERNAL MEDICINE

## 2022-08-09 PROCEDURE — 99214 OFFICE O/P EST MOD 30 MIN: CPT | Mod: S$PBB,,, | Performed by: INTERNAL MEDICINE

## 2022-08-09 PROCEDURE — 99999 PR PBB SHADOW E&M-EST. PATIENT-LVL V: CPT | Mod: PBBFAC,,, | Performed by: INTERNAL MEDICINE

## 2022-08-09 PROCEDURE — 99214 PR OFFICE/OUTPT VISIT, EST, LEVL IV, 30-39 MIN: ICD-10-PCS | Mod: S$PBB,,, | Performed by: INTERNAL MEDICINE

## 2022-08-10 ENCOUNTER — HOSPITAL ENCOUNTER (OUTPATIENT)
Dept: RADIOLOGY | Facility: CLINIC | Age: 72
Discharge: HOME | End: 2022-08-10
Attending: SPECIALIST
Payer: MEDICARE

## 2022-08-10 ENCOUNTER — OFFICE VISIT (OUTPATIENT)
Dept: ORTHOPEDICS | Facility: CLINIC | Age: 72
End: 2022-08-10
Payer: MEDICARE

## 2022-08-10 VITALS
WEIGHT: 163.19 LBS | BODY MASS INDEX: 27.86 KG/M2 | DIASTOLIC BLOOD PRESSURE: 66 MMHG | HEART RATE: 89 BPM | SYSTOLIC BLOOD PRESSURE: 108 MMHG | HEIGHT: 64 IN

## 2022-08-10 DIAGNOSIS — M25.512 LEFT SHOULDER PAIN, UNSPECIFIED CHRONICITY: ICD-10-CM

## 2022-08-10 DIAGNOSIS — M25.512 ACUTE PAIN OF LEFT SHOULDER: ICD-10-CM

## 2022-08-10 DIAGNOSIS — M25.512 LEFT SHOULDER PAIN, UNSPECIFIED CHRONICITY: Primary | ICD-10-CM

## 2022-08-10 DIAGNOSIS — M75.122 NONTRAUMATIC COMPLETE TEAR OF LEFT ROTATOR CUFF: ICD-10-CM

## 2022-08-10 PROCEDURE — 20610 DRAIN/INJ JOINT/BURSA W/O US: CPT | Mod: LT,,, | Performed by: SPECIALIST

## 2022-08-10 PROCEDURE — 73030 X-RAY EXAM OF SHOULDER: CPT | Mod: LT,,, | Performed by: SPECIALIST

## 2022-08-10 PROCEDURE — 99203 OFFICE O/P NEW LOW 30 MIN: CPT | Mod: 25,,, | Performed by: SPECIALIST

## 2022-08-10 PROCEDURE — 20610 LARGE JOINT ASPIRATION/INJECTION: L SUBACROMIAL BURSA: ICD-10-PCS | Mod: LT,,, | Performed by: SPECIALIST

## 2022-08-10 PROCEDURE — 99203 PR OFFICE/OUTPT VISIT, NEW, LEVL III, 30-44 MIN: ICD-10-PCS | Mod: 25,,, | Performed by: SPECIALIST

## 2022-08-10 PROCEDURE — 73030 XR SHOULDER COMPLETE 2 OR MORE VIEWS LEFT: ICD-10-PCS | Mod: LT,,, | Performed by: SPECIALIST

## 2022-08-10 RX ORDER — BETAMETHASONE SODIUM PHOSPHATE AND BETAMETHASONE ACETATE 3; 3 MG/ML; MG/ML
12 INJECTION, SUSPENSION INTRA-ARTICULAR; INTRALESIONAL; INTRAMUSCULAR; SOFT TISSUE
Status: DISCONTINUED | OUTPATIENT
Start: 2022-08-10 | End: 2023-02-07

## 2022-08-10 RX ORDER — LIDOCAINE HYDROCHLORIDE 20 MG/ML
5 INJECTION, SOLUTION EPIDURAL; INFILTRATION; INTRACAUDAL; PERINEURAL
Status: DISCONTINUED | OUTPATIENT
Start: 2022-08-10 | End: 2023-02-07

## 2022-08-10 RX ADMIN — BETAMETHASONE SODIUM PHOSPHATE AND BETAMETHASONE ACETATE 12 MG: 3; 3 INJECTION, SUSPENSION INTRA-ARTICULAR; INTRALESIONAL; INTRAMUSCULAR; SOFT TISSUE at 09:08

## 2022-08-10 RX ADMIN — LIDOCAINE HYDROCHLORIDE 5 ML: 20 INJECTION, SOLUTION EPIDURAL; INFILTRATION; INTRACAUDAL; PERINEURAL at 09:08

## 2022-08-10 NOTE — PROGRESS NOTES
Chief Complaint:   Chief Complaint   Patient presents with    Shoulder Pain     LT shoulder pain- Pt states she is feeling a lot of pain in the shoulder when she tries to move or grab something. Feels like more like a punch. Tried frankincense, salonpas and Tylenol  mg.         History of present illness:    This is a 72 y.o. year old female who complains of pain in her left shoulder and hitting having had a fall she states that she lost her balance fell hit a wall on the left side since that time she is able to fully lift the arm in the past patient has had some mild bursitis pain in the shoulder but states the pain is turned she is able to fully pull arm up she has pain when she lays on her shoulder at night    Past Medical History:   Diagnosis Date    Anxiety     Depression     Diabetes mellitus, type 2     Fibromyalgia     Gastric ulcer     GERD (gastroesophageal reflux disease)     Hyperlipidemia     Hypertension     Hypothyroidism     Nephrolithiasis     Onychomycosis     Osteoporosis     Stage 3b chronic kidney disease        Past Surgical History:   Procedure Laterality Date    APPENDECTOMY      CHOLECYSTECTOMY      COLONOSCOPY  01/16/2015    Rakesh Henriquez MD    HYSTERECTOMY      MASTECTOMY Right     rt kidney stent      UPPER GASTROINTESTINAL ENDOSCOPY  07/12/2022       Current Outpatient Medications   Medication Instructions    benzonatate (TESSALON) 100 mg, Oral, Daily    busPIRone (BUSPAR) 5 mg, Oral, 2 times daily    cetirizine (ZYRTEC) 5 mg, Oral, Daily PRN    citalopram (CELEXA) 40 mg, Oral, Daily    clonazePAM (KLONOPIN) 0.5 mg, Oral, Daily    cyanocobalamin, vitamin B-12, 1,000 mcg Subl Oral    famotidine (PEPCID) 20 mg, Oral, Daily PRN    fluticasone propionate (FLONASE) 50 mcg/actuation nasal spray USE 1 SPRAY IN EACH NOSTRIL ONCE DAILY FOR 14 DAYS    "[employer] PAULO 14 DAY SENSOR Kit USE TO MONITOR BLOOD SUGAR (DX E11.9)    glimepiride (AMARYL) 1 mg,  "Oral, Daily    mupirocin (BACTROBAN) 2 % ointment APPLY THIN FILM TO AFFECTED AREA THREE TIMES DAILY    pantoprazole (PROTONIX) 40 mg, Oral, Daily    predniSONE (DELTASONE) 20 mg, Oral, Daily    SYNTHROID 75 mcg, Oral, Daily    traZODone (DESYREL) 100 mg, Oral    valsartan-hydrochlorothiazide (DIOVAN-HCT) 320-25 mg per tablet 1 tablet, Oral, Daily    WELLBUTRIN  mg, Oral, Daily        Review of patient's allergies indicates:   Allergen Reactions    Hydrocodone bitartrate      Other reaction(s): GI Intolerance  Other reaction(s): GI Intolerance  Acetaminophen hydrocodone bitartrate  Acetaminophen hydrocodone bitartrate      Sulfamethoxazole-trimethoprim      Other reaction(s): stomach pain  Other reaction(s): stomach pain      Hydrocodone-acetaminophen Nausea And Vomiting     Other reaction(s): Visual hallucinations  Other reaction(s): Visual hallucinations      Meperidine Nausea And Vomiting     Other reaction(s): GI Intolerance, Visual hallucinations  Other reaction(s): GI Intolerance, Visual hallucinations      Morphine Palpitations     Other reaction(s): chest tightness  Other reaction(s): chest tightness         No family history on file.        Review of Systems:    Constitution:   Denies chills, fever, and sweats.  HENT:   Denies headaches or blurry vision.  Cardiovascular:  Denies chest pain or irregular heart beat.  Respiratory:   Denies cough or shortness of breath.  Gastrointestinal:  Denies abdominal pain, nausea, or vomiting.  Musculoskeletal:   Denies muscle cramps.  Neurological:   Denies dizziness or focal weakness.  Psychiatric/Behavior: Normal mental status.  Hematology/Lymph:  Denies bleeding problem or easy bruising/bleeding.  Skin:    Denies rash or suspicious lesions.    Examination:    Vital Signs:    Vitals:    08/10/22 0917 08/10/22 0922   BP: 108/66    Pulse: 89    Weight: 74 kg (163 lb 3.2 oz)    Height: 5' 4" (1.626 m)    PainSc:    7       Body mass index is 28.01 " kg/m².    Constitution:   Well-developed, well nourished patient in no acute distress.  Neurological:   Alert and oriented x 3 and cooperative to examination.     Psychiatric/Behavior: Normal mental status.  Respiratory:   No shortness of breath.  Eyes:    Extraoccular muscles intact  Skin:    No scars, rash or suspicious lesions.    Musculoskeletal Exam :   PHYSICAL FINDING      Neck:    NO Pain radiating to the shoulder Precervical         Shoulder:   .   ° NO atrophy of the deltoid muscle   ° NO atrophy of the supraspinatus muscle   ° NO atrophy of the infraspinatus muscle         Left Shoulder: .  Acromial tenderness on palpation.  Tenderness on palpation of the subacromial bursa.  Tenderness on palpation of the deltoid muscle.  Tenderness on palpation of the supraspinatus muscle.  Tenderness on palpation of the infraspinatus muscle.  Tenderness on palpation of the glenohumeral joint region.  Tenderness on palpation at the bicipital groove.  Tenderness on palpation of the trapezius muscle.  Subacromial crepitus on motion was noted.       Right Shoulder:  Normal range and strength.       ° NO swelling medial sternoclavicular.   ° NO swelling.   ° NO induration.   ° NO erythema.   ° NO long head biceps deformity.   ° NO winged scapula.   ° Motion was normal.   ° NO pain was elicited during a Neer impingement test.   ° NO pain was elicited during a Brown-Junior impingement test.      Left Shoulder:      Shoulder Motion:  Abnormal Range      Active forward flexion    80 degrees   Active external rotation  30 degrees   Active internal rotation    10 degrees       Clavicle:      General/bilateral: ° Acromioclavicular joint showed NO pain elicited by motion distal.      Right Clavicle:  Right sternoclavicular joint showed tenderness on palpation.  Right acromioclavicular joint showed tenderness on palpation.      Left Clavicle:  Left sternoclavicular joint showed tenderness on palpation.  Left  acromioclavicular joint showed tenderness on palpation.         Neurological:       NO abnormalities were noted Sensibility intact distally on right.   ° Oriented to time, place, and person.      Sensation:   ° NO sensory exam abnormalities were noted Decreased median sensation.   ° NO sensory exam abnormalities were noted Decreased ulnar sensation.   ° NO sensory exam abnormalities were noted Decreased radial sensation.      Motor (Strength):   ° NO weakness of the right shoulder was observed.   °  weakness of the left shoulder was observed.      Skin:   ° NO ecchymosis on the shoulders left.   ° NO ecchymosis on the shoulders right.      Injury / Incision Site: ° NO scar.      TESTS      Imaging:      X-Ray Shoulder: Complete, two or more views of the true AP of the glenohumeral joint were performed    NO radiographic evidence of any osteoarticular abnormality     Three views of the left shoulder were taken there were no significant acute changes such as fractures shoulder was well located the humerus did not show any signs of fracture.    Assessment: Left shoulder pain, unspecified chronicity  -     X-Ray Shoulder 2 or More Views Left; Future; Expected date: 08/10/2022    Acute pain of left shoulder  -     X-Ray Shoulder 2 or More Views Left; Future; Expected date: 08/10/2022  -     Ambulatory referral/consult to Orthopedics    Nontraumatic complete tear of left rotator cuff        Plan:  physical therapy and return in 1 month if her pain persists      DISCLAIMER: This note may have been dictated using voice recognition software and may contain grammatical errors.     NOTE: Consult report sent to referring provider via [employer].

## 2022-08-10 NOTE — PROCEDURES
Large Joint Aspiration/Injection: L subacromial bursa    Date/Time: 8/10/2022 9:15 AM  Performed by: Layton Fraser MD  Authorized by: Layton Fraser MD     Consent Done?:  Yes (Verbal)  Indications:  Pain  Timeout: prior to procedure the correct patient, procedure, and site was verified      Local anesthesia used?: Yes    Anesthesia:  Local infiltration  Local anesthetic:  Lidocaine 2% without epinephrine    Details:  Needle Size:  25 G  Ultrasonic Guidance for needle placement?: No    Approach:  Posterior  Location:  Shoulder  Site:  L subacromial bursa  Medications:  5 mL LIDOcaine (PF) 20 mg/mL (2%) 20 mg/mL (2 %); 12 mg betamethasone acetate-betamethasone sodium phosphate 6 mg/mL  Patient tolerance:  Patient tolerated the procedure well with no immediate complications

## 2022-08-17 ENCOUNTER — TELEPHONE (OUTPATIENT)
Dept: FAMILY MEDICINE | Facility: CLINIC | Age: 72
End: 2022-08-17
Payer: MEDICARE

## 2022-08-26 PROBLEM — E11.59 HYPERTENSION ASSOCIATED WITH DIABETES: Status: ACTIVE | Noted: 2022-08-26

## 2022-08-26 PROBLEM — E11.69 HYPERLIPIDEMIA ASSOCIATED WITH TYPE 2 DIABETES MELLITUS: Status: ACTIVE | Noted: 2022-08-26

## 2022-08-26 PROBLEM — E78.5 HYPERLIPIDEMIA ASSOCIATED WITH TYPE 2 DIABETES MELLITUS: Status: ACTIVE | Noted: 2022-08-26

## 2022-08-26 PROBLEM — Z85.828 HISTORY OF SKIN CANCER: Status: ACTIVE | Noted: 2022-08-26

## 2022-08-26 PROBLEM — I15.2 HYPERTENSION ASSOCIATED WITH DIABETES: Status: ACTIVE | Noted: 2022-08-26

## 2022-08-26 PROBLEM — Z78.0 POSTMENOPAUSAL: Status: ACTIVE | Noted: 2022-08-26

## 2022-08-29 PROBLEM — Z00.00 WELLNESS EXAMINATION: Status: RESOLVED | Noted: 2022-05-26 | Resolved: 2022-08-29

## 2022-09-07 LAB
LEFT EYE DM RETINOPATHY: NEGATIVE
RIGHT EYE DM RETINOPATHY: NEGATIVE

## 2022-09-12 ENCOUNTER — OFFICE VISIT (OUTPATIENT)
Dept: FAMILY MEDICINE | Facility: CLINIC | Age: 72
End: 2022-09-12
Payer: MEDICARE

## 2022-09-12 DIAGNOSIS — J06.9 UPPER RESPIRATORY TRACT INFECTION, UNSPECIFIED TYPE: Primary | ICD-10-CM

## 2022-09-12 PROCEDURE — 99214 PR OFFICE/OUTPT VISIT, EST, LEVL IV, 30-39 MIN: ICD-10-PCS | Mod: 95,,, | Performed by: NURSE PRACTITIONER

## 2022-09-12 PROCEDURE — 99214 OFFICE O/P EST MOD 30 MIN: CPT | Mod: 95,,, | Performed by: NURSE PRACTITIONER

## 2022-09-12 RX ORDER — BENZONATATE 100 MG/1
100 CAPSULE ORAL 3 TIMES DAILY PRN
Qty: 20 CAPSULE | Refills: 0 | Status: SHIPPED | OUTPATIENT
Start: 2022-09-12 | End: 2022-09-12 | Stop reason: SDUPTHER

## 2022-09-12 RX ORDER — ATORVASTATIN CALCIUM 20 MG/1
20 TABLET, FILM COATED ORAL DAILY
COMMUNITY
Start: 2022-07-03 | End: 2022-09-12

## 2022-09-12 RX ORDER — PRAVASTATIN SODIUM 40 MG/1
40 TABLET ORAL DAILY
COMMUNITY
Start: 2022-07-11 | End: 2022-10-26

## 2022-09-12 RX ORDER — AZITHROMYCIN 250 MG/1
TABLET, FILM COATED ORAL
Qty: 6 TABLET | Refills: 0 | Status: SHIPPED | OUTPATIENT
Start: 2022-09-12 | End: 2022-09-17

## 2022-09-12 RX ORDER — VALACYCLOVIR HYDROCHLORIDE 1 G/1
TABLET, FILM COATED ORAL
COMMUNITY
Start: 2022-07-15

## 2022-09-12 RX ORDER — AZITHROMYCIN 250 MG/1
TABLET, FILM COATED ORAL
Qty: 6 TABLET | Refills: 0 | Status: SHIPPED | OUTPATIENT
Start: 2022-09-12 | End: 2022-09-12 | Stop reason: SDUPTHER

## 2022-09-12 RX ORDER — BENZONATATE 100 MG/1
100 CAPSULE ORAL 3 TIMES DAILY PRN
Qty: 20 CAPSULE | Refills: 0 | Status: SHIPPED | OUTPATIENT
Start: 2022-09-12 | End: 2022-09-22

## 2022-09-12 NOTE — PROGRESS NOTES
Subjective:      Patient ID: Claire Ang is a 72 y.o. White female      Chief Complaint: Sinus Problem       Past Medical History:   Diagnosis Date    Anxiety     Fibromyalgia     Gastric ulcer     Hypothyroidism     Nephrolithiasis     Onychomycosis     Osteoporosis     Stage 3b chronic kidney disease         HPI  Cough  This is a new problem. The current episode started yesterday. The cough is Non-productive. Associated symptoms include ear pain, a fever (101), headaches, rhinorrhea and a sore throat. Pertinent negatives include no shortness of breath or weight loss. She has tried OTC cough suppressant (Zyrtec) for the symptoms.   States negative Covid-19 this morning.  She is requesting a Z-adam.  Denies any other problems.      Review of Systems   Constitutional:  Positive for fever (101). Negative for weight loss.   HENT:  Positive for ear pain, rhinorrhea and sore throat.    Respiratory:  Positive for cough. Negative for shortness of breath.    Neurological:  Positive for headaches.        Objective:      There were no vitals filed for this visit.   There is no height or weight on file to calculate BMI.     Physical Exam       Current Outpatient Medications:     cetirizine (ZYRTEC) 5 MG tablet, Take 5 mg by mouth daily as needed., Disp: , Rfl:     cyanocobalamin, vitamin B-12, 1,000 mcg Subl, Take by mouth once daily., Disp: , Rfl:     famotidine (PEPCID) 20 MG tablet, Take 20 mg by mouth daily as needed., Disp: , Rfl:     FREESTYLE PAULO 14 DAY SENSOR Kit, USE TO MONITOR BLOOD SUGAR (DX E11.9), Disp: 2 kit, Rfl: 11    mupirocin (BACTROBAN) 2 % ointment, APPLY THIN FILM TO AFFECTED AREA THREE TIMES DAILY, Disp: , Rfl:     pravastatin (PRAVACHOL) 40 MG tablet, Take 40 mg by mouth once daily., Disp: , Rfl:     SYNTHROID 75 mcg tablet, Take 75 mcg by mouth once daily., Disp: , Rfl:     valACYclovir (VALTREX) 1000 MG tablet, Take by mouth as needed., Disp: , Rfl:     valsartan-hydrochlorothiazide  (DIOVAN-HCT) 320-25 mg per tablet, Take 1 tablet by mouth once daily., Disp: , Rfl:     WELLBUTRIN  mg SR12, Take 200 mg by mouth Daily., Disp: , Rfl:     glimepiride (AMARYL) 2 MG tablet, Take 0.5 tablets (1 mg total) by mouth once daily. (Patient taking differently: Take 2 mg by mouth once daily.), Disp: 15 tablet, Rfl: 3  No current facility-administered medications for this visit.    Facility-Administered Medications Ordered in Other Visits:     betamethasone acetate-betamethasone sodium phosphate injection 12 mg, 12 mg, Intramuscular, , Layton Fraser MD, 12 mg at 08/10/22 0915    LIDOcaine (PF) 20 mg/mL (2%) injection 5 mL, 5 mL, , , Layton Fraser MD, 5 mL at 08/10/22 0915    Assessment & Plan:     Problem List Items Addressed This Visit          ENT    Upper respiratory tract infection - Primary     Will repeat Covid-19 testing  If negative, will give Rx for Z-adam and anti-tussive  Informed patient that negative COVID-19 does not rule out COVID; and testing may be too soon  Informed patient to repeat COVID test at any point if no improvement in symptoms for appropriate medication  Instructed to continue to monitor symptoms and to report to ED for any CP, SOB, and/or worsening symptoms  Patient is agreeable to plan and verbalizes understanding                 Patient repeated Covid-19 testing again and called with negative results.    Please see assessment/plan

## 2022-09-12 NOTE — ASSESSMENT & PLAN NOTE
Will repeat Covid-19 testing  If negative, will give Rx for Z-adam and anti-tussive  Informed patient that negative COVID-19 does not rule out COVID; and testing may be too soon  Informed patient to repeat COVID test at any point if no improvement in symptoms for appropriate medication  Instructed to continue to monitor symptoms and to report to ED for any CP, SOB, and/or worsening symptoms  Patient is agreeable to plan and verbalizes understanding

## 2022-09-15 ENCOUNTER — TELEPHONE (OUTPATIENT)
Dept: FAMILY MEDICINE | Facility: CLINIC | Age: 72
End: 2022-09-15
Payer: MEDICARE

## 2022-09-15 RX ORDER — AZITHROMYCIN 250 MG/1
TABLET, FILM COATED ORAL
Qty: 12 TABLET | Refills: 0 | Status: SHIPPED | OUTPATIENT
Start: 2022-09-15 | End: 2022-09-20

## 2022-09-15 NOTE — TELEPHONE ENCOUNTER
----- Message from Saida Mahmood sent at 9/15/2022  9:49 AM CDT -----  Regarding: Med Advice  .Type:  Needs Medical Advice    Who Called: Pt  Symptoms (please be specific): Green discharge form nose, head congestion   How long has patient had these symptoms:  1 week  Pharmacy name and phone #:  [facility]  Would the patient rather a call back or a response via [facility]? Call back  Best Call Back Number: [phone]  Additional Information:  Pt was seen virtually for sinus infection and pt states that she's taken all the meds and not feeling any better, wants to know if she can get a stronger dosage of azithromycin (Z-SKY) 250 MG tablet..

## 2022-09-15 NOTE — TELEPHONE ENCOUNTER
----- Message from Alyse Alex sent at 9/15/2022 11:28 AM CDT -----  Regarding: call back  .Type:  Needs Medical Advice    Who Called: pt   Symptoms (please be specific):    How long has patient had these symptoms:    Pharmacy name and phone #:    Would the patient rather a call back or a response via [facility]? Call back   Best Call Back Number: [phone]  Additional Information: pt wants to know if she can get her azithromycin (Z-SKY) 250 MG tablet upped to 500 mg

## 2022-09-15 NOTE — TELEPHONE ENCOUNTER
I have signed for the following  meds.  Please call the patient to inform about any medications that were sent.            Medications Ordered This Encounter   Medications    azithromycin (Z-SKY) 250 MG tablet     Sig: Take 2 tablets by mouth on day 1; Take 1 tablet by mouth on days 2-5     Dispense:  12 tablet     Refill:  0

## 2022-09-15 NOTE — TELEPHONE ENCOUNTER
Pt states she had virtual on Monday with Elaine, pt would like a stronger Z-pack sent in to pharmacy pt asked if she can have two Z-packs sent in due to her leaving the country on Friday, [facility].

## 2022-09-22 ENCOUNTER — HISTORICAL (OUTPATIENT)
Dept: ADMINISTRATIVE | Facility: HOSPITAL | Age: 72
End: 2022-09-22

## 2022-10-26 ENCOUNTER — APPOINTMENT (OUTPATIENT)
Dept: LAB | Facility: HOSPITAL | Age: 72
End: 2022-10-26
Attending: NURSE PRACTITIONER
Payer: MEDICARE

## 2022-10-26 ENCOUNTER — OFFICE VISIT (OUTPATIENT)
Dept: FAMILY MEDICINE | Facility: CLINIC | Age: 72
End: 2022-10-26
Payer: MEDICARE

## 2022-10-26 VITALS
BODY MASS INDEX: 28.25 KG/M2 | DIASTOLIC BLOOD PRESSURE: 75 MMHG | HEART RATE: 81 BPM | RESPIRATION RATE: 18 BRPM | OXYGEN SATURATION: 95 % | SYSTOLIC BLOOD PRESSURE: 119 MMHG | TEMPERATURE: 99 F | HEIGHT: 64 IN | WEIGHT: 165.5 LBS

## 2022-10-26 DIAGNOSIS — E11.69 HYPERLIPIDEMIA ASSOCIATED WITH TYPE 2 DIABETES MELLITUS: ICD-10-CM

## 2022-10-26 DIAGNOSIS — E11.59 HYPERTENSION ASSOCIATED WITH DIABETES: ICD-10-CM

## 2022-10-26 DIAGNOSIS — H60.501 ACUTE OTITIS EXTERNA OF RIGHT EAR, UNSPECIFIED TYPE: Primary | ICD-10-CM

## 2022-10-26 DIAGNOSIS — R30.0 DYSURIA: ICD-10-CM

## 2022-10-26 DIAGNOSIS — E78.5 HYPERLIPIDEMIA ASSOCIATED WITH TYPE 2 DIABETES MELLITUS: ICD-10-CM

## 2022-10-26 DIAGNOSIS — N30.20 CHRONIC CYSTITIS: ICD-10-CM

## 2022-10-26 DIAGNOSIS — I15.2 HYPERTENSION ASSOCIATED WITH DIABETES: ICD-10-CM

## 2022-10-26 DIAGNOSIS — E11.65 TYPE 2 DIABETES MELLITUS WITH HYPERGLYCEMIA, WITHOUT LONG-TERM CURRENT USE OF INSULIN: ICD-10-CM

## 2022-10-26 PROBLEM — E11.649 HYPOGLYCEMIA ASSOCIATED WITH TYPE 2 DIABETES MELLITUS: Status: RESOLVED | Noted: 2022-07-13 | Resolved: 2022-10-26

## 2022-10-26 LAB
APPEARANCE UR: CLEAR
BACTERIA #/AREA URNS AUTO: NORMAL /HPF
BILIRUB UR QL STRIP.AUTO: NEGATIVE MG/DL
COLOR UR AUTO: ABNORMAL
GLUCOSE UR QL STRIP.AUTO: NEGATIVE MG/DL
KETONES UR QL STRIP.AUTO: NEGATIVE MG/DL
LEUKOCYTE ESTERASE UR QL STRIP.AUTO: ABNORMAL UNIT/L
NITRITE UR QL STRIP.AUTO: NEGATIVE
PH UR STRIP.AUTO: 7.5 [PH]
PROT UR QL STRIP.AUTO: NEGATIVE MG/DL
RBC #/AREA URNS AUTO: NORMAL /HPF
RBC UR QL AUTO: ABNORMAL UNIT/L
SP GR UR STRIP.AUTO: 1.02
SQUAMOUS #/AREA URNS AUTO: NORMAL /HPF
UROBILINOGEN UR STRIP-ACNC: 0.2 MG/DL
WBC #/AREA URNS AUTO: NORMAL /HPF

## 2022-10-26 PROCEDURE — 99215 PR OFFICE/OUTPT VISIT, EST, LEVL V, 40-54 MIN: ICD-10-PCS | Mod: ,,, | Performed by: NURSE PRACTITIONER

## 2022-10-26 PROCEDURE — 99215 OFFICE O/P EST HI 40 MIN: CPT | Mod: ,,, | Performed by: NURSE PRACTITIONER

## 2022-10-26 RX ORDER — ACETIC ACID 20.65 MG/ML
4 SOLUTION AURICULAR (OTIC) 3 TIMES DAILY
Qty: 6 ML | Refills: 0 | Status: SHIPPED | OUTPATIENT
Start: 2022-10-26 | End: 2022-11-05

## 2022-10-26 RX ORDER — LEVOTHYROXINE SODIUM 75 UG/1
75 TABLET ORAL DAILY
Qty: 90 TABLET | Refills: 2 | Status: SHIPPED | OUTPATIENT
Start: 2022-10-26 | End: 2023-04-10 | Stop reason: SDUPTHER

## 2022-10-26 RX ORDER — AMOXICILLIN AND CLAVULANATE POTASSIUM 875; 125 MG/1; MG/1
1 TABLET, FILM COATED ORAL 2 TIMES DAILY
Qty: 20 TABLET | Refills: 0 | Status: SHIPPED | OUTPATIENT
Start: 2022-10-26 | End: 2022-11-05

## 2022-10-26 RX ORDER — PANTOPRAZOLE SODIUM 40 MG/1
40 TABLET, DELAYED RELEASE ORAL EVERY MORNING
COMMUNITY
Start: 2022-10-20

## 2022-10-26 RX ORDER — CLONAZEPAM 0.5 MG/1
0.5 TABLET ORAL DAILY PRN
COMMUNITY
Start: 2022-10-19 | End: 2023-02-07

## 2022-10-26 NOTE — ASSESSMENT & PLAN NOTE
Hx frequent UTI  Will obtain U/A and culture; and start antibiotic pending results  *Of note; patient will be going on cruise on Friday*  If +, will refer to Urology  Report to ED for any flank pain, fever, chills, and/or worsening symptoms  Denies any other problems

## 2022-10-26 NOTE — PROGRESS NOTES
Subjective:      Patient ID: Claire Ang is a 72 y.o. White female      Chief Complaint: Sore Throat (ear) and Otalgia       Past Medical History:   Diagnosis Date    Anxiety     Breast cancer     Diabetes mellitus     Fibromyalgia     Gastric ulcer     Gastric ulcer     Hypertension     Hypothyroidism     Nephrolithiasis     Onychomycosis     Osteoporosis     Stage 3b chronic kidney disease         HPI  Presents to clinic for 3-month follow up and evaluation of ear pain/dysuria.    C/O of right ear pain x 4 days.  Associated with sore throat and swollen glands.  Denies any nasal congestion, rhinorrhea, fever, chills, body aches, wheezing, SOB, or CP.      C/O of dysuria for a few days.  Associated with dark urine.  Denies any flank pain, frequency, urgency, nausea, vomiting, fever, or chills.  Hx of chronic cystitis.  Saw Urology many years ago; unsure of reason.     Gastric Ulcers: Recent EGD per Dr. Peng.  States finding of ulcerated esophagus and ulcers in stomach.      DM: States CBG's 100's fasting.  Denies any hypoglycemia.  Her last A1c was 7.2 (6/2022).  Ophthalmologist is Dr. Stefanie Zapien.  Previously taking Metformin but d/c'd due to elevated creatinine.  Previously taking Glipizide, but d/c'd due to nausea.  Currently taking Glimepiride 2 mg po daily.  DM eye exam 2022 with Dr. Zapien; Results requested      HTN/HLD.  BP at goal.  States compliance with medication.  Her cardiologist is Dr. Daigle.  She is not taking statin due to knee pain.     Hypothyroidism:  Takes Synthroid name brand.     Anxiety/Depression: and follows with Psychiatry, Dr. Aguirre.  She is on Wellbutrin and BuSpar.  She states she filed for divorce from her      Hx Breast Cancer:  Rt mastectomy.  She is followed by Dr. Tsang.  She is on tamoxifen.  She wears a prosthetic. Last mammogram 7/2022 was normal. She has had a  Complete hysterectomy at age 37 her heavy menses and therefore no longer does Pap  smears     Osteoporosis:  On Prolia infusions q.6 months.  She also takes calcium and vitamin-D.  Her last bone density test was  August 2021     Hx Skin cancer.  Her dermatologist is Dr. Trujillo    EGD/Colonoscopy      Review of Systems   Constitutional: Negative.  Negative for appetite change, chills and fever.   HENT:  Positive for ear pain and sore throat. Negative for nasal congestion, rhinorrhea, sneezing and trouble swallowing.    Eyes: Negative.  Negative for discharge and redness.   Respiratory: Negative.  Negative for shortness of breath and wheezing.    Cardiovascular: Negative.  Negative for chest pain.   Gastrointestinal: Negative.    Endocrine: Negative.    Genitourinary: Negative.    Integumentary:  Negative.   Allergic/Immunologic: Negative.    Neurological: Negative.    Psychiatric/Behavioral: Negative.     All other systems reviewed and are negative.       Objective:      Vitals:    10/26/22 0930   BP: 119/75   Pulse: 81   Resp: 18   Temp: 98.7 °F (37.1 °C)      Body mass index is 28.41 kg/m².     Physical Exam  Vitals reviewed.   Constitutional:       Appearance: Normal appearance.   HENT:      Head: Normocephalic.      Right Ear: Tympanic membrane normal. Tenderness present.      Left Ear: Tympanic membrane and ear canal normal.      Ears:      Comments: Pain with movement of right tragus  Ear canal noted with edema/exudate  Right TM normal     Mouth/Throat:      Mouth: Mucous membranes are moist.   Eyes:      Conjunctiva/sclera: Conjunctivae normal.      Pupils: Pupils are equal, round, and reactive to light.   Neck:      Comments: Right Cervical LAD  Cardiovascular:      Rate and Rhythm: Normal rate and regular rhythm.   Pulmonary:      Effort: Pulmonary effort is normal. No respiratory distress.      Breath sounds: Normal breath sounds.   Musculoskeletal:         General: Normal range of motion.      Cervical back: Normal range of motion and neck supple.   Skin:     General: Skin is warm and  dry.   Neurological:      Mental Status: She is alert and oriented to person, place, and time.   Psychiatric:         Mood and Affect: Mood normal.          Current Outpatient Medications:     cetirizine (ZYRTEC) 5 MG tablet, Take 5 mg by mouth daily as needed., Disp: , Rfl:     clonazePAM (KLONOPIN) 0.5 MG tablet, Take 0.5 mg by mouth daily as needed., Disp: , Rfl:     cyanocobalamin, vitamin B-12, 1,000 mcg Subl, Take by mouth once daily., Disp: , Rfl:     famotidine (PEPCID) 20 MG tablet, Take 20 mg by mouth nightly as needed., Disp: , Rfl:     [employer] PAULO 14 DAY SENSOR Kit, USE TO MONITOR BLOOD SUGAR (DX E11.9), Disp: 2 kit, Rfl: 11    mupirocin (BACTROBAN) 2 % ointment, APPLY THIN FILM TO AFFECTED AREA THREE TIMES DAILY, Disp: , Rfl:     pantoprazole (PROTONIX) 40 MG tablet, Take 40 mg by mouth every morning., Disp: , Rfl:     valACYclovir (VALTREX) 1000 MG tablet, Take by mouth as needed., Disp: , Rfl:     valsartan-hydrochlorothiazide (DIOVAN-HCT) 320-25 mg per tablet, Take 1 tablet by mouth once daily., Disp: , Rfl:     WELLBUTRIN  mg SR12, Take 200 mg by mouth Daily., Disp: , Rfl:     acetic acid (VOSOL) 2 % otic solution, Place 4 drops into the right ear 3 (three) times daily. for 10 days, Disp: 6 mL, Rfl: 0    amoxicillin-clavulanate 875-125mg (AUGMENTIN) 875-125 mg per tablet, Take 1 tablet by mouth 2 (two) times daily. for 10 days, Disp: 20 tablet, Rfl: 0    glimepiride (AMARYL) 2 MG tablet, Take 0.5 tablets (1 mg total) by mouth once daily. (Patient taking differently: Take 2 mg by mouth once daily.), Disp: 15 tablet, Rfl: 3    SYNTHROID 75 mcg tablet, Take 1 tablet (75 mcg total) by mouth once daily., Disp: 90 tablet, Rfl: 2  No current facility-administered medications for this visit.    Facility-Administered Medications Ordered in Other Visits:     betamethasone acetate-betamethasone sodium phosphate injection 12 mg, 12 mg, Intramuscular, , Layton Fraser MD, 12 mg at  08/10/22 0915    LIDOcaine (PF) 20 mg/mL (2%) injection 5 mL, 5 mL, , , Layton Fraser MD, 5 mL at 08/10/22 0915    Assessment & Plan:     Problem List Items Addressed This Visit          ENT    Acute otitis externa of right ear - Primary     Rx Augmentin (systemic antibiotics due to LAD)  Rx Acetic Acid Otic  Monitor symptoms, f/u if no improvement  Report to ED for any fever, chills, and/or worsening symptoms  Verbalizes understanding         Relevant Medications    amoxicillin-clavulanate 875-125mg (AUGMENTIN) 875-125 mg per tablet    acetic acid (VOSOL) 2 % otic solution       Cardiac/Vascular    Hyperlipidemia associated with type 2 diabetes mellitus     Stable  Statin Intolerant         Hypertension associated with diabetes     BP at goal  Continue current meds            Renal/    Dysuria     Hx frequent UTI  Will obtain U/A and culture; and start antibiotic pending results  *Of note; patient will be going on cruise on Friday*  If +, will refer to Urology  Report to ED for any flank pain, fever, chills, and/or worsening symptoms  Denies any other problems               Relevant Orders    Urinalysis (Completed)    Urine culture    Urinalysis, Microscopic (Completed)    Chronic cystitis     Hx frequent UTI  Will obtain U/A and culture; and start antibiotic pending results  *Of note; patient will be going on cruise on Friday*  If +, will refer to Urology  Report to ED for any flank pain, fever, chills, and/or worsening symptoms  Denies any other problems              Endocrine    Diabetes mellitus     's; Denies any Hypoglycemia  DM eye exam 2022 with Dr. Zapien; Results requested  Continue Glimepiride as prescribed                 Prior to the patient's arrival on the same day, I spent (3) minutes reviewing chart. Once in the exam room with the patient, I spent (17) minutes in the room with the member performing a history and exam as well as reviewing the test results and recommendations with the  patient. After leaving the exam room, I spent an additional (20 ) minutes completing the electronic health record. The total time spent that day caring for the member is (40) minutes, and this time - including the breakdown - is documented in the medical record.

## 2022-10-26 NOTE — ASSESSMENT & PLAN NOTE
Rx Augmentin (systemic antibiotics due to LAD)  Rx Acetic Acid Otic  Monitor symptoms, f/u if no improvement  Report to ED for any fever, chills, and/or worsening symptoms  Verbalizes understanding

## 2022-11-09 ENCOUNTER — TELEPHONE (OUTPATIENT)
Dept: PRIMARY CARE CLINIC | Facility: CLINIC | Age: 72
End: 2022-11-09
Payer: MEDICARE

## 2022-11-11 ENCOUNTER — DOCUMENTATION ONLY (OUTPATIENT)
Dept: ADMINISTRATIVE | Facility: HOSPITAL | Age: 72
End: 2022-11-11
Payer: MEDICARE

## 2022-12-02 ENCOUNTER — HOSPITAL ENCOUNTER (EMERGENCY)
Facility: HOSPITAL | Age: 72
Discharge: HOME | End: 2022-12-02
Attending: EMERGENCY MEDICINE
Payer: MEDICARE

## 2022-12-02 VITALS
WEIGHT: 162 LBS | SYSTOLIC BLOOD PRESSURE: 127 MMHG | TEMPERATURE: 98 F | OXYGEN SATURATION: 95 % | HEART RATE: 79 BPM | RESPIRATION RATE: 16 BRPM | BODY MASS INDEX: 27.66 KG/M2 | DIASTOLIC BLOOD PRESSURE: 72 MMHG | HEIGHT: 64 IN

## 2022-12-02 DIAGNOSIS — M54.2 NECK PAIN: Primary | ICD-10-CM

## 2022-12-02 LAB
ALBUMIN SERPL-MCNC: 3.5 GM/DL (ref 3.4–4.8)
ALBUMIN/GLOB SERPL: 1.2 RATIO (ref 1.1–2)
ALP SERPL-CCNC: 73 UNIT/L (ref 40–150)
ALT SERPL-CCNC: 16 UNIT/L (ref 0–55)
AST SERPL-CCNC: 15 UNIT/L (ref 5–34)
BASOPHILS # BLD AUTO: 0.04 X10(3)/MCL (ref 0–0.2)
BASOPHILS NFR BLD AUTO: 0.5 %
BILIRUBIN DIRECT+TOT PNL SERPL-MCNC: 0.6 MG/DL
BUN SERPL-MCNC: 26.7 MG/DL (ref 9.8–20.1)
CALCIUM SERPL-MCNC: 9.3 MG/DL (ref 8.4–10.2)
CHLORIDE SERPL-SCNC: 104 MMOL/L (ref 98–107)
CO2 SERPL-SCNC: 27 MMOL/L (ref 23–31)
CREAT SERPL-MCNC: 1.18 MG/DL (ref 0.55–1.02)
EOSINOPHIL # BLD AUTO: 0.18 X10(3)/MCL (ref 0–0.9)
EOSINOPHIL NFR BLD AUTO: 2.4 %
ERYTHROCYTE [DISTWIDTH] IN BLOOD BY AUTOMATED COUNT: 13.4 % (ref 11.5–17)
GFR SERPLBLD CREATININE-BSD FMLA CKD-EPI: 50 MLS/MIN/1.73/M2
GLOBULIN SER-MCNC: 2.9 GM/DL (ref 2.4–3.5)
GLUCOSE SERPL-MCNC: 143 MG/DL (ref 82–115)
HCT VFR BLD AUTO: 33 % (ref 37–47)
HGB BLD-MCNC: 10.6 GM/DL (ref 12–16)
IMM GRANULOCYTES # BLD AUTO: 0.02 X10(3)/MCL (ref 0–0.04)
IMM GRANULOCYTES NFR BLD AUTO: 0.3 %
LYMPHOCYTES # BLD AUTO: 2.57 X10(3)/MCL (ref 0.6–4.6)
LYMPHOCYTES NFR BLD AUTO: 34.5 %
MCH RBC QN AUTO: 30.9 PG (ref 27–31)
MCHC RBC AUTO-ENTMCNC: 32.1 MG/DL (ref 33–36)
MCV RBC AUTO: 96.2 FL (ref 80–94)
MONOCYTES # BLD AUTO: 0.54 X10(3)/MCL (ref 0.1–1.3)
MONOCYTES NFR BLD AUTO: 7.3 %
NEUTROPHILS # BLD AUTO: 4.1 X10(3)/MCL (ref 2.1–9.2)
NEUTROPHILS NFR BLD AUTO: 55 %
NRBC BLD AUTO-RTO: 0 %
PLATELET # BLD AUTO: 198 X10(3)/MCL (ref 130–400)
PMV BLD AUTO: 9.1 FL (ref 7.4–10.4)
POTASSIUM SERPL-SCNC: 4.2 MMOL/L (ref 3.5–5.1)
PROT SERPL-MCNC: 6.4 GM/DL (ref 5.8–7.6)
RBC # BLD AUTO: 3.43 X10(6)/MCL (ref 4.2–5.4)
SODIUM SERPL-SCNC: 139 MMOL/L (ref 136–145)
WBC # SPEC AUTO: 7.4 X10(3)/MCL (ref 4.5–11.5)

## 2022-12-02 PROCEDURE — 96372 THER/PROPH/DIAG INJ SC/IM: CPT | Performed by: NURSE PRACTITIONER

## 2022-12-02 PROCEDURE — 80053 COMPREHEN METABOLIC PANEL: CPT | Performed by: NURSE PRACTITIONER

## 2022-12-02 PROCEDURE — 99285 EMERGENCY DEPT VISIT HI MDM: CPT | Mod: 25

## 2022-12-02 PROCEDURE — 63600175 PHARM REV CODE 636 W HCPCS: Performed by: NURSE PRACTITIONER

## 2022-12-02 PROCEDURE — 25000003 PHARM REV CODE 250: Performed by: NURSE PRACTITIONER

## 2022-12-02 PROCEDURE — 85025 COMPLETE CBC W/AUTO DIFF WBC: CPT | Performed by: NURSE PRACTITIONER

## 2022-12-02 RX ORDER — DEXAMETHASONE SODIUM PHOSPHATE 4 MG/ML
8 INJECTION, SOLUTION INTRA-ARTICULAR; INTRALESIONAL; INTRAMUSCULAR; INTRAVENOUS; SOFT TISSUE
Status: COMPLETED | OUTPATIENT
Start: 2022-12-02 | End: 2022-12-02

## 2022-12-02 RX ORDER — HYDROMORPHONE HYDROCHLORIDE 2 MG/ML
1 INJECTION, SOLUTION INTRAMUSCULAR; INTRAVENOUS; SUBCUTANEOUS
Status: COMPLETED | OUTPATIENT
Start: 2022-12-02 | End: 2022-12-02

## 2022-12-02 RX ORDER — OXYCODONE AND ACETAMINOPHEN 5; 325 MG/1; MG/1
1 TABLET ORAL EVERY 8 HOURS PRN
Qty: 10 TABLET | Refills: 0 | Status: SHIPPED | OUTPATIENT
Start: 2022-12-02 | End: 2023-02-07

## 2022-12-02 RX ORDER — ONDANSETRON 4 MG/1
4 TABLET, ORALLY DISINTEGRATING ORAL EVERY 8 HOURS PRN
Qty: 15 TABLET | Refills: 0 | Status: SHIPPED | OUTPATIENT
Start: 2022-12-02 | End: 2023-02-07

## 2022-12-02 RX ORDER — KETOROLAC TROMETHAMINE 30 MG/ML
30 INJECTION, SOLUTION INTRAMUSCULAR; INTRAVENOUS
Status: COMPLETED | OUTPATIENT
Start: 2022-12-02 | End: 2022-12-02

## 2022-12-02 RX ORDER — ONDANSETRON 4 MG/1
8 TABLET, ORALLY DISINTEGRATING ORAL
Status: COMPLETED | OUTPATIENT
Start: 2022-12-02 | End: 2022-12-02

## 2022-12-02 RX ORDER — METHOCARBAMOL 500 MG/1
1000 TABLET, FILM COATED ORAL
Status: COMPLETED | OUTPATIENT
Start: 2022-12-02 | End: 2022-12-02

## 2022-12-02 RX ORDER — CYCLOBENZAPRINE HCL 10 MG
5 TABLET ORAL 3 TIMES DAILY PRN
Qty: 10 TABLET | Refills: 0 | Status: SHIPPED | OUTPATIENT
Start: 2022-12-02 | End: 2022-12-07

## 2022-12-02 RX ADMIN — METHOCARBAMOL 1000 MG: 500 TABLET ORAL at 11:12

## 2022-12-02 RX ADMIN — HYDROMORPHONE HYDROCHLORIDE 1 MG: 2 INJECTION INTRAMUSCULAR; INTRAVENOUS; SUBCUTANEOUS at 01:12

## 2022-12-02 RX ADMIN — KETOROLAC TROMETHAMINE 30 MG: 30 INJECTION, SOLUTION INTRAMUSCULAR at 11:12

## 2022-12-02 RX ADMIN — ONDANSETRON 8 MG: 4 TABLET, ORALLY DISINTEGRATING ORAL at 03:12

## 2022-12-02 RX ADMIN — DEXAMETHASONE SODIUM PHOSPHATE 8 MG: 4 INJECTION, SOLUTION INTRA-ARTICULAR; INTRALESIONAL; INTRAMUSCULAR; INTRAVENOUS; SOFT TISSUE at 12:12
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
## 2023-01-03 ENCOUNTER — LAB VISIT (OUTPATIENT)
Dept: LAB | Facility: HOSPITAL | Age: 73
End: 2023-01-03
Attending: FAMILY MEDICINE
Payer: COMMERCIAL

## 2023-01-03 ENCOUNTER — TELEPHONE (OUTPATIENT)
Dept: FAMILY MEDICINE | Facility: CLINIC | Age: 73
End: 2023-01-03
Payer: MEDICARE

## 2023-01-03 DIAGNOSIS — Z00.00 WELLNESS EXAMINATION: ICD-10-CM

## 2023-01-03 DIAGNOSIS — E78.5 HYPERLIPIDEMIA, UNSPECIFIED HYPERLIPIDEMIA TYPE: ICD-10-CM

## 2023-01-03 DIAGNOSIS — R73.09 ELEVATED RANDOM BLOOD GLUCOSE LEVEL: ICD-10-CM

## 2023-01-03 DIAGNOSIS — E11.65 TYPE 2 DIABETES MELLITUS WITH HYPERGLYCEMIA, WITHOUT LONG-TERM CURRENT USE OF INSULIN: Primary | ICD-10-CM

## 2023-01-03 DIAGNOSIS — I10 HYPERTENSION, UNSPECIFIED TYPE: ICD-10-CM

## 2023-01-03 DIAGNOSIS — Z00.00 WELLNESS EXAMINATION: Primary | ICD-10-CM

## 2023-01-03 LAB
APPEARANCE UR: ABNORMAL
BACTERIA #/AREA URNS AUTO: ABNORMAL /HPF
BILIRUB UR QL STRIP.AUTO: NEGATIVE MG/DL
COLOR UR AUTO: YELLOW
CREAT UR-MCNC: 143.4 MG/DL (ref 47–110)
GLUCOSE UR QL STRIP.AUTO: NEGATIVE MG/DL
KETONES UR QL STRIP.AUTO: ABNORMAL MG/DL
LEUKOCYTE ESTERASE UR QL STRIP.AUTO: ABNORMAL UNIT/L
MICROALBUMIN UR-MCNC: 16.8 UG/ML
MICROALBUMIN/CREAT RATIO PNL UR: 11.7 MG/GM CR (ref 0–30)
NITRITE UR QL STRIP.AUTO: POSITIVE
PH UR STRIP.AUTO: 6 [PH]
PROT UR QL STRIP.AUTO: NEGATIVE MG/DL
RBC #/AREA URNS AUTO: ABNORMAL /HPF
RBC UR QL AUTO: ABNORMAL UNIT/L
SP GR UR STRIP.AUTO: 1.02
SQUAMOUS #/AREA URNS AUTO: ABNORMAL /HPF
UROBILINOGEN UR STRIP-ACNC: 0.2 MG/DL
WBC #/AREA URNS AUTO: ABNORMAL /HPF
WBC CLUMPS UR QL AUTO: ABNORMAL /HPF

## 2023-01-03 PROCEDURE — 87088 URINE BACTERIA CULTURE: CPT

## 2023-01-03 PROCEDURE — 82043 UR ALBUMIN QUANTITATIVE: CPT

## 2023-01-03 PROCEDURE — 87186 SC STD MICRODIL/AGAR DIL: CPT

## 2023-01-03 PROCEDURE — 81001 URINALYSIS AUTO W/SCOPE: CPT

## 2023-01-03 NOTE — TELEPHONE ENCOUNTER
----- Message from Cristal Perales sent at 1/3/2023  1:43 PM CST -----  Regarding: labs  Caller is requesting to schedule their Lab appointment prior to annual appointment.  Order is not listed in EPIC.  Please enter order and contact patient to schedule.    Name of Caller:pt    Preferred Date and Time of Labs: NOW    Date of EPP Appointment:1/12    Where would they like the lab performed?[facility]    Would the patient rather a call back or a response via [facility]? C/b    Best Call Back Number:[phone]    Additional Information:please contact pt as soon as they are in system she is waiting in parking lot   Abl no answer

## 2023-01-04 NOTE — TELEPHONE ENCOUNTER
----- Message from Alyse Alex sent at 1/4/2023 11:05 AM CST -----  Regarding: med refill  .Type:  RX Refill Request    Who Called: pt   Refill or New Rx:refill   RX Name and Strength:Ph-Z Boric acid vaginal suppository   How is the patient currently taking it? (ex. 1XDay):  Is this a 30 day or 90 day RX:  Preferred Pharmacy with phone number:[email or web address]   Local or Mail Order:local   Ordering Provider:jen   Would the patient rather a call back or a response via [facility]? Call back   Best Call Back Number:[phone]  Additional Information: med not in list     .Type:  RX Refill Request    Who Called: pt   Refill or New Rx:refill   RX Name and Strength:Flexoril    How is the patient currently taking it? (ex. 1XDay):  Is this a 30 day or 90 day RX:  Preferred Pharmacy with phone number:[email or web address]   Local or Mail Order:local   Ordering Provider:jen   Would the patient rather a call back or a response via [facility]? Call back   Best Call Back Number:[phone]  Additional Information: med not in list

## 2023-01-04 NOTE — TELEPHONE ENCOUNTER
"  Original message from call center is asking for flexeril (with no strength or directions) and boric acid suppository.  When raquel called patient back- now message is asking for freestyle catrachita glucose sensor and boric acid suppository        I sent in boric acid suppository rx and glucose sensor.    Messages from call center need to be accurate.               I have signed for the following orders AND/OR meds.  Please call the patient and ask the patient to schedule the testing AND/OR inform about any medications that were sent.      Orders Placed This Encounter   Procedures    Continuous Glucose Monitoring for Home Use (HME vendor/non-pharmacy)     Order Specific Question:   Height:     Answer:   5' 4"     Order Specific Question:   Weight:     Answer:   162 lbs     Order Specific Question:   Length of need (1-99 months):     Answer:   99     Order Specific Question:   Is the patient currently on Insulin Pump Therapy?     Answer:   No     Order Specific Question:   CGM Preferred Brand:     Answer:   FreeStyle Catrachita 2 ()     Order Specific Question:   Supplies needed ():     Answer:    (1)     Order Specific Question:   Supplies needed ():     Answer:   Sensors (2 per month)     Order Specific Question:   Diagnosis Code:     Answer:   Type 2 diabetes mellitus with hyperglycemia [919659]     Order Specific Question:   Clinical Considerations (Check all that apply):     Answer:   Patient self checks BG 4+ times per day     Order Specific Question:   Clinical Considerations (Check all that apply):     Answer:   Within 6 months prior to ordering CGM, patient had in-person visit with treating practitioner to confirm that patient is diabetic and meets a-d above and to evaluate patients diabetes control     Order Specific Question:   Clinical Considerations (Check all that apply):     Answer:   Patients motivated and knowledgeable to use CGM and adheres to a diabetes treatment plan "       Medications Ordered This Encounter   Medications    boric acid (PH-D) 600 mg vaginal suppository     Sig: Place 1 suppository (600 mg total) vaginally every evening. for 24 days     Dispense:  24 suppository     Refill:  0

## 2023-01-04 NOTE — TELEPHONE ENCOUNTER
I do not see either of these medications on patient's med list. I do not have access to Dr. First to check. Please advise.

## 2023-01-04 NOTE — PROGRESS NOTES
Please inform patient of results.    1. Urine culture shows concern for infection.  Is she having any uti symptoms? If yes, we can send in antibiotics while waiting for final culture results.  If not, encourage fluids and we will wait for final results and call her when available      Other labwork within acceptable ranges.

## 2023-01-05 DIAGNOSIS — N39.0 UTI (URINARY TRACT INFECTION) DUE TO ENTEROCOCCUS: Primary | ICD-10-CM

## 2023-01-05 DIAGNOSIS — B95.2 UTI (URINARY TRACT INFECTION) DUE TO ENTEROCOCCUS: Primary | ICD-10-CM

## 2023-01-05 LAB — BACTERIA UR CULT: ABNORMAL

## 2023-01-05 RX ORDER — NITROFURANTOIN 25; 75 MG/1; MG/1
100 CAPSULE ORAL 2 TIMES DAILY
Qty: 14 CAPSULE | Refills: 0 | Status: SHIPPED | OUTPATIENT
Start: 2023-01-05 | End: 2023-01-12

## 2023-01-05 NOTE — PROGRESS NOTES
Please inform patient of results.    1. Final urine culture shows infection with e coli.  I will send in antibiotics- macrobid.  Take as directed. Encourage fluids.  Contact clinic for concerns.

## 2023-01-06 NOTE — TELEPHONE ENCOUNTER
I looked in [place] on dr. First.  Flexeril 10mg directions: takd 1/2 tab po tid prn muscle spasm #10 was prescribed by jyoti garcia on 12/2/22 and filled at [facility].  Looks like she also prescribed her percocet #10.  Was this at a [facility]?  What is she taking these meds for?    Per previous note, this is not listed on her med list in [facility].  Not sure why it does not pull over.

## 2023-02-07 ENCOUNTER — LAB VISIT (OUTPATIENT)
Dept: LAB | Facility: HOSPITAL | Age: 73
End: 2023-02-07
Attending: FAMILY MEDICINE
Payer: MEDICARE

## 2023-02-07 ENCOUNTER — OFFICE VISIT (OUTPATIENT)
Dept: FAMILY MEDICINE | Facility: CLINIC | Age: 73
End: 2023-02-07
Payer: MEDICARE

## 2023-02-07 VITALS
TEMPERATURE: 98 F | HEIGHT: 64 IN | OXYGEN SATURATION: 98 % | RESPIRATION RATE: 16 BRPM | DIASTOLIC BLOOD PRESSURE: 78 MMHG | WEIGHT: 166.69 LBS | SYSTOLIC BLOOD PRESSURE: 118 MMHG | HEART RATE: 64 BPM | BODY MASS INDEX: 28.46 KG/M2

## 2023-02-07 DIAGNOSIS — N18.32 STAGE 3B CHRONIC KIDNEY DISEASE: ICD-10-CM

## 2023-02-07 DIAGNOSIS — Z71.89 ADVANCED CARE PLANNING/COUNSELING DISCUSSION: ICD-10-CM

## 2023-02-07 DIAGNOSIS — I15.2 HYPERTENSION ASSOCIATED WITH DIABETES: ICD-10-CM

## 2023-02-07 DIAGNOSIS — T46.6X5A MYALGIA DUE TO STATIN: ICD-10-CM

## 2023-02-07 DIAGNOSIS — F41.9 ANXIETY: ICD-10-CM

## 2023-02-07 DIAGNOSIS — K21.9 GASTROESOPHAGEAL REFLUX DISEASE, UNSPECIFIED WHETHER ESOPHAGITIS PRESENT: ICD-10-CM

## 2023-02-07 DIAGNOSIS — M10.9 GOUT, UNSPECIFIED CAUSE, UNSPECIFIED CHRONICITY, UNSPECIFIED SITE: ICD-10-CM

## 2023-02-07 DIAGNOSIS — Z85.3 HISTORY OF BREAST CANCER: ICD-10-CM

## 2023-02-07 DIAGNOSIS — F32.A DEPRESSIVE DISORDER: ICD-10-CM

## 2023-02-07 DIAGNOSIS — R39.9 UTI SYMPTOMS: ICD-10-CM

## 2023-02-07 DIAGNOSIS — E11.69 HYPERLIPIDEMIA ASSOCIATED WITH TYPE 2 DIABETES MELLITUS: ICD-10-CM

## 2023-02-07 DIAGNOSIS — Z78.0 POSTMENOPAUSAL: ICD-10-CM

## 2023-02-07 DIAGNOSIS — E03.9 HYPOTHYROIDISM, UNSPECIFIED TYPE: ICD-10-CM

## 2023-02-07 DIAGNOSIS — M81.0 OSTEOPOROSIS, UNSPECIFIED OSTEOPOROSIS TYPE, UNSPECIFIED PATHOLOGICAL FRACTURE PRESENCE: ICD-10-CM

## 2023-02-07 DIAGNOSIS — M79.10 MYALGIA DUE TO STATIN: ICD-10-CM

## 2023-02-07 DIAGNOSIS — Z00.00 WELLNESS EXAMINATION: Primary | ICD-10-CM

## 2023-02-07 DIAGNOSIS — E11.59 HYPERTENSION ASSOCIATED WITH DIABETES: ICD-10-CM

## 2023-02-07 DIAGNOSIS — E11.65 TYPE 2 DIABETES MELLITUS WITH HYPERGLYCEMIA, WITHOUT LONG-TERM CURRENT USE OF INSULIN: ICD-10-CM

## 2023-02-07 DIAGNOSIS — E78.5 HYPERLIPIDEMIA ASSOCIATED WITH TYPE 2 DIABETES MELLITUS: ICD-10-CM

## 2023-02-07 LAB — URATE SERPL-MCNC: 9.5 MG/DL (ref 2.6–6)

## 2023-02-07 PROCEDURE — 3078F PR MOST RECENT DIASTOLIC BLOOD PRESSURE < 80 MM HG: ICD-10-PCS | Mod: CPTII,,, | Performed by: FAMILY MEDICINE

## 2023-02-07 PROCEDURE — 3008F PR BODY MASS INDEX (BMI) DOCUMENTED: ICD-10-PCS | Mod: CPTII,,, | Performed by: FAMILY MEDICINE

## 2023-02-07 PROCEDURE — 1126F PR PAIN SEVERITY QUANTIFIED, NO PAIN PRESENT: ICD-10-PCS | Mod: CPTII,,, | Performed by: FAMILY MEDICINE

## 2023-02-07 PROCEDURE — 1160F RVW MEDS BY RX/DR IN RCRD: CPT | Mod: CPTII,,, | Performed by: FAMILY MEDICINE

## 2023-02-07 PROCEDURE — 1101F PR PT FALLS ASSESS DOC 0-1 FALLS W/OUT INJ PAST YR: ICD-10-PCS | Mod: CPTII,,, | Performed by: FAMILY MEDICINE

## 2023-02-07 PROCEDURE — 1101F PT FALLS ASSESS-DOCD LE1/YR: CPT | Mod: CPTII,,, | Performed by: FAMILY MEDICINE

## 2023-02-07 PROCEDURE — 36415 COLL VENOUS BLD VENIPUNCTURE: CPT

## 2023-02-07 PROCEDURE — 3074F SYST BP LT 130 MM HG: CPT | Mod: CPTII,,, | Performed by: FAMILY MEDICINE

## 2023-02-07 PROCEDURE — 84550 ASSAY OF BLOOD/URIC ACID: CPT

## 2023-02-07 PROCEDURE — 3008F BODY MASS INDEX DOCD: CPT | Mod: CPTII,,, | Performed by: FAMILY MEDICINE

## 2023-02-07 PROCEDURE — 3288F FALL RISK ASSESSMENT DOCD: CPT | Mod: CPTII,,, | Performed by: FAMILY MEDICINE

## 2023-02-07 PROCEDURE — G0439 PPPS, SUBSEQ VISIT: HCPCS | Mod: ,,, | Performed by: FAMILY MEDICINE

## 2023-02-07 PROCEDURE — G0439 PR MEDICARE ANNUAL WELLNESS SUBSEQUENT VISIT: ICD-10-PCS | Mod: ,,, | Performed by: FAMILY MEDICINE

## 2023-02-07 PROCEDURE — 1159F MED LIST DOCD IN RCRD: CPT | Mod: CPTII,,, | Performed by: FAMILY MEDICINE

## 2023-02-07 PROCEDURE — 1159F PR MEDICATION LIST DOCUMENTED IN MEDICAL RECORD: ICD-10-PCS | Mod: CPTII,,, | Performed by: FAMILY MEDICINE

## 2023-02-07 PROCEDURE — 3288F PR FALLS RISK ASSESSMENT DOCUMENTED: ICD-10-PCS | Mod: CPTII,,, | Performed by: FAMILY MEDICINE

## 2023-02-07 PROCEDURE — 99213 PR OFFICE/OUTPT VISIT, EST, LEVL III, 20-29 MIN: ICD-10-PCS | Mod: 25,,, | Performed by: FAMILY MEDICINE

## 2023-02-07 PROCEDURE — 3074F PR MOST RECENT SYSTOLIC BLOOD PRESSURE < 130 MM HG: ICD-10-PCS | Mod: CPTII,,, | Performed by: FAMILY MEDICINE

## 2023-02-07 PROCEDURE — 1160F PR REVIEW ALL MEDS BY PRESCRIBER/CLIN PHARMACIST DOCUMENTED: ICD-10-PCS | Mod: CPTII,,, | Performed by: FAMILY MEDICINE

## 2023-02-07 PROCEDURE — 1126F AMNT PAIN NOTED NONE PRSNT: CPT | Mod: CPTII,,, | Performed by: FAMILY MEDICINE

## 2023-02-07 PROCEDURE — 3066F PR DOCUMENTATION OF TREATMENT FOR NEPHROPATHY: ICD-10-PCS | Mod: CPTII,,, | Performed by: FAMILY MEDICINE

## 2023-02-07 PROCEDURE — 3078F DIAST BP <80 MM HG: CPT | Mod: CPTII,,, | Performed by: FAMILY MEDICINE

## 2023-02-07 PROCEDURE — 3061F NEG MICROALBUMINURIA REV: CPT | Mod: CPTII,,, | Performed by: FAMILY MEDICINE

## 2023-02-07 PROCEDURE — 3066F NEPHROPATHY DOC TX: CPT | Mod: CPTII,,, | Performed by: FAMILY MEDICINE

## 2023-02-07 PROCEDURE — 3061F PR NEG MICROALBUMINURIA RESULT DOCUMENTED/REVIEW: ICD-10-PCS | Mod: CPTII,,, | Performed by: FAMILY MEDICINE

## 2023-02-07 PROCEDURE — 99213 OFFICE O/P EST LOW 20 MIN: CPT | Mod: 25,,, | Performed by: FAMILY MEDICINE

## 2023-02-07 RX ORDER — INDOMETHACIN 50 MG/1
50 CAPSULE ORAL 2 TIMES DAILY WITH MEALS
Qty: 28 CAPSULE | Refills: 0 | Status: SHIPPED | OUTPATIENT
Start: 2023-02-07 | End: 2023-02-21

## 2023-02-07 RX ORDER — ATORVASTATIN CALCIUM 20 MG/1
20 TABLET, FILM COATED ORAL
COMMUNITY
Start: 2022-12-15 | End: 2023-02-07

## 2023-02-07 RX ORDER — AMLODIPINE BESYLATE 2.5 MG/1
2.5 TABLET ORAL DAILY
COMMUNITY
Start: 2022-12-15 | End: 2023-06-13

## 2023-02-07 RX ORDER — METHYLPREDNISOLONE 4 MG/1
TABLET ORAL
Qty: 21 EACH | Refills: 0 | Status: SHIPPED | OUTPATIENT
Start: 2023-02-07 | End: 2023-02-27 | Stop reason: ALTCHOICE

## 2023-02-07 RX ORDER — AMOXICILLIN AND CLAVULANATE POTASSIUM 875; 125 MG/1; MG/1
1 TABLET, FILM COATED ORAL 2 TIMES DAILY
Qty: 14 TABLET | Refills: 0 | Status: SHIPPED | OUTPATIENT
Start: 2023-02-07 | End: 2023-02-14

## 2023-02-07 RX ORDER — CITALOPRAM 40 MG/1
40 TABLET, FILM COATED ORAL
COMMUNITY
Start: 2022-11-21 | End: 2023-02-07

## 2023-02-07 RX ORDER — EZETIMIBE 10 MG/1
10 TABLET ORAL
COMMUNITY
Start: 2022-12-15 | End: 2023-02-07 | Stop reason: SDUPTHER

## 2023-02-07 RX ORDER — ESOMEPRAZOLE MAGNESIUM 40 MG/1
40 CAPSULE, DELAYED RELEASE ORAL
COMMUNITY
Start: 2022-12-15 | End: 2023-02-07

## 2023-02-07 RX ORDER — EZETIMIBE 10 MG/1
10 TABLET ORAL NIGHTLY
Qty: 90 TABLET | Refills: 3 | Status: SHIPPED | OUTPATIENT
Start: 2023-02-07 | End: 2023-07-14 | Stop reason: SDUPTHER

## 2023-02-07 RX ORDER — SEMAGLUTIDE 1.34 MG/ML
0.25 INJECTION, SOLUTION SUBCUTANEOUS
Qty: 1 PEN | Refills: 5 | Status: SHIPPED | OUTPATIENT
Start: 2023-02-07 | End: 2023-02-09 | Stop reason: SDUPTHER

## 2023-02-07 RX ORDER — ESTRADIOL 1 MG/1
1 TABLET ORAL
COMMUNITY
Start: 2022-12-20 | End: 2023-02-07

## 2023-02-07 NOTE — ASSESSMENT & PLAN NOTE
Will get uric acid level today. Will call with results when available.  Will send in medrol dose pack and indocin. Reminded patient to take with food.  Steroids may cause cbg elevation. Monitor closely. Avoid purines.  Consider starting allopurinol if uric acid is elevated. Encourage fluids. Contact clinic for concerns.  Patient is agreeable to plan and verbalized understanding

## 2023-02-07 NOTE — PROGRESS NOTES
Patient ID: [id number]     Chief Complaint: Medicare AWV (Wellness/Patient has results in chart to be reviewed )      HPI:     Claire Ang is a 73 y.o. female here today for a Medicare Wellness. She has complaints.      Patient presents to the clinic unaccompanied for her wellness visit.  She did labs 1/2023. They were reviewed with patient at time of appt today. Had uti on wellness labs. Was sent in macrobid and completed.  States still having uti symptoms.      Her calcium is elevated. Not taking calcium supplement. Sometimes takes tums.     She also c/o gout in her right great toe. States happened once before. Saw issac.  Not on preventative meds.    Got covid 11/2022 while on cruise. Took ivermectin. Is better.        She presented to the wellness clinic on May 18, 2022.  Due to her weakness and hypotension and abdominal pain she was referred to the emergency room.  Labs and CT scan were done showing cystitis/ UTI.  The patient was given Cipro, Bentyl, Zofran and tramadol and discharged home.  She has not had to take the Bentyl or tramadol or Zofran.        She also has acid reflux and reports compliance with her PPI and H2 blocker.  Previously she saw Dr. Tavera and is requesting to establish with a new Gastro, Dr. Peng.  Her last colonoscopy was with Dr. tavera 1/16/15  Which showed external hemorrhoids and sigmoid diverticulosis       she has diabetes.  She reports compliance with her medications.  Her last A1c was 7.4 1/2023. Her last foot exam was today.  Her ophthalmologist is Dr. Burr who told her that she had the beginnings of macular degeneration and was referred to Dr. Vick whom she saw on March 25.   She was previously on metformin but this was stopped after her labs of 4/2021 showed elevated creatinine.  This was replaced with glipizide but made her nauseous so she stopped. she is currently on glimepiride.       She has hypertension and hyperlipidemia.  Her cardiologist is   Alexux.  She is not taking statin due to knee pain.  Not taking zetia.         She has hypothyroidism and is on Synthroid name brand.       She has anxiety and depression and follows with Psychiatry, Dr. Aguirre.  has appt tomorrow. She is on Wellbutrin.      She has ckd.  Does not see renal.       she has a history of breast cancer and had a right mastectomy.  She is followed by Dr. Tsang.  has appt 8/2023.  She was on tamoxifen.  She wears a prosthetic. mammogram 1/2022 was normal. Mmg 7/2022 was also normal. Has not had breast mri.  We will order. She has had a complete hysterectomy at age 37 her heavy menses and therefore no longer does Pap smears      She has osteoporosis and was on Prolia.  She takes vitamin-D.  Her last bone density test was 8/2021     she has a history of skin cancer.  Her dermatologist is Dr. Trujillo. Has seen him recently.     She is allergic to sulfa, hydrocodone, meperidine and morphine.  She does not smoke    Past Surgical History:   Procedure Laterality Date    APPENDECTOMY      CHOLECYSTECTOMY      COLONOSCOPY  01/16/2015    Rakesh Henriquez MD    HYSTERECTOMY      MASTECTOMY Right     rt kidney stent      UPPER GASTROINTESTINAL ENDOSCOPY  07/12/2022       Review of patient's allergies indicates:   Allergen Reactions    Hydrocodone bitartrate      Other reaction(s): GI Intolerance  Other reaction(s): GI Intolerance  Acetaminophen hydrocodone bitartrate  Acetaminophen hydrocodone bitartrate      Sulfamethoxazole-trimethoprim      Other reaction(s): stomach pain  Other reaction(s): stomach pain      Hydrocodone-acetaminophen Nausea And Vomiting     Other reaction(s): Visual hallucinations  Other reaction(s): Visual hallucinations      Meperidine Nausea And Vomiting     Other reaction(s): GI Intolerance, Visual hallucinations  Other reaction(s): GI Intolerance, Visual hallucinations    Other reaction(s): Vomitting    Morphine Palpitations     Other reaction(s): chest tightness  Other  reaction(s): chest tightness         Outpatient Medications Marked as Taking for the 2/7/23 encounter (Office Visit) with Jany Magana MD   Medication Sig Dispense Refill    amLODIPine (NORVASC) 2.5 MG tablet Take 2.5 mg by mouth.      cetirizine (ZYRTEC) 5 MG tablet Take 5 mg by mouth daily as needed.      cyanocobalamin, vitamin B-12, 1,000 mcg Subl Take by mouth once daily.      FREESTYLE PAULO 14 DAY SENSOR Kit USE TO MONITOR BLOOD SUGAR (DX E11.9) 2 kit 11    mupirocin (BACTROBAN) 2 % ointment APPLY THIN FILM TO AFFECTED AREA THREE TIMES DAILY      pantoprazole (PROTONIX) 40 MG tablet Take 40 mg by mouth every morning.      SYNTHROID 75 mcg tablet Take 1 tablet (75 mcg total) by mouth once daily. 90 tablet 2    valACYclovir (VALTREX) 1000 MG tablet Take by mouth as needed.      valsartan-hydrochlorothiazide (DIOVAN-HCT) 320-25 mg per tablet Take 1 tablet by mouth once daily.      WELLBUTRIN  mg SR12 Take 200 mg by mouth Daily.      [DISCONTINUED] ezetimibe (ZETIA) 10 mg tablet Take 10 mg by mouth.      [DISCONTINUED] glimepiride (AMARYL) 2 MG tablet Take 1 tablet (2 mg total) by mouth once daily. 90 tablet 0    [DISCONTINUED] ondansetron (ZOFRAN-ODT) 4 MG TbDL Take 1 tablet (4 mg total) by mouth every 8 (eight) hours as needed (nausea). 15 tablet 0       Social History     Socioeconomic History    Marital status:    Tobacco Use    Smoking status: Never    Smokeless tobacco: Never   Substance and Sexual Activity    Alcohol use: Not Currently    Drug use: Never   Social History Narrative    ** Merged History Encounter **             History reviewed. No pertinent family history.     Patient Care Team:  Jany Magana MD as PCP - General (Family Medicine)  Rakesh Henriquez MD as Consulting Physician (Gastroenterology)  Mino Vick Jr., MD as Consulting Physician (Ophthalmology)  Marlon Reich II, MD as Consulting Physician (Ophthalmology)  Corey Daigle MD as Consulting Physician  (Cardiology)  Jasmyne Tsang MD as Consulting Physician (Oncology)  Gordon Trujillo MD (Dermatology)       Subjective:     Review of Systems   Constitutional: Negative.    HENT: Negative.     Eyes: Negative.    Respiratory: Negative.     Cardiovascular: Negative.    Gastrointestinal: Negative.    Genitourinary:  Positive for dysuria and frequency.   Musculoskeletal:  Positive for joint pain.   Skin: Negative.    Neurological: Negative.    Endo/Heme/Allergies: Negative.    Psychiatric/Behavioral: Negative.         Patient Reported Health Risk Assessment  [age]  Are you male or female?: Female  During the past four weeks, how much have you been bothered by emotional problems such as feeling anxious, depressed, irritable, sad, or downhearted and blue?: Not at all  During the past five weeks, has your physical and/or emotional health limited your social activities with family, friends, neighbors, or groups?: Not at all  During the past four weeks, how much bodily pain have you generally had?: Moderate pain  During the past four weeks, was someone available to help if you needed and wanted help?: Yes, as much as I wanted  During the past four weeks, what was the hardest physical activity you could do for at least two minutes?: Light  Can you get to places out of walking distance without help?  (For example, can you travel alone on buses or taxis, or drive your own car?): Yes  Can you go shopping for groceries or clothes without someone's help?: Yes  Can you prepare your own meals?: Yes  Can you do your own housework without help?: Yes  Because of any health problems, do you need the help of another person with your personal care needs such as eating, bathing, dressing, or getting around the house?: No  Can you handle your own money without help?: Yes  During the past four weeks, how would you rate your health in general?: Good  How have things been going for you during the past four  "weeks?: Good and bad parts about equal  Are you having difficulties driving your car?: No  Do you always fasten your seat belt when you are in a car?: Yes, usually  How often in the past four weeks have you been bothered by falling or dizzy when standing up?: Never  How often in the past four weeks have you been bothered by sexual problems?: Never  How often in the past four weeks have you been bothered by trouble eating well?: Never  How often in the past four weeks have you been bothered by teeth or denture problems?: Never  How often in the past four weeks have you been bothered with problems using the telephone?: Never  How often in the past four weeks have you been bothered by tiredness or fatigue?: Never  Have you fallen two or more times in the past year?: No  Are you afraid of falling?: No  Are you a smoker?: No  During the past four weeks, how many drinks of wine, beer, or other alcoholic beverages did you have?: No alcohol at all  Do you exercise for about 20 minutes three or more days a week?: Yes, most of the time  Have you been given any information to help you with hazards in your house that might hurt you?: No  Have you been given any information to help you with keeping track of your medications?: No  How often do you have trouble taking medicines the way you've been told to take them?: I always take them as prescribed  How confident are you that you can control and manage most of your health problems?: Very confident  What is your race? (Check all that apply.):     Objective:     /78 (BP Location: Left arm)   Pulse 64   Temp 97.8 °F (36.6 °C) (Temporal)   Resp 16   Ht 5' 4" (1.626 m)   Wt 75.6 kg (166 lb 11.2 oz)   SpO2 98%   BMI 28.61 kg/m²     Physical Exam  Vitals and nursing note reviewed.   Constitutional:       Appearance: Normal appearance. She is normal weight.   HENT:      Head: Normocephalic and atraumatic.      Nose: Nose normal.      Mouth/Throat:      Mouth: Mucous " membranes are moist.      Pharynx: Oropharynx is clear.   Eyes:      Extraocular Movements: Extraocular movements intact.   Cardiovascular:      Rate and Rhythm: Normal rate and regular rhythm.      Pulses: Normal pulses.           Dorsalis pedis pulses are 2+ on the right side and 2+ on the left side.        Posterior tibial pulses are 2+ on the right side and 2+ on the left side.      Heart sounds: Normal heart sounds.   Pulmonary:      Effort: Pulmonary effort is normal.      Breath sounds: Normal breath sounds.   Musculoskeletal:         General: Normal range of motion.      Cervical back: Normal range of motion.        Feet:    Feet:      Right foot:      Protective Sensation: 8 sites tested.   1 site sensed.     Skin integrity: Skin integrity normal.      Left foot:      Protective Sensation: 8 sites tested.  3 sites sensed.      Skin integrity: Erythema present.      Comments: Left MCP is swollen and hot to touch and painful  Skin:     General: Skin is warm and dry.   Neurological:      General: No focal deficit present.      Mental Status: She is alert and oriented to person, place, and time. Mental status is at baseline.   Psychiatric:         Mood and Affect: Mood normal.         No flowsheet data found.  Fall Risk Assessment - Outpatient 2/7/2023 10/26/2022 9/12/2022 8/10/2022 8/9/2022 7/13/2022 5/26/2022   Mobility Status Ambulatory Ambulatory Ambulatory Ambulatory Ambulatory Ambulatory Ambulatory   Number of falls 0 0 0 0 0 0 0   Identified as fall risk 0 0 0 0 0 0 0           Depression Screening  Over the past two weeks, has the patient felt down, depressed, or hopeless?: No  Over the past two weeks, has the patient felt little interest or pleasure in doing things?: No  Functional Ability/Safety Screening  Was the patient's timed Up & Go test unsteady or longer than 30 seconds?: No  Does the patient need help with phone, transportation, shopping, preparing meals, housework, laundry, meds, or managing  money?: No  Does the patient's home have rugs in the hallway, lack grab bars in the bathroom, lack handrails on the stairs or have poor lighting?: No  Have you noticed any hearing difficulties?: No  Cognitive Function (Assessed through direct observation with due consideration of information obtained by way of patient reports and/or concerns raised by family, friends, caretakers, or others)    Does the patient repeat questions/statements in the same day?: No  Does the patient have trouble remembering the date, year, and time?: No  Does the patient have difficulty managing finances?: No  Does the patient have a decreased sense of direction?: No  Assessment/Plan:       Medicare Annual Wellness and Personalized Prevention Plan:   Fall Risk + Home Safety + Hearing Impairment + Depression Screen + Cognitive Impairment Screen + Health Risk Assessment all reviewed.     Opioid Screening: Patient medication list reviewed, patient is not taking prescription opioids. Patient is not using additional opioids than prescribed. Patient is at low risk of substance abuse based on this opioid use history.         Health Maintenance Topics with due status: Not Due       Topic Last Completion Date    Colorectal Cancer Screening 02/08/2018    DEXA Scan 08/19/2021    Mammogram 07/21/2022    Eye Exam 09/07/2022    Diabetes Urine Screening 01/03/2023    Lipid Panel 01/03/2023    Hemoglobin A1c 01/03/2023    Foot Exam 02/07/2023      The patient's Health Maintenance was reviewed and the following appears to be due at this time:   Health Maintenance Due   Topic Date Due    Low Dose Statin  Never done    COVID-19 Vaccine (4 - Booster for Pfizer series) 05/17/2021         1. Wellness examination  Assessment & Plan:  Previously done labs reviewed with patient at time of appointment today  Mammogram  7/2022  DEXA  8/2021. Repeat ordered  No longer does pap due to complete hyst at age 37  Colonoscopy with Dr. macdonald 1/2015    Advanced care  planning discussed and paperwork given        2. Advanced care planning/counseling discussion  Assessment & Plan:  Advanced care planning discussed and paperwork given.    I attest that I have had a face to face discussion with patient and or surrogate decision maker.   Included surrogate decision maker: NO  Advanced directive in chart: NO  LAPOST: NO    Total time spent: 16 minutes        3. Gastroesophageal reflux disease, unspecified whether esophagitis present  Assessment & Plan:  On PPI and H2 blocker.  Needs to establish with new GI since dr. tavera retired.        4. Type 2 diabetes mellitus with hyperglycemia, without long-term current use of insulin  Assessment & Plan:  Lab Results   Component Value Date    HGBA1C 7.4 (H) 01/03/2023     On glimepiride. Reports hypoglycemic episodes. Wants to try ozempic. Demonstrator pen used.   rx sent in. Contact clinic for concerns.  Unable to tolerate statin.  On arb    Foot exam today  Eye exam with dr. estrella chavez 1/2023    Encouraged compliance with dmii diet.     Orders:  -     semaglutide (OZEMPIC) 0.25 mg or 0.5 mg(2 mg/1.5 mL) pen injector; Inject 0.25 mg into the skin every 7 days.  Dispense: 1 pen; Refill: 5    5. Hyperlipidemia associated with type 2 diabetes mellitus  Assessment & Plan:  On zetia.   Statin Intolerant  Keep appts with cards    Orders:  -     ezetimibe (ZETIA) 10 mg tablet; Take 1 tablet (10 mg total) by mouth every evening.  Dispense: 90 tablet; Refill: 3    6. Hypertension associated with diabetes  Assessment & Plan:  BP at goal  Continue current prescription meds. Keep appts with cards      7. Hypothyroidism, unspecified type  Assessment & Plan:  Lab Results   Component Value Date    TSH 1.934 01/03/2023     Stable on current prescription meds      8. Anxiety  Assessment & Plan:  Stable on current prescription meds. Keep appointments with psych      9. Depressive disorder  Assessment & Plan:  Stable on current prescription meds.  Keep appointments with psych      10. History of breast cancer  Assessment & Plan:  Keep appointments with oncology. Mammogram 7/2022 normal. Will order breast MRI    Orders:  -     MRI Breast Bilateral W WO Contrast; Future; Expected date: 02/07/2023    11. Postmenopausal  Assessment & Plan:  dexa 8/2021. On vit d. Was on prolia in the past. Not currently. Encouraged weight bearing exercises.  Repeat ordered. Repeat dexa ordered    Orders:  -     DXA Bone Density Spine And Hip; Future; Expected date: 02/07/2023    12. Osteoporosis, unspecified osteoporosis type, unspecified pathological fracture presence  Assessment & Plan:  See postmenopausal A&P      13. Stage 3b chronic kidney disease  Assessment & Plan:  Encourage fluids. Monitor. Does not see renal.      14. UTI symptoms  Assessment & Plan:  Treated with macrobid 1/2023. Still reporting symptoms. Repeat ua and urine culture collected today. Encourage fluids. Will call with results when available.    Orders:  -     Urinalysis, Reflex to Urine Culture; Future; Expected date: 02/07/2023  -     Urine culture; Future; Expected date: 02/07/2023  -     amoxicillin-clavulanate 875-125mg (AUGMENTIN) 875-125 mg per tablet; Take 1 tablet by mouth 2 (two) times daily. for 7 days  Dispense: 14 tablet; Refill: 0    15. Gout, unspecified cause, unspecified chronicity, unspecified site  Assessment & Plan:  Will get uric acid level today. Will call with results when available.  Will send in medrol dose pack and indocin. Reminded patient to take with food.  Steroids may cause cbg elevation. Monitor closely. Avoid purines.  Consider starting allopurinol if uric acid is elevated. Encourage fluids. Contact clinic for concerns.  Patient is agreeable to plan and verbalized understanding    Orders:  -     Uric Acid; Future; Expected date: 02/07/2023  -     methylPREDNISolone (MEDROL DOSEPACK) 4 mg tablet; use as directed  Dispense: 21 each; Refill: 0  -     indomethacin (INDOCIN) 50  MG capsule; Take 1 capsule (50 mg total) by mouth 2 (two) times daily with meals. For pain for 14 days  Dispense: 28 capsule; Refill: 0    16. Myalgia due to statin  Assessment & Plan:  Unable to tolerate statin. On zetia. Keep appts with cards      Other orders  -     Cancel: Ambulatory referral/consult to Gastroenterology; Future; Expected date: 02/14/2023         Advance Care Planning   I attest to discussing Advance Care Planning with patient and/or family member.  Education was provided including the importance of the Health Care Power of , Advance Directives, and/or [place] documentation.  The patient expressed understanding to the importance of this information and discussion.  Length of ACP conversation in minutes: 16    Separate complaint outside of wellness visit  CC:    States still having uti symptoms.   Completed macrobid from wellness labs. She also c/o red and hot and swollen in her right great toe x a few days. States happened once before. Saw issac.  Not on preventative meds. Sheet hurts and wearing shoes hurt.   ROS: negative other than above  PE: negative other than above  A/P: 1. UTI-  Treated with macrobid 1/2023. Still reporting symptoms. Repeat ua and urine culture collected today. Encourage fluids. Will call with results when available.  2. Gout- Will get uric acid level today. Will call with results when available.  Will send in medrol dose pack and indocin. Reminded patient to take with food.  Steroids may cause cbg elevation. Monitor closely. Avoid purines.  Consider starting allopurinol if uric acid is elevated. Encourage fluids. Contact clinic for concerns.  Patient is agreeable to plan and verbalized understanding               Medication List with Changes/Refills   New Medications    AMOXICILLIN-CLAVULANATE 875-125MG (AUGMENTIN) 875-125 MG PER TABLET    Take 1 tablet by mouth 2 (two) times daily. for 7 days       Start Date: 2/7/2023  End Date: 2/14/2023    INDOMETHACIN  (INDOCIN) 50 MG CAPSULE    Take 1 capsule (50 mg total) by mouth 2 (two) times daily with meals. For pain for 14 days       Start Date: 2/7/2023  End Date: 2/21/2023    METHYLPREDNISOLONE (MEDROL DOSEPACK) 4 MG TABLET    use as directed       Start Date: 2/7/2023  End Date: 2/28/2023    SEMAGLUTIDE (OZEMPIC) 0.25 MG OR 0.5 MG(2 MG/1.5 ML) PEN INJECTOR    Inject 0.25 mg into the skin every 7 days.       Start Date: 2/7/2023  End Date: 2/7/2024   Current Medications    AMLODIPINE (NORVASC) 2.5 MG TABLET    Take 2.5 mg by mouth.       Start Date: 12/15/2022End Date: --    CETIRIZINE (ZYRTEC) 5 MG TABLET    Take 5 mg by mouth daily as needed.       Start Date: 11/24/2021End Date: --    CYANOCOBALAMIN, VITAMIN B-12, 1,000 MCG SUBL    Take by mouth once daily.       Start Date: --        End Date: --    Shoulder OptionsE 14 DAY SENSOR KIT    USE TO MONITOR BLOOD SUGAR (DX E11.9)       Start Date: 5/26/2022 End Date: --    MUPIROCIN (BACTROBAN) 2 % OINTMENT    APPLY THIN FILM TO AFFECTED AREA THREE TIMES DAILY       Start Date: 5/2/2022  End Date: --    PANTOPRAZOLE (PROTONIX) 40 MG TABLET    Take 40 mg by mouth every morning.       Start Date: 10/20/2022End Date: --    SYNTHROID 75 MCG TABLET    Take 1 tablet (75 mcg total) by mouth once daily.       Start Date: 10/26/2022End Date: --    VALACYCLOVIR (VALTREX) 1000 MG TABLET    Take by mouth as needed.       Start Date: 7/15/2022 End Date: --    VALSARTAN-HYDROCHLOROTHIAZIDE (DIOVAN-HCT) 320-25 MG PER TABLET    Take 1 tablet by mouth once daily.       Start Date: 5/3/2022  End Date: --    WELLBUTRIN  MG SR12    Take 200 mg by mouth Daily.       Start Date: 2/7/2022  End Date: --   Changed and/or Refilled Medications    Modified Medication Previous Medication    EZETIMIBE (ZETIA) 10 MG TABLET ezetimibe (ZETIA) 10 mg tablet       Take 1 tablet (10 mg total) by mouth every evening.    Take 10 mg by mouth.       Start Date: 2/7/2023  End Date: 2/7/2024    Start Date:  12/15/2022End Date: 2/7/2023   Discontinued Medications    ATORVASTATIN (LIPITOR) 20 MG TABLET    Take 20 mg by mouth.       Start Date: 12/15/2022End Date: 2/7/2023    CITALOPRAM (CELEXA) 40 MG TABLET    Take 40 mg by mouth.       Start Date: 11/21/2022End Date: 2/7/2023    CLONAZEPAM (KLONOPIN) 0.5 MG TABLET    Take 0.5 mg by mouth daily as needed.       Start Date: 10/19/2022End Date: 2/7/2023    ESOMEPRAZOLE (NEXIUM) 40 MG CAPSULE    Take 40 mg by mouth.       Start Date: 12/15/2022End Date: 2/7/2023    ESTRADIOL (ESTRACE) 1 MG TABLET    Take 1 mg by mouth.       Start Date: 12/20/2022End Date: 2/7/2023    FAMOTIDINE (PEPCID) 20 MG TABLET    Take 20 mg by mouth nightly as needed.       Start Date: 3/29/2022 End Date: 2/7/2023    GLIMEPIRIDE (AMARYL) 2 MG TABLET    Take 1 tablet (2 mg total) by mouth once daily.       Start Date: 10/26/2022End Date: 2/7/2023    ONDANSETRON (ZOFRAN-ODT) 4 MG TBDL    Take 1 tablet (4 mg total) by mouth every 8 (eight) hours as needed (nausea).       Start Date: 12/2/2022 End Date: 2/7/2023    OXYCODONE-ACETAMINOPHEN (PERCOCET) 5-325 MG PER TABLET    Take 1 tablet by mouth every 8 (eight) hours as needed for Pain.       Start Date: 12/2/2022 End Date: 2/7/2023        Follow up in about 6 weeks (around 3/21/2023) for diabetes. In addition to their scheduled follow up, the patient has also been instructed to follow up on as needed basis.

## 2023-02-07 NOTE — ASSESSMENT & PLAN NOTE
Lab Results   Component Value Date    TSH 1.934 01/03/2023     Stable on current prescription meds

## 2023-02-07 NOTE — PROGRESS NOTES
Please inform patient of results.    1. Uric acid is elevated. This confirms gout dx.  Take her steroid pills and indocin with food as needed for pain.  Once this flare resolves, we can consider adding allopurinol daily to prevent further gout attacks.  Please have her call us once her gout symptoms have resolved.

## 2023-02-07 NOTE — ASSESSMENT & PLAN NOTE
Previously done labs reviewed with patient at time of appointment today  Mammogram  7/2022  DEXA  8/2021. Repeat ordered  No longer does pap due to complete hyst at age 37  Colonoscopy with Dr. macdonald 1/2015    Advanced care planning discussed and paperwork given

## 2023-02-07 NOTE — ASSESSMENT & PLAN NOTE
Lab Results   Component Value Date    HGBA1C 7.4 (H) 01/03/2023     On glimepiride. Reports hypoglycemic episodes. Wants to try ozempic. Demonstrator pen used.   rx sent in. Contact clinic for concerns.  Unable to tolerate statin.  On arb    Foot exam today  Eye exam with dr. de la rosa   Urine micro 1/2023    Encouraged compliance with dmii diet.

## 2023-02-07 NOTE — ASSESSMENT & PLAN NOTE
dexa 8/2021. On vit d. Was on prolia in the past. Not currently. Encouraged weight bearing exercises.  Repeat ordered. Repeat dexa ordered

## 2023-02-07 NOTE — ASSESSMENT & PLAN NOTE
Treated with macrobid 1/2023. Still reporting symptoms. Repeat ua and urine culture collected today. Encourage fluids. Will call with results when available.

## 2023-02-09 DIAGNOSIS — E11.65 TYPE 2 DIABETES MELLITUS WITH HYPERGLYCEMIA, WITHOUT LONG-TERM CURRENT USE OF INSULIN: ICD-10-CM

## 2023-02-09 RX ORDER — MUPIROCIN 20 MG/G
OINTMENT TOPICAL 3 TIMES DAILY
Qty: 30 G | Refills: 11 | Status: SHIPPED | OUTPATIENT
Start: 2023-02-09 | End: 2023-06-13

## 2023-02-09 RX ORDER — SEMAGLUTIDE 1.34 MG/ML
0.25 INJECTION, SOLUTION SUBCUTANEOUS
Qty: 1 PEN | Refills: 5 | Status: SHIPPED | OUTPATIENT
Start: 2023-02-09 | End: 2023-02-23 | Stop reason: SDUPTHER

## 2023-02-09 NOTE — TELEPHONE ENCOUNTER
[facility] does not have ozempic in stock. I talked to the patient and found it at [facility] retail pharmacy. Can we send script there? Will cancel at [facility]. Proposed.

## 2023-02-22 ENCOUNTER — TELEPHONE (OUTPATIENT)
Dept: FAMILY MEDICINE | Facility: CLINIC | Age: 73
End: 2023-02-22
Payer: MEDICARE

## 2023-02-22 DIAGNOSIS — E11.65 TYPE 2 DIABETES MELLITUS WITH HYPERGLYCEMIA, WITHOUT LONG-TERM CURRENT USE OF INSULIN: ICD-10-CM

## 2023-02-22 NOTE — TELEPHONE ENCOUNTER
States her sugar has been hanging around 170-180. States she feels fatigued constantly. Takes Ozempic 0.5 mg weekly would like to know if she needs adjust her dose?

## 2023-02-22 NOTE — TELEPHONE ENCOUNTER
She needs to be on same dose x 4 weeks before we can increase.  So she should have started on 0.25mg SQ once weekly x 4 weeks, then increased to 0.5mg SQ once weekly x 4 weeks.  If she has been on 0.5mg SQ x 4 weeks, then we can increase to 1mg SQ once weekly.  Let me know and I can send in new rx if needed.  Continue to monitor cbgs and call with concerns.

## 2023-02-22 NOTE — TELEPHONE ENCOUNTER
----- Message from Saida Mahmood sent at 2/22/2023  3:59 PM CST -----  Regarding: Med advice  .Type:  Needs Medical Advice    Who Called: Pt  Symptoms (please be specific): High sugar   How long has patient had these symptoms:    Pharmacy name and phone #:    Would the patient rather a call back or a response via [facility]? Call back  Best Call Back Number: [phone]  Additional Information: Pt states that even with the meds she's started her sugar is remaining high.

## 2023-02-23 RX ORDER — SEMAGLUTIDE 1.34 MG/ML
1 INJECTION, SOLUTION SUBCUTANEOUS
Qty: 2 PEN | Refills: 11 | Status: SHIPPED | OUTPATIENT
Start: 2023-02-23 | End: 2023-03-27

## 2023-02-23 NOTE — TELEPHONE ENCOUNTER
I have signed for the following orders AND/OR meds.  Please call the patient and ask the patient to schedule the testing AND/OR inform about any medications that were sent.        Medications Ordered This Encounter   Medications    semaglutide (OZEMPIC) 0.25 mg or 0.5 mg(2 mg/1.5 mL) pen injector     Sig: Inject 1 mg into the skin every 7 days.     Dispense:  2 pen     Refill:  11

## 2023-02-27 ENCOUNTER — OFFICE VISIT (OUTPATIENT)
Dept: FAMILY MEDICINE | Facility: CLINIC | Age: 73
End: 2023-02-27
Payer: MEDICARE

## 2023-02-27 VITALS
OXYGEN SATURATION: 97 % | HEART RATE: 73 BPM | DIASTOLIC BLOOD PRESSURE: 74 MMHG | HEIGHT: 64 IN | BODY MASS INDEX: 28.61 KG/M2 | RESPIRATION RATE: 18 BRPM | SYSTOLIC BLOOD PRESSURE: 119 MMHG

## 2023-02-27 DIAGNOSIS — Z01.818 PRE-OP EXAM: Primary | ICD-10-CM

## 2023-02-27 DIAGNOSIS — M10.9 ACUTE GOUT INVOLVING TOE OF LEFT FOOT, UNSPECIFIED CAUSE: ICD-10-CM

## 2023-02-27 PROCEDURE — 3061F NEG MICROALBUMINURIA REV: CPT | Mod: CPTII,,, | Performed by: NURSE PRACTITIONER

## 2023-02-27 PROCEDURE — 99214 PR OFFICE/OUTPT VISIT, EST, LEVL IV, 30-39 MIN: ICD-10-PCS | Mod: ,,, | Performed by: NURSE PRACTITIONER

## 2023-02-27 PROCEDURE — 1159F MED LIST DOCD IN RCRD: CPT | Mod: CPTII,,, | Performed by: NURSE PRACTITIONER

## 2023-02-27 PROCEDURE — 3066F NEPHROPATHY DOC TX: CPT | Mod: CPTII,,, | Performed by: NURSE PRACTITIONER

## 2023-02-27 PROCEDURE — 3078F DIAST BP <80 MM HG: CPT | Mod: CPTII,,, | Performed by: NURSE PRACTITIONER

## 2023-02-27 PROCEDURE — 99214 OFFICE O/P EST MOD 30 MIN: CPT | Mod: ,,, | Performed by: NURSE PRACTITIONER

## 2023-02-27 PROCEDURE — 3074F PR MOST RECENT SYSTOLIC BLOOD PRESSURE < 130 MM HG: ICD-10-PCS | Mod: CPTII,,, | Performed by: NURSE PRACTITIONER

## 2023-02-27 PROCEDURE — 3074F SYST BP LT 130 MM HG: CPT | Mod: CPTII,,, | Performed by: NURSE PRACTITIONER

## 2023-02-27 PROCEDURE — 3061F PR NEG MICROALBUMINURIA RESULT DOCUMENTED/REVIEW: ICD-10-PCS | Mod: CPTII,,, | Performed by: NURSE PRACTITIONER

## 2023-02-27 PROCEDURE — 3008F BODY MASS INDEX DOCD: CPT | Mod: CPTII,,, | Performed by: NURSE PRACTITIONER

## 2023-02-27 PROCEDURE — 3008F PR BODY MASS INDEX (BMI) DOCUMENTED: ICD-10-PCS | Mod: CPTII,,, | Performed by: NURSE PRACTITIONER

## 2023-02-27 PROCEDURE — 1159F PR MEDICATION LIST DOCUMENTED IN MEDICAL RECORD: ICD-10-PCS | Mod: CPTII,,, | Performed by: NURSE PRACTITIONER

## 2023-02-27 PROCEDURE — 1160F RVW MEDS BY RX/DR IN RCRD: CPT | Mod: CPTII,,, | Performed by: NURSE PRACTITIONER

## 2023-02-27 PROCEDURE — 1125F AMNT PAIN NOTED PAIN PRSNT: CPT | Mod: CPTII,,, | Performed by: NURSE PRACTITIONER

## 2023-02-27 PROCEDURE — 3078F PR MOST RECENT DIASTOLIC BLOOD PRESSURE < 80 MM HG: ICD-10-PCS | Mod: CPTII,,, | Performed by: NURSE PRACTITIONER

## 2023-02-27 PROCEDURE — 1125F PR PAIN SEVERITY QUANTIFIED, PAIN PRESENT: ICD-10-PCS | Mod: CPTII,,, | Performed by: NURSE PRACTITIONER

## 2023-02-27 PROCEDURE — 3066F PR DOCUMENTATION OF TREATMENT FOR NEPHROPATHY: ICD-10-PCS | Mod: CPTII,,, | Performed by: NURSE PRACTITIONER

## 2023-02-27 PROCEDURE — 1160F PR REVIEW ALL MEDS BY PRESCRIBER/CLIN PHARMACIST DOCUMENTED: ICD-10-PCS | Mod: CPTII,,, | Performed by: NURSE PRACTITIONER

## 2023-02-27 RX ORDER — PREDNISONE 20 MG/1
20 TABLET ORAL
COMMUNITY
Start: 2023-02-27 | End: 2023-03-06

## 2023-02-27 RX ORDER — COLCHICINE 0.6 MG/1
TABLET ORAL
Qty: 6 TABLET | Refills: 0 | Status: SHIPPED | OUTPATIENT
Start: 2023-02-27 | End: 2023-06-13

## 2023-02-27 RX ORDER — TRAMADOL HYDROCHLORIDE 50 MG/1
50 TABLET ORAL EVERY 6 HOURS PRN
COMMUNITY
Start: 2023-02-27 | End: 2023-03-04

## 2023-02-27 NOTE — ASSESSMENT & PLAN NOTE
Pt without any issues  She is medically cleared for surgery; will fax paperwork to Dr. Zapien's office

## 2023-02-27 NOTE — PROGRESS NOTES
"Subjective:      Patient ID: Claire Ang is a 73 y.o. White female      Chief Complaint: Follow-up (L toe nail turning black went to [facility] last night; )       Past Medical History:   Diagnosis Date    Anxiety     Diabetes mellitus     Fibromyalgia     Gastric ulcer     Gout, unspecified     Hypothyroidism     Nephrolithiasis     Onychomycosis     Osteoporosis     Stage 3b chronic kidney disease         HPI  Presents to clinic for ED follow up and pre-op examination.      Presents to clinic for ED follow up s/t to left great toe pain.  Seen in ED last night.  Pt diagnosed with Gout.  Given Ketorolac injection.  States improved symptoms.  Given Rx Prednisone and Tramadol; did not fill.  States toenail is now "black."  Of note, pt has hx of Gout; last episode last year. Denies any other problems.      Pre-OP exam  Surgery: Left Cataract  Surgeon: Dr. Zapien  Date: 2/27/2023  Anesthesia type: unknown  Allergies to medications: see allergy list  Previous exposure to anesthesia: yes  Complications secondary to anesthesia:  no     Preoperative Evaluation:  Step 1: Emergency Surgery? No  Step 2: Has coronary revascularization been done in the past 5 years? No  Step 3: Has coronary angiography or stress test been done in past 2 years? No  Step 4: Evaluate clinical predictors:  No symptoms of stable or unstable angina, no history of arrhythmias, no history of severe valvular heart disease, no history of uncontrolled hypertension, no history of abnormal EKG, no history of prior myocardial infarction, no history to suggest congestive heart failure, no history of prior CHF,  Hx of Diabetes; Hx of CKD  METS Score > 4: she is able to perform aerobic exercise 45 minutes twice a week without stopping; also able to climb one flight of stairs without having to rest     EKG performed today: n/a (performed per Cardiology)  Preoperative Labs ordered: n/a  Medications adjusted:             Review of Systems   Constitutional: " Negative.  Negative for appetite change, chills and fever.   HENT: Negative.     Eyes: Negative.  Negative for discharge and redness.   Respiratory: Negative.  Negative for shortness of breath.    Cardiovascular: Negative.  Negative for chest pain.   Gastrointestinal: Negative.    Endocrine: Negative.    Genitourinary: Negative.    Musculoskeletal:         Toe pain   Integumentary:  Negative.   Allergic/Immunologic: Negative.    Neurological: Negative.    Psychiatric/Behavioral: Negative.     All other systems reviewed and are negative.       Objective:      Vitals:    02/27/23 0927   BP: 119/74   Pulse: 73   Resp: 18      Body mass index is 28.61 kg/m².     Physical Exam  Vitals reviewed.   Constitutional:       Appearance: Normal appearance.   HENT:      Head: Normocephalic.      Mouth/Throat:      Mouth: Mucous membranes are moist.   Eyes:      Conjunctiva/sclera: Conjunctivae normal.      Pupils: Pupils are equal, round, and reactive to light.   Cardiovascular:      Rate and Rhythm: Normal rate and regular rhythm.   Pulmonary:      Effort: Pulmonary effort is normal. No respiratory distress.      Breath sounds: Normal breath sounds.   Musculoskeletal:         General: Normal range of motion.      Cervical back: Normal range of motion and neck supple.   Feet:      Comments: Mild bruising left great toe; mild edema noted; capillary refill < 3 seconds; normal temperature;   Skin:     General: Skin is warm and dry.   Neurological:      Mental Status: She is alert and oriented to person, place, and time.   Psychiatric:         Mood and Affect: Mood normal.          Current Outpatient Medications:     amLODIPine (NORVASC) 2.5 MG tablet, Take 2.5 mg by mouth., Disp: , Rfl:     cetirizine (ZYRTEC) 5 MG tablet, Take 5 mg by mouth daily as needed., Disp: , Rfl:     cyanocobalamin, vitamin B-12, 1,000 mcg Subl, Take by mouth once daily., Disp: , Rfl:     ezetimibe (ZETIA) 10 mg tablet, Take 1 tablet (10 mg total) by mouth  every evening., Disp: 90 tablet, Rfl: 3    FREESTYLE PAULO 14 DAY SENSOR Kit, USE TO MONITOR BLOOD SUGAR (DX E11.9), Disp: 2 kit, Rfl: 11    mupirocin (BACTROBAN) 2 % ointment, Apply topically 3 (three) times daily., Disp: 30 g, Rfl: 11    pantoprazole (PROTONIX) 40 MG tablet, Take 40 mg by mouth every morning., Disp: , Rfl:     predniSONE (DELTASONE) 20 MG tablet, Take 20 mg by mouth., Disp: , Rfl:     semaglutide (OZEMPIC) 0.25 mg or 0.5 mg(2 mg/1.5 mL) pen injector, Inject 1 mg into the skin every 7 days., Disp: 2 pen, Rfl: 11    SYNTHROID 75 mcg tablet, Take 1 tablet (75 mcg total) by mouth once daily., Disp: 90 tablet, Rfl: 2    traMADoL (ULTRAM) 50 mg tablet, Take 50 mg by mouth every 6 (six) hours as needed., Disp: , Rfl:     valACYclovir (VALTREX) 1000 MG tablet, Take by mouth as needed., Disp: , Rfl:     valsartan-hydrochlorothiazide (DIOVAN-HCT) 320-25 mg per tablet, Take 1 tablet by mouth once daily., Disp: , Rfl:     WELLBUTRIN  mg SR12, Take 200 mg by mouth Daily., Disp: , Rfl:     colchicine (COLCRYS) 0.6 mg tablet, Take 2 tablets po once. then take 1 tablet one hour later. Repeat dose in 3 days, Disp: 6 tablet, Rfl: 0    Assessment & Plan:     Problem List Items Addressed This Visit          Orthopedic    Gout     Do not fill Prednisone; Hx DM with elevated blood sugars when taking steroids  She may fill Tramadol ED prescribed  Mild bruising left great toe-nail; + sensation; good circulation; will monitor  Rx Colchcine; take as directed  Instructed to continue to monitor symptoms  F/U with PCP for any worsening symptoms  Pt is agreeable to plan and verbalizes understanding             Relevant Medications    colchicine (COLCRYS) 0.6 mg tablet       Other    Pre-op exam - Primary     Pt without any issues  She is medically cleared for surgery; will fax paperwork to Dr. Zapien's office

## 2023-02-27 NOTE — ASSESSMENT & PLAN NOTE
Do not fill Prednisone; Hx DM with elevated blood sugars when taking steroids  She may fill Tramadol ED prescribed  Mild bruising left great toe-nail; + sensation; good circulation; will monitor  Rx Colchcine; take as directed  Instructed to continue to monitor symptoms  F/U with PCP for any worsening symptoms  Pt is agreeable to plan and verbalizes understanding

## 2023-03-21 ENCOUNTER — LAB VISIT (OUTPATIENT)
Dept: LAB | Facility: HOSPITAL | Age: 73
End: 2023-03-21
Attending: FAMILY MEDICINE
Payer: MEDICARE

## 2023-03-21 ENCOUNTER — TELEPHONE (OUTPATIENT)
Dept: FAMILY MEDICINE | Facility: CLINIC | Age: 73
End: 2023-03-21
Payer: MEDICARE

## 2023-03-21 DIAGNOSIS — R31.9 HEMATURIA, UNSPECIFIED TYPE: ICD-10-CM

## 2023-03-21 DIAGNOSIS — R30.9 PAINFUL URINATION: ICD-10-CM

## 2023-03-21 DIAGNOSIS — R31.9 HEMATURIA, UNSPECIFIED TYPE: Primary | ICD-10-CM

## 2023-03-21 LAB
APPEARANCE UR: ABNORMAL
BACTERIA #/AREA URNS AUTO: ABNORMAL /HPF
BILIRUB UR QL STRIP.AUTO: NEGATIVE MG/DL
COLOR UR AUTO: ABNORMAL
GLUCOSE UR QL STRIP.AUTO: NEGATIVE MG/DL
KETONES UR QL STRIP.AUTO: NEGATIVE MG/DL
LEUKOCYTE ESTERASE UR QL STRIP.AUTO: ABNORMAL UNIT/L
MUCOUS THREADS URNS QL MICRO: ABNORMAL /LPF
NITRITE UR QL STRIP.AUTO: POSITIVE
PH UR STRIP.AUTO: 5.5 [PH]
PROT UR QL STRIP.AUTO: NEGATIVE MG/DL
RBC #/AREA URNS AUTO: ABNORMAL /HPF
RBC UR QL AUTO: ABNORMAL UNIT/L
SP GR UR STRIP.AUTO: 1.02
SQUAMOUS #/AREA URNS AUTO: ABNORMAL /HPF
UROBILINOGEN UR STRIP-ACNC: 0.2 MG/DL
WBC #/AREA URNS AUTO: ABNORMAL /HPF

## 2023-03-21 PROCEDURE — 87077 CULTURE AEROBIC IDENTIFY: CPT

## 2023-03-21 PROCEDURE — 81001 URINALYSIS AUTO W/SCOPE: CPT

## 2023-03-21 PROCEDURE — 87088 URINE BACTERIA CULTURE: CPT

## 2023-03-21 NOTE — TELEPHONE ENCOUNTER
----- Message from Zena Rosenberg sent at 3/21/2023 10:00 AM CDT -----  Regarding: Needs Medical Advice  Type:  Needs Medical Advice    Who Called: pt  Symptoms (please be specific):  painful when urinates; like acid, blood in urine  How long has patient had these symptoms: few days  Pharmacy name and phone #:    Would the patient rather a call back or a response via [facility]? Cb & text through the portal  Best Call Back Number: [phone]  Additional Information: pt is requesting to have a urine test done due to painful symptoms. No answer in backline. Please contact pt

## 2023-03-22 ENCOUNTER — TELEPHONE (OUTPATIENT)
Dept: FAMILY MEDICINE | Facility: CLINIC | Age: 73
End: 2023-03-22
Payer: MEDICARE

## 2023-03-22 DIAGNOSIS — R31.9 HEMATURIA, UNSPECIFIED TYPE: ICD-10-CM

## 2023-03-22 DIAGNOSIS — E11.65 TYPE 2 DIABETES MELLITUS WITH HYPERGLYCEMIA, WITHOUT LONG-TERM CURRENT USE OF INSULIN: Primary | ICD-10-CM

## 2023-03-22 RX ORDER — NITROFURANTOIN 25; 75 MG/1; MG/1
100 CAPSULE ORAL 2 TIMES DAILY
Qty: 20 CAPSULE | Refills: 0 | Status: SHIPPED | OUTPATIENT
Start: 2023-03-22 | End: 2023-03-22 | Stop reason: SDUPTHER

## 2023-03-22 RX ORDER — NITROFURANTOIN 25; 75 MG/1; MG/1
100 CAPSULE ORAL 2 TIMES DAILY
Qty: 20 CAPSULE | Refills: 0 | Status: SHIPPED | OUTPATIENT
Start: 2023-03-22 | End: 2023-04-01

## 2023-03-22 NOTE — PROGRESS NOTES
Please inform patient of results. Keep scheduled appt. 3/27/23 Can discuss more at visit    1. Ua shows concern for uti.  Is she having symptoms? If yes, I can send in antibiotics while waiting for culture results. Encourage fluids. Contact clinic for concerns.     Other labwork within acceptable ranges.

## 2023-03-22 NOTE — TELEPHONE ENCOUNTER
Patient does not want to fill at [facility] anymore. She wants to fill it at [facility] retail pharmacy. Proposed.

## 2023-03-22 NOTE — TELEPHONE ENCOUNTER
DARRICK: Ozempic has discontinued their 1.5 mL pens. They only make a 3mL pen as explained to me by [facility] pharmacist. I went ahead and gave a verbal to switch pt pen quantity to what was available, and kept her same dose and directions as before.  I also contacted to patient and let her know everything was fixed and that her script would be able to be filled at her pharmacy

## 2023-03-22 NOTE — TELEPHONE ENCOUNTER
----- Message from Milind Fink sent at 3/22/2023 12:35 PM CDT -----  Regarding: call back  .Type:  Needs Medical Advice    Who Called: lex  [email or web address]   Would the patient rather a call back or a response via [facility]?   Best Call Back Number: [phone]  Additional Information: pt wants a call back from jamin she said the pharmacy issn't allowing her to  her ozempic pls call back pt

## 2023-03-23 ENCOUNTER — TELEPHONE (OUTPATIENT)
Dept: FAMILY MEDICINE | Facility: CLINIC | Age: 73
End: 2023-03-23
Payer: MEDICARE

## 2023-03-23 LAB — BACTERIA UR CULT: ABNORMAL

## 2023-03-23 NOTE — PROGRESS NOTES
Please inform patient of results.    1. Final urine culture shows infection sensitive to macrobid which was sent in. Take as directed. Encourage fluids. Contact clinic for concerns

## 2023-03-23 NOTE — TELEPHONE ENCOUNTER
----- Message from April Leo sent at 3/23/2023  8:54 AM CDT -----  Regarding: med verification  .Type:  Needs Medical Advice    Who Called: Sly ([facility])  Symptoms (please be specific): medication instructions need to be verified.   How long has patient had these symptoms:    Pharmacy name and phone #:    Would the patient rather a call back or a response via [facility]? Call back  Best Call Back Number:  [phone]  Additional Information: Sly contacted in regards to the administration instructions for semaglutide (OZEMPIC) 0.25 mg or 0.5 mg(2 mg/1.5 mL) pen injector.

## 2023-03-23 NOTE — TELEPHONE ENCOUNTER
I spoke with pharmacist, Aleks, he said patient had a pen to be used at home but was out of pen needles. So she did give her some needles and will keep our refill request with correct dose instructions (inject 0.25 mg every 7 days) on file for when patient does need refill.

## 2023-03-24 ENCOUNTER — OFFICE VISIT (OUTPATIENT)
Dept: FAMILY MEDICINE | Facility: CLINIC | Age: 73
End: 2023-03-24
Payer: MEDICARE

## 2023-03-24 DIAGNOSIS — B37.31 VAGINAL CANDIDIASIS: ICD-10-CM

## 2023-03-24 DIAGNOSIS — M25.571 ACUTE RIGHT ANKLE PAIN: Primary | ICD-10-CM

## 2023-03-24 PROCEDURE — 99213 PR OFFICE/OUTPT VISIT, EST, LEVL III, 20-29 MIN: ICD-10-PCS | Mod: 95,,, | Performed by: NURSE PRACTITIONER

## 2023-03-24 PROCEDURE — 1101F PR PT FALLS ASSESS DOC 0-1 FALLS W/OUT INJ PAST YR: ICD-10-PCS | Mod: CPTII,95,, | Performed by: NURSE PRACTITIONER

## 2023-03-24 PROCEDURE — 1160F RVW MEDS BY RX/DR IN RCRD: CPT | Mod: CPTII,95,, | Performed by: NURSE PRACTITIONER

## 2023-03-24 PROCEDURE — 1101F PT FALLS ASSESS-DOCD LE1/YR: CPT | Mod: CPTII,95,, | Performed by: NURSE PRACTITIONER

## 2023-03-24 PROCEDURE — 3061F PR NEG MICROALBUMINURIA RESULT DOCUMENTED/REVIEW: ICD-10-PCS | Mod: CPTII,95,, | Performed by: NURSE PRACTITIONER

## 2023-03-24 PROCEDURE — 3288F FALL RISK ASSESSMENT DOCD: CPT | Mod: CPTII,95,, | Performed by: NURSE PRACTITIONER

## 2023-03-24 PROCEDURE — 1159F PR MEDICATION LIST DOCUMENTED IN MEDICAL RECORD: ICD-10-PCS | Mod: CPTII,95,, | Performed by: NURSE PRACTITIONER

## 2023-03-24 PROCEDURE — 3066F NEPHROPATHY DOC TX: CPT | Mod: CPTII,95,, | Performed by: NURSE PRACTITIONER

## 2023-03-24 PROCEDURE — 1159F MED LIST DOCD IN RCRD: CPT | Mod: CPTII,95,, | Performed by: NURSE PRACTITIONER

## 2023-03-24 PROCEDURE — 99213 OFFICE O/P EST LOW 20 MIN: CPT | Mod: 95,,, | Performed by: NURSE PRACTITIONER

## 2023-03-24 PROCEDURE — 3061F NEG MICROALBUMINURIA REV: CPT | Mod: CPTII,95,, | Performed by: NURSE PRACTITIONER

## 2023-03-24 PROCEDURE — 3288F PR FALLS RISK ASSESSMENT DOCUMENTED: ICD-10-PCS | Mod: CPTII,95,, | Performed by: NURSE PRACTITIONER

## 2023-03-24 PROCEDURE — 1160F PR REVIEW ALL MEDS BY PRESCRIBER/CLIN PHARMACIST DOCUMENTED: ICD-10-PCS | Mod: CPTII,95,, | Performed by: NURSE PRACTITIONER

## 2023-03-24 PROCEDURE — 3066F PR DOCUMENTATION OF TREATMENT FOR NEPHROPATHY: ICD-10-PCS | Mod: CPTII,95,, | Performed by: NURSE PRACTITIONER

## 2023-03-24 RX ORDER — ESOMEPRAZOLE MAGNESIUM 40 MG/1
40 CAPSULE, DELAYED RELEASE ORAL
COMMUNITY
Start: 2023-03-14 | End: 2023-06-13

## 2023-03-24 RX ORDER — ESTRADIOL 1 MG/1
1 TABLET ORAL
COMMUNITY
Start: 2023-03-14 | End: 2023-06-13

## 2023-03-24 RX ORDER — BUPROPION HYDROCHLORIDE 100 MG/1
100 TABLET, FILM COATED ORAL 2 TIMES DAILY
COMMUNITY
Start: 2023-03-15 | End: 2023-11-03 | Stop reason: SDUPTHER

## 2023-03-24 RX ORDER — FLUCONAZOLE 150 MG/1
150 TABLET ORAL DAILY
Qty: 1 TABLET | Refills: 0 | Status: SHIPPED | OUTPATIENT
Start: 2023-03-24 | End: 2023-03-25

## 2023-03-24 NOTE — ASSESSMENT & PLAN NOTE
Current antibiotic use for UTI  Rx Diflucan  Keep appt with PCP on 3/27 for follow up  Verbalizes understanding

## 2023-03-24 NOTE — PROGRESS NOTES
Subjective:      Patient ID: Claire Ang is a 73 y.o. White female      Chief Complaint: Follow-up (Pain in right lower extremity near ankle. States first started 2 days ago. Took OTC aspirin said there was no relief. Denies injury. Pt also requesting diflucan states she has vaginal discharge and itching )     This is a telemedicine note. Patient was treated using telemedicine, real-time audio and video. I, distant provider, conducted the visit from location identified below. The patient participated in the visit at a non- [facility] location selected by the patient identified below. I am licensed in the state where the patient stated they are located. The patient stated that they understood and accepted the privacy and security risks to their information at their location.  Patient was located at home.  I, distant provider, was located at [facility]    Past Medical History:   Diagnosis Date    Anxiety     Diabetes mellitus     Fibromyalgia     Gastric ulcer     Gout, unspecified     Hypothyroidism     Nephrolithiasis     Onychomycosis     Osteoporosis     Stage 3b chronic kidney disease         HPI  Presents to clinic for evaluation of ankle.    C/O of right ankle pain x 2 days.  Denies any injuries. Denies any erythema or edema.  Took OTC aspirin said there was no relief.     C/O of vaginal itching x 1-2 days.   Associated with vaginal discharge.  She is currently on antibiotics for UTI.  She would like treatment for yeast infection.  Denies any other problems.        Review of Systems   Constitutional:  Negative for activity change and unexpected weight change.   HENT:  Negative for hearing loss, rhinorrhea and trouble swallowing.    Eyes:  Negative for discharge and visual disturbance.   Respiratory:  Negative for chest tightness and wheezing.    Cardiovascular:  Negative for chest pain and palpitations.   Gastrointestinal:  Positive for constipation. Negative for blood in stool, diarrhea and  vomiting.   Endocrine: Negative for polydipsia and polyuria.   Genitourinary:  Positive for vaginal discharge. Negative for difficulty urinating, dysuria, hematuria and menstrual problem.        Vaginal itching   Musculoskeletal:  Positive for arthralgias. Negative for joint swelling and neck pain.   Neurological:  Negative for weakness and headaches.   Psychiatric/Behavioral:  Negative for confusion and dysphoric mood.         Objective:      There were no vitals filed for this visit.   There is no height or weight on file to calculate BMI.     Physical Exam  Constitutional:       Appearance: Normal appearance.   HENT:      Head: Normocephalic.   Pulmonary:      Effort: No respiratory distress.   Neurological:      Mental Status: She is alert and oriented to person, place, and time.   Psychiatric:         Behavior: Behavior normal.          Current Outpatient Medications:     amLODIPine (NORVASC) 2.5 MG tablet, Take 2.5 mg by mouth., Disp: , Rfl:     cetirizine (ZYRTEC) 5 MG tablet, Take 5 mg by mouth daily as needed., Disp: , Rfl:     colchicine (COLCRYS) 0.6 mg tablet, Take 2 tablets po once. then take 1 tablet one hour later. Repeat dose in 3 days, Disp: 6 tablet, Rfl: 0    cyanocobalamin, vitamin B-12, 1,000 mcg Subl, Take by mouth once daily., Disp: , Rfl:     esomeprazole (NEXIUM) 40 MG capsule, Take 40 mg by mouth., Disp: , Rfl:     estradioL (ESTRACE) 1 MG tablet, Take 1 mg by mouth., Disp: , Rfl:     ezetimibe (ZETIA) 10 mg tablet, Take 1 tablet (10 mg total) by mouth every evening., Disp: 90 tablet, Rfl: 3    FREESTYLE PAULO 14 DAY SENSOR Kit, USE TO MONITOR BLOOD SUGAR (DX E11.9), Disp: 2 kit, Rfl: 11    mupirocin (BACTROBAN) 2 % ointment, Apply topically 3 (three) times daily., Disp: 30 g, Rfl: 11    nitrofurantoin, macrocrystal-monohydrate, (MACROBID) 100 MG capsule, Take 1 capsule (100 mg total) by mouth 2 (two) times daily. for 10 days, Disp: 20 capsule, Rfl: 0    pantoprazole (PROTONIX) 40 MG  tablet, Take 40 mg by mouth every morning., Disp: , Rfl:     semaglutide (OZEMPIC) 0.25 mg or 0.5 mg(2 mg/1.5 mL) pen injector, Inject 1 mg into the skin every 7 days., Disp: 2 pen, Rfl: 11    semaglutide 0.25 mg or 0.5 mg (2 mg/3 mL) PnIj, Inject 0.25 mg into the skin once a week. Inject 1 mg into skin every 7 days., Disp: 3 mL, Rfl: 11    SYNTHROID 75 mcg tablet, Take 1 tablet (75 mcg total) by mouth once daily., Disp: 90 tablet, Rfl: 2    valACYclovir (VALTREX) 1000 MG tablet, Take by mouth as needed., Disp: , Rfl:     valsartan-hydrochlorothiazide (DIOVAN-HCT) 320-25 mg per tablet, Take 1 tablet by mouth once daily., Disp: , Rfl:     WELLBUTRIN  mg TBSR 12 hr tablet, Take 100 mg by mouth 2 (two) times daily., Disp: , Rfl:     Assessment & Plan:     Problem List Items Addressed This Visit          Renal/    Vaginal candidiasis     Current antibiotic use for UTI  Rx Diflucan  Keep appt with PCP on 3/27 for follow up  Verbalizes understanding            Orthopedic    Acute right ankle pain - Primary     Hx CKD; Unable to prescribe NSAID  OTC Tylenol prn cough  Instructed to obtain ankle brace  ICE therapy prn  Keep appt. with PCP on 3/27 for follow up; XR may be needed  Verbalizes understanding

## 2023-03-24 NOTE — ASSESSMENT & PLAN NOTE
Hx CKD; Unable to prescribe NSAID  OTC Tylenol prn cough  Instructed to obtain ankle brace  ICE therapy prn  Keep appt. with PCP on 3/27 for follow up; XR may be needed  Verbalizes understanding

## 2023-03-27 ENCOUNTER — OFFICE VISIT (OUTPATIENT)
Dept: FAMILY MEDICINE | Facility: CLINIC | Age: 73
End: 2023-03-27
Payer: MEDICARE

## 2023-03-27 ENCOUNTER — OUTPATIENT CASE MANAGEMENT (OUTPATIENT)
Dept: ADMINISTRATIVE | Facility: OTHER | Age: 73
End: 2023-03-27
Payer: MEDICARE

## 2023-03-27 VITALS
RESPIRATION RATE: 16 BRPM | BODY MASS INDEX: 27.52 KG/M2 | SYSTOLIC BLOOD PRESSURE: 128 MMHG | HEART RATE: 66 BPM | DIASTOLIC BLOOD PRESSURE: 84 MMHG | HEIGHT: 64 IN | TEMPERATURE: 99 F | OXYGEN SATURATION: 97 % | WEIGHT: 161.19 LBS

## 2023-03-27 DIAGNOSIS — I15.2 HYPERTENSION ASSOCIATED WITH DIABETES: ICD-10-CM

## 2023-03-27 DIAGNOSIS — N30.20 CHRONIC CYSTITIS: ICD-10-CM

## 2023-03-27 DIAGNOSIS — N18.32 STAGE 3B CHRONIC KIDNEY DISEASE: ICD-10-CM

## 2023-03-27 DIAGNOSIS — J43.8 OTHER EMPHYSEMA: ICD-10-CM

## 2023-03-27 DIAGNOSIS — E11.59 HYPERTENSION ASSOCIATED WITH DIABETES: ICD-10-CM

## 2023-03-27 DIAGNOSIS — T46.6X5A MYALGIA DUE TO STATIN: ICD-10-CM

## 2023-03-27 DIAGNOSIS — E11.69 HYPERLIPIDEMIA ASSOCIATED WITH TYPE 2 DIABETES MELLITUS: ICD-10-CM

## 2023-03-27 DIAGNOSIS — F32.A DEPRESSIVE DISORDER: ICD-10-CM

## 2023-03-27 DIAGNOSIS — E78.5 HYPERLIPIDEMIA ASSOCIATED WITH TYPE 2 DIABETES MELLITUS: ICD-10-CM

## 2023-03-27 DIAGNOSIS — M79.10 MYALGIA DUE TO STATIN: ICD-10-CM

## 2023-03-27 DIAGNOSIS — E11.65 TYPE 2 DIABETES MELLITUS WITH HYPERGLYCEMIA, WITHOUT LONG-TERM CURRENT USE OF INSULIN: Primary | ICD-10-CM

## 2023-03-27 DIAGNOSIS — E83.52 HYPERCALCEMIA: ICD-10-CM

## 2023-03-27 DIAGNOSIS — C44.601 MALIGNANT NEOPLASM OF SKIN OF UPPER EXTREMITY, UNSPECIFIED LATERALITY: ICD-10-CM

## 2023-03-27 PROBLEM — R30.0 DYSURIA: Status: RESOLVED | Noted: 2022-07-13 | Resolved: 2023-03-27

## 2023-03-27 PROBLEM — R39.9 UTI SYMPTOMS: Status: RESOLVED | Noted: 2023-02-07 | Resolved: 2023-03-27

## 2023-03-27 PROBLEM — H60.501 ACUTE OTITIS EXTERNA OF RIGHT EAR: Status: RESOLVED | Noted: 2022-10-26 | Resolved: 2023-03-27

## 2023-03-27 PROBLEM — B37.31 VAGINAL CANDIDIASIS: Status: RESOLVED | Noted: 2023-03-24 | Resolved: 2023-03-27

## 2023-03-27 PROBLEM — J06.9 UPPER RESPIRATORY TRACT INFECTION: Status: RESOLVED | Noted: 2022-09-12 | Resolved: 2023-03-27

## 2023-03-27 PROCEDURE — 1160F RVW MEDS BY RX/DR IN RCRD: CPT | Mod: CPTII,,, | Performed by: FAMILY MEDICINE

## 2023-03-27 PROCEDURE — 1160F PR REVIEW ALL MEDS BY PRESCRIBER/CLIN PHARMACIST DOCUMENTED: ICD-10-PCS | Mod: CPTII,,, | Performed by: FAMILY MEDICINE

## 2023-03-27 PROCEDURE — 3074F PR MOST RECENT SYSTOLIC BLOOD PRESSURE < 130 MM HG: ICD-10-PCS | Mod: CPTII,,, | Performed by: FAMILY MEDICINE

## 2023-03-27 PROCEDURE — 3066F PR DOCUMENTATION OF TREATMENT FOR NEPHROPATHY: ICD-10-PCS | Mod: CPTII,,, | Performed by: FAMILY MEDICINE

## 2023-03-27 PROCEDURE — 1125F AMNT PAIN NOTED PAIN PRSNT: CPT | Mod: CPTII,,, | Performed by: FAMILY MEDICINE

## 2023-03-27 PROCEDURE — 3079F PR MOST RECENT DIASTOLIC BLOOD PRESSURE 80-89 MM HG: ICD-10-PCS | Mod: CPTII,,, | Performed by: FAMILY MEDICINE

## 2023-03-27 PROCEDURE — 1159F PR MEDICATION LIST DOCUMENTED IN MEDICAL RECORD: ICD-10-PCS | Mod: CPTII,,, | Performed by: FAMILY MEDICINE

## 2023-03-27 PROCEDURE — 1101F PT FALLS ASSESS-DOCD LE1/YR: CPT | Mod: CPTII,,, | Performed by: FAMILY MEDICINE

## 2023-03-27 PROCEDURE — 3288F FALL RISK ASSESSMENT DOCD: CPT | Mod: CPTII,,, | Performed by: FAMILY MEDICINE

## 2023-03-27 PROCEDURE — 99214 OFFICE O/P EST MOD 30 MIN: CPT | Mod: ,,, | Performed by: FAMILY MEDICINE

## 2023-03-27 PROCEDURE — 3074F SYST BP LT 130 MM HG: CPT | Mod: CPTII,,, | Performed by: FAMILY MEDICINE

## 2023-03-27 PROCEDURE — 3008F BODY MASS INDEX DOCD: CPT | Mod: CPTII,,, | Performed by: FAMILY MEDICINE

## 2023-03-27 PROCEDURE — 3066F NEPHROPATHY DOC TX: CPT | Mod: CPTII,,, | Performed by: FAMILY MEDICINE

## 2023-03-27 PROCEDURE — 3061F PR NEG MICROALBUMINURIA RESULT DOCUMENTED/REVIEW: ICD-10-PCS | Mod: CPTII,,, | Performed by: FAMILY MEDICINE

## 2023-03-27 PROCEDURE — 99214 PR OFFICE/OUTPT VISIT, EST, LEVL IV, 30-39 MIN: ICD-10-PCS | Mod: ,,, | Performed by: FAMILY MEDICINE

## 2023-03-27 PROCEDURE — 3061F NEG MICROALBUMINURIA REV: CPT | Mod: CPTII,,, | Performed by: FAMILY MEDICINE

## 2023-03-27 PROCEDURE — 3288F PR FALLS RISK ASSESSMENT DOCUMENTED: ICD-10-PCS | Mod: CPTII,,, | Performed by: FAMILY MEDICINE

## 2023-03-27 PROCEDURE — 3008F PR BODY MASS INDEX (BMI) DOCUMENTED: ICD-10-PCS | Mod: CPTII,,, | Performed by: FAMILY MEDICINE

## 2023-03-27 PROCEDURE — 1101F PR PT FALLS ASSESS DOC 0-1 FALLS W/OUT INJ PAST YR: ICD-10-PCS | Mod: CPTII,,, | Performed by: FAMILY MEDICINE

## 2023-03-27 PROCEDURE — 3079F DIAST BP 80-89 MM HG: CPT | Mod: CPTII,,, | Performed by: FAMILY MEDICINE

## 2023-03-27 PROCEDURE — 1159F MED LIST DOCD IN RCRD: CPT | Mod: CPTII,,, | Performed by: FAMILY MEDICINE

## 2023-03-27 PROCEDURE — 1125F PR PAIN SEVERITY QUANTIFIED, PAIN PRESENT: ICD-10-PCS | Mod: CPTII,,, | Performed by: FAMILY MEDICINE

## 2023-03-27 NOTE — LETTER
March 27, 2023    Claire Ang  [address]  [facility] [address]             [facility] [address]  [facility] [address] I am writing from the Outpatient Care Management Department at [facility].  I received a referral from Dr. Jany Magana to contact you regarding any needs you may have.  I have been unable to reach you by phone.   Please contact me if you would like to discuss any needs.     I can be reached at [phone]  Monday thru Thursday from 8:00am to 4:30pm, Friday 8am to 12noon.  [facility] also has a program with a nurse available 24/7 to answer questions or provide medical advice.  [facility] can be reached at [phone].     Sincerely,   Mona Berumen RN

## 2023-03-27 NOTE — PROGRESS NOTES
"04/06/23 Closing OPCM case. Unable to reach. Notified PCP staff via message. STEVEN Berumen RN    4/5/2023 3rd attempt to complete Initial Assessment  for Outpatient Care Management, left message.  STEVEN Berumen RN    3/31/2023 2nd attempt to complete Initial Assessment  for Outpatient Care Management. Reached recording "the mailbox is full and cannot accept messages at this time, please try your call again later." STEVEN Berumen RN    3/27/2023 1st attempt to complete Initial Assessment  for Outpatient Care Management, left message.  Will mail unable to assess letter.     STEVEN Berumen RN    "

## 2023-03-27 NOTE — ASSESSMENT & PLAN NOTE
Ct abd from 3/2023 at [facility] ER reviewed- showed emphysematous changes of visualized lung.  Denies symptoms. never smoker.  cxr ordered.

## 2023-03-27 NOTE — PROGRESS NOTES
"Subjective:        Patient ID: Claire Ang is a 73 y.o. female.    Chief Complaint: Follow-up (Follow up )      Patient presents to the clinic unaccompanied for follow up.  She is due for her wellness visit in february.       Her calcium is elevated. Not taking calcium supplement. Sometimes takes tums. Will get labs for hyperparathyroidism.  Went to [facility] ER 3/2/23. Due to vomiting. Got IV fluids.   Cmp there calcium was 10.2.   had ct abd done as well which also reported emphysematous changed at lung bases.  Denies sob. Never smoker.  Brother- lung cancer (dx 69). Another brother-  from lung disease (not cancer) at age 78. Dad had asbestosis. Mom dx age 65 from "oat" cell carcinoma.  Mom was smoker. No other relatives smoked.     She also c/o gout.  Recent attack in her right heel.  Not on preventative meds.     She presented to the wellness clinic on May 18, 2022.  Due to her weakness and hypotension and abdominal pain she was referred to the emergency room.  Labs and CT scan were done showing cystitis/ UTI.  The patient was given Cipro, Bentyl, Zofran and tramadol and discharged home.  She has not had to take the Bentyl or tramadol or Zofran.  Reports frequent utis.  Used to see dr. Christopher.  Would like to see another urologist.  Recent urine shows infection and is on macrobid.       She has acid reflux and reports compliance with her PPI and H2 blocker.  Previously she saw Dr. Tavera and is requesting to establish with a new Gastro, Dr. Peng.  Her last colonoscopy was with Dr. tavera 1/16/15  Which showed external hemorrhoids and sigmoid diverticulosis      she has diabetes.  She reports compliance with her medications.  Her last A1c was 7.4 2023. Her last foot exam was today.  Her ophthalmologist is Dr. Burr who told her that she had the beginnings of macular degeneration and was referred to Dr. Vick whom she saw on .   She was previously on metformin but this was stopped after " her labs of 4/2021 showed elevated creatinine.  This was replaced with glipizide but made her nauseous so she stopped. she is currently on glimepiride.     She has hypertension and hyperlipidemia.  Her cardiologist is Dr. Daigle.  She is not taking statin due to knee pain.  On zetia      She has hypothyroidism and is on Synthroid name brand.     She has anxiety and depression and follows with Psychiatry, Dr. Aguirre.  She is on Wellbutrin.       She has ckd.  Does not see renal. Would like a referral.     she has a history of breast cancer and had a right mastectomy.  She is followed by Dr. Tsang.  has appt 8/2023.  She was on tamoxifen.  She wears a prosthetic. mammogram 1/2022 was normal. Mmg 7/2022 was also normal. Has not had breast mri.  ordered. She has had a complete hysterectomy at age 37 her heavy menses and therefore no longer does Pap smears     She has osteoporosis and was on Prolia.  She takes vitamin-D.  Her last bone density test was 8/2021     she has a personal history of skin cancer.  Her dermatologist is Dr. Trujillo. Has seen him recently.  Asking for refill of her retin-a.  Has tube with her.     She is allergic to sulfa, hydrocodone, meperidine and morphine    She had left cataract surgery with dr. Zapien 2/2023    Got covid 11/2022 while on cruise. Took ivermectin. Is better.     Review of Systems   Constitutional: Negative.    HENT: Negative.     Respiratory: Negative.     Cardiovascular: Negative.    Gastrointestinal: Negative.    Genitourinary: Negative.        Review of patient's allergies indicates:   Allergen Reactions    Hydrocodone bitartrate      Other reaction(s): GI Intolerance  Other reaction(s): GI Intolerance  Acetaminophen hydrocodone bitartrate  Acetaminophen hydrocodone bitartrate      Sulfamethoxazole-trimethoprim      Other reaction(s): stomach pain  Other reaction(s): stomach pain      Hydrocodone-acetaminophen Nausea And Vomiting     Other reaction(s): Visual  "hallucinations  Other reaction(s): Visual hallucinations      Meperidine Nausea And Vomiting     Other reaction(s): GI Intolerance, Visual hallucinations  Other reaction(s): GI Intolerance, Visual hallucinations    Other reaction(s): Vomitting    Morphine Palpitations     Other reaction(s): chest tightness  Other reaction(s): chest tightness        Vitals:    03/27/23 1040   BP: 128/84   BP Location: Right arm   Pulse: 66   Resp: 16   Temp: 98.6 °F (37 °C)   TempSrc: Temporal   SpO2: 97%   Weight: 73.1 kg (161 lb 3.2 oz)   Height: 5' 4" (1.626 m)      Social History     Socioeconomic History    Marital status:    Tobacco Use    Smoking status: Never    Smokeless tobacco: Never   Substance and Sexual Activity    Alcohol use: Not Currently    Drug use: Never   Social History Narrative    ** Merged History Encounter **           History reviewed. No pertinent family history.       Objective:     Physical Exam  Vitals and nursing note reviewed.   Constitutional:       Appearance: Normal appearance. She is normal weight.   HENT:      Head: Normocephalic and atraumatic.      Nose: Nose normal.      Mouth/Throat:      Mouth: Mucous membranes are moist.      Pharynx: Oropharynx is clear.   Eyes:      Extraocular Movements: Extraocular movements intact.   Cardiovascular:      Rate and Rhythm: Normal rate and regular rhythm.      Pulses: Normal pulses.      Heart sounds: Normal heart sounds.   Pulmonary:      Effort: Pulmonary effort is normal.      Breath sounds: Normal breath sounds.   Musculoskeletal:         General: Normal range of motion.      Cervical back: Normal range of motion.   Skin:     General: Skin is warm and dry.   Neurological:      General: No focal deficit present.      Mental Status: She is alert and oriented to person, place, and time. Mental status is at baseline.   Psychiatric:         Mood and Affect: Mood normal.     Current Outpatient Medications on File Prior to Visit   Medication Sig " Dispense Refill    amLODIPine (NORVASC) 2.5 MG tablet Take 2.5 mg by mouth.      cetirizine (ZYRTEC) 5 MG tablet Take 5 mg by mouth daily as needed.      colchicine (COLCRYS) 0.6 mg tablet Take 2 tablets po once. then take 1 tablet one hour later. Repeat dose in 3 days 6 tablet 0    cyanocobalamin, vitamin B-12, 1,000 mcg Subl Take by mouth once daily.      esomeprazole (NEXIUM) 40 MG capsule Take 40 mg by mouth.      estradioL (ESTRACE) 1 MG tablet Take 1 mg by mouth.      ezetimibe (ZETIA) 10 mg tablet Take 1 tablet (10 mg total) by mouth every evening. 90 tablet 3    FREESTYLE PAULO 14 DAY SENSOR Kit USE TO MONITOR BLOOD SUGAR (DX E11.9) 2 kit 11    mupirocin (BACTROBAN) 2 % ointment Apply topically 3 (three) times daily. 30 g 11    nitrofurantoin, macrocrystal-monohydrate, (MACROBID) 100 MG capsule Take 1 capsule (100 mg total) by mouth 2 (two) times daily. for 10 days 20 capsule 0    pantoprazole (PROTONIX) 40 MG tablet Take 40 mg by mouth every morning.      semaglutide 0.25 mg or 0.5 mg (2 mg/3 mL) PnIj Inject 0.25 mg into the skin once a week. Inject 1 mg into skin every 7 days. 3 mL 11    SYNTHROID 75 mcg tablet Take 1 tablet (75 mcg total) by mouth once daily. 90 tablet 2    valACYclovir (VALTREX) 1000 MG tablet Take by mouth as needed.      valsartan-hydrochlorothiazide (DIOVAN-HCT) 320-25 mg per tablet Take 1 tablet by mouth once daily.      WELLBUTRIN  mg TBSR 12 hr tablet Take 100 mg by mouth 2 (two) times daily.      [DISCONTINUED] semaglutide (OZEMPIC) 0.25 mg or 0.5 mg(2 mg/1.5 mL) pen injector Inject 1 mg into the skin every 7 days. 2 pen 11     No current facility-administered medications on file prior to visit.     Health Maintenance   Topic Date Due    TETANUS VACCINE  02/07/2024 (Originally 9/28/2017)    Hemoglobin A1c  07/03/2023    Mammogram  07/21/2023    DEXA Scan  08/19/2023    Eye Exam  09/07/2023    Lipid Panel  01/03/2024    Foot Exam  02/07/2024    Hepatitis C Screening   Completed      Results for orders placed or performed in visit on 03/21/23   Urine Culture High Risk    Specimen: Urine, Clean Catch   Result Value Ref Range    Urine Culture >/= 100,000 colonies/ml Escherichia coli (A)        Susceptibility    Escherichia coli -  (no method available)     Amox/K Clav 8 Sensitive      Ampicillin >=32 Resistant      Cefepime <=0.12 Sensitive      Ceftriaxone <=0.25 Sensitive      Cefuroxime 4 Sensitive      Ciprofloxacin >=4 Resistant      Gentamicin <=1 Sensitive      Levofloxacin >=8 Resistant      Meropenem <=0.25 Sensitive      Nitrofurantoin 32 Sensitive      Piperacillin/Tazobactam <=4 Sensitive      Trimethoprim/Sulfamethoxazole >=320 Resistant      Tetracycline >=16 Resistant      Tobramycin <=1 Sensitive    Urinalysis, Reflex to Urine Culture    Specimen: Urine   Result Value Ref Range    Color, UA Straw Yellow, Light-Yellow, Dark Yellow, Reina, Straw    Appearance, UA Hazy (A) Clear    Specific Gravity, UA 1.020     pH, UA 5.5 5.0 - 8.5    Protein, UA Negative Negative mg/dL    Glucose, UA Negative Negative, Normal mg/dL    Ketones, UA Negative Negative mg/dL    Blood, UA Moderate (A) Negative unit/L    Bilirubin, UA Negative Negative mg/dL    Urobilinogen, UA 0.2 0.2, 1.0, Normal mg/dL    Nitrites, UA Positive (A) Negative    Leukocyte Esterase, UA Small (A) Negative unit/L   Urinalysis, Microscopic   Result Value Ref Range    Bacteria, UA Many (A) None Seen, Rare, Occasional /HPF    Mucous, UA Moderate (A) None Seen /LPF    RBC, UA 11-20 (A) None Seen, 0-2, 3-5, 0-5 /HPF    WBC, UA 11-20 (A) None Seen, 0-2, 3-5, 0-5 /HPF    Squamous Epithelial Cells, UA Few (A) None Seen, Rare, Occasional, Occ /HPF          Assessment & Plan:     Active Problem List with Overview Notes    Diagnosis Date Noted    Hypercalcemia 03/27/2023    Other emphysema 03/27/2023    Acute right ankle pain 03/24/2023    Pre-op exam 02/27/2023    Advanced care planning/counseling discussion 02/07/2023     Gout 02/07/2023    Myalgia due to statin 02/07/2023    Stage 3b chronic kidney disease     Chronic cystitis 10/26/2022    Hyperlipidemia associated with type 2 diabetes mellitus 08/26/2022    Hypertension associated with diabetes 08/26/2022    Postmenopausal 08/26/2022    History of skin cancer 08/26/2022    Malignant neoplasm of central portion of right breast in female, estrogen receptor positive 08/05/2022    Acute pain of left shoulder 07/13/2022    Diabetes mellitus 05/26/2022    Hypothyroidism 05/26/2022    Anxiety 05/26/2022    Depressive disorder 05/26/2022    History of breast cancer 05/26/2022    History of antineoplastic chemotherapy 05/26/2022    Wellness examination 05/26/2022    Gastroesophageal reflux disease 05/26/2022    Genital herpes simplex 05/26/2022    Malignant neoplasm of skin of upper extremity 05/26/2022    Age-related osteoporosis without current pathological fracture 04/05/2022    Osteoporosis 01/11/2017       1. Type 2 diabetes mellitus with hyperglycemia, without long-term current use of insulin  Assessment & Plan:  Lab Results   Component Value Date    HGBA1C 7.4 (H) 01/03/2023     Was having hypoglycemic episodes on glimepiride. Wanted to change to ozempic.      Contact clinic for concerns.     Unable to tolerate statin.  On arb    Foot exam 2/2023  Eye exam with dr. estrella chavez 1/2023    Encouraged compliance with dmii diet.     Orders:  -     Ambulatory referral/consult to Outpatient Case Management  -     Hemoglobin A1C; Future; Expected date: 03/27/2023    2. Hyperlipidemia associated with type 2 diabetes mellitus  Assessment & Plan:  On zetia.   Statin Intolerant  Keep appts with cards      3. Myalgia due to statin  Assessment & Plan:  Unable to tolerate statin. On zetia. Keep appts with cards      4. Hypertension associated with diabetes  Assessment & Plan:  BP at goal  Continue current prescription meds. Keep appts with cards      5. Stage 3b chronic kidney  disease  Assessment & Plan:  Encourage fluids. Monitor. Does not see renal. Will place referral.     Orders:  -     Ambulatory referral/consult to Nephrology; Future; Expected date: 04/03/2023    6. Hypercalcemia  Assessment & Plan:  Will get labs to rule out hyperparathyroidism.  Orders in. Avoid calcium supplements/tums    Orders:  -     Comprehensive Metabolic Panel; Future; Expected date: 03/27/2023  -     PTH, Intact; Future; Expected date: 03/27/2023  -     Calcium, Ionized; Future; Expected date: 03/27/2023  -     Vitamin D; Future; Expected date: 03/27/2023    7. Depressive disorder  Assessment & Plan:  Stable on current prescription meds. Keep appointments with psych    Orders:  -     Ambulatory referral/consult to Outpatient Case Management    8. Chronic cystitis  Assessment & Plan:  uti currently on macrobid. Encourage fluids. Monitor symptoms.  Will place referral to urology    Orders:  -     Ambulatory referral/consult to Urology; Future; Expected date: 04/03/2023    9. Other emphysema  Assessment & Plan:  Ct abd from 3/2023 at [facility] ER reviewed- showed emphysematous changes of visualized lung.  Denies symptoms. never smoker.  cxr ordered.     Orders:  -     X-Ray Chest PA And Lateral; Future; Expected date: 03/27/2023    10. Malignant neoplasm of skin of upper extremity, unspecified laterality  Assessment & Plan:  Asking for refill of her retinoid.  Has tube with her.  Keep appts with derm    Orders:  -     tretinoin, emollient, (RENOVA) 0.02 % Crea; Apply 1 application topically once daily.  Dispense: 40 g; Refill: 0         Follow up in about 6 weeks (around 5/8/2023) for diabetes with labs.

## 2023-03-27 NOTE — ASSESSMENT & PLAN NOTE
Lab Results   Component Value Date    HGBA1C 7.4 (H) 01/03/2023     Was having hypoglycemic episodes on glimepiride. Wanted to change to ozempic.      Contact clinic for concerns.     Unable to tolerate statin.  On arb    Foot exam 2/2023  Eye exam with dr. de la rosa   Urine micro 1/2023    Encouraged compliance with dmii diet.

## 2023-03-29 ENCOUNTER — HOSPITAL ENCOUNTER (OUTPATIENT)
Dept: RADIOLOGY | Facility: HOSPITAL | Age: 73
Discharge: HOME | End: 2023-03-29
Attending: FAMILY MEDICINE
Payer: MEDICARE

## 2023-03-29 DIAGNOSIS — J43.8 OTHER EMPHYSEMA: ICD-10-CM

## 2023-03-29 PROCEDURE — 71046 X-RAY EXAM CHEST 2 VIEWS: CPT | Mod: TC

## 2023-03-29 NOTE — PROGRESS NOTES
"Please inform patient of results.    1.  Cxr shows no acute findings in the chest.  Radiologist does comment that there is "hyperinflation" which could be assoc with emphysema.  We can consider getting PFTs.  If she is not having symptoms (sob, cough), it is not necessary at this time"

## 2023-03-31 ENCOUNTER — TELEPHONE (OUTPATIENT)
Dept: FAMILY MEDICINE | Facility: CLINIC | Age: 73
End: 2023-03-31
Payer: MEDICARE

## 2023-03-31 NOTE — TELEPHONE ENCOUNTER
----- Message from Misti Burton sent at 3/31/2023  8:22 AM CDT -----  Regarding: Returning call  Type:  Patient Returning Call    Who Called:Claire  Who Left Message for Patient:Vani  Does the patient know what this is regarding?:  Would the patient rather a call back or a response via [facility]? Call back  Best Call Back Number:[phone]  Additional Information:

## 2023-03-31 NOTE — TELEPHONE ENCOUNTER
Attempted to call pt back. Went straight to voicemail and her mailbox is not set up    [Postmenopausal] : postmenopausal

## 2023-04-10 RX ORDER — FLASH GLUCOSE SENSOR
KIT MISCELLANEOUS
Qty: 2 KIT | Refills: 11 | Status: SHIPPED | OUTPATIENT
Start: 2023-04-10 | End: 2023-07-14 | Stop reason: SDUPTHER

## 2023-04-10 RX ORDER — LEVOTHYROXINE SODIUM 75 UG/1
75 TABLET ORAL DAILY
Qty: 90 TABLET | Refills: 2 | Status: SHIPPED | OUTPATIENT
Start: 2023-04-10 | End: 2023-07-14 | Stop reason: SDUPTHER

## 2023-05-01 DIAGNOSIS — N18.32 STAGE 3B CHRONIC KIDNEY DISEASE: Primary | ICD-10-CM

## 2023-05-02 ENCOUNTER — APPOINTMENT (OUTPATIENT)
Dept: RADIOLOGY | Facility: HOSPITAL | Age: 73
End: 2023-05-02
Attending: FAMILY MEDICINE
Payer: MEDICARE

## 2023-05-02 DIAGNOSIS — Z85.3 HISTORY OF BREAST CANCER: ICD-10-CM

## 2023-05-02 LAB
CREAT SERPL-MCNC: 1.1 MG/DL (ref 0.5–1.4)
SAMPLE: NORMAL

## 2023-05-02 PROCEDURE — 77049 MRI BREAST C-+ W/CAD BI: CPT | Mod: 26,,, | Performed by: RADIOLOGY

## 2023-05-02 PROCEDURE — A9577 INJ MULTIHANCE: HCPCS | Performed by: FAMILY MEDICINE

## 2023-05-02 PROCEDURE — 25500020 PHARM REV CODE 255: Performed by: FAMILY MEDICINE

## 2023-05-02 PROCEDURE — 77049 MRI BREAST W/WO CONTRAST, W/CAD, BILATERAL: ICD-10-PCS | Mod: 26,,, | Performed by: RADIOLOGY

## 2023-05-02 PROCEDURE — 77049 MRI BREAST C-+ W/CAD BI: CPT | Mod: TC

## 2023-05-02 RX ADMIN — GADOBENATE DIMEGLUMINE 15 ML: 529 INJECTION, SOLUTION INTRAVENOUS at 02:05

## 2023-05-08 ENCOUNTER — TELEPHONE (OUTPATIENT)
Dept: FAMILY MEDICINE | Facility: CLINIC | Age: 73
End: 2023-05-08

## 2023-05-08 NOTE — TELEPHONE ENCOUNTER
Patient would like to cancel her appt scheduled for next week on may 16. She said she would call back to reschedule at a later time

## 2023-05-15 PROBLEM — Z00.00 WELLNESS EXAMINATION: Status: RESOLVED | Noted: 2022-05-26 | Resolved: 2023-05-15

## 2023-05-16 ENCOUNTER — OFFICE VISIT (OUTPATIENT)
Dept: FAMILY MEDICINE | Facility: CLINIC | Age: 73
End: 2023-05-16
Payer: MEDICARE

## 2023-05-16 VITALS
DIASTOLIC BLOOD PRESSURE: 78 MMHG | HEART RATE: 74 BPM | BODY MASS INDEX: 27.04 KG/M2 | SYSTOLIC BLOOD PRESSURE: 132 MMHG | RESPIRATION RATE: 16 BRPM | HEIGHT: 64 IN | OXYGEN SATURATION: 98 % | WEIGHT: 158.38 LBS | TEMPERATURE: 98 F

## 2023-05-16 DIAGNOSIS — E11.59 HYPERTENSION ASSOCIATED WITH DIABETES: ICD-10-CM

## 2023-05-16 DIAGNOSIS — J43.8 OTHER EMPHYSEMA: ICD-10-CM

## 2023-05-16 DIAGNOSIS — N18.32 STAGE 3B CHRONIC KIDNEY DISEASE: ICD-10-CM

## 2023-05-16 DIAGNOSIS — E78.5 HYPERLIPIDEMIA ASSOCIATED WITH TYPE 2 DIABETES MELLITUS: ICD-10-CM

## 2023-05-16 DIAGNOSIS — I15.2 HYPERTENSION ASSOCIATED WITH DIABETES: ICD-10-CM

## 2023-05-16 DIAGNOSIS — F32.A DEPRESSIVE DISORDER: ICD-10-CM

## 2023-05-16 DIAGNOSIS — E11.69 HYPERLIPIDEMIA ASSOCIATED WITH TYPE 2 DIABETES MELLITUS: ICD-10-CM

## 2023-05-16 DIAGNOSIS — E11.65 TYPE 2 DIABETES MELLITUS WITH HYPERGLYCEMIA, WITHOUT LONG-TERM CURRENT USE OF INSULIN: Primary | ICD-10-CM

## 2023-05-16 PROBLEM — M25.571 ACUTE RIGHT ANKLE PAIN: Status: RESOLVED | Noted: 2023-03-24 | Resolved: 2023-05-16

## 2023-05-16 PROBLEM — Z01.818 PRE-OP EXAM: Status: RESOLVED | Noted: 2023-02-27 | Resolved: 2023-05-16

## 2023-05-16 PROCEDURE — 3066F PR DOCUMENTATION OF TREATMENT FOR NEPHROPATHY: ICD-10-PCS | Mod: CPTII,,, | Performed by: FAMILY MEDICINE

## 2023-05-16 PROCEDURE — 1160F RVW MEDS BY RX/DR IN RCRD: CPT | Mod: CPTII,,, | Performed by: FAMILY MEDICINE

## 2023-05-16 PROCEDURE — 3288F PR FALLS RISK ASSESSMENT DOCUMENTED: ICD-10-PCS | Mod: CPTII,,, | Performed by: FAMILY MEDICINE

## 2023-05-16 PROCEDURE — 3066F NEPHROPATHY DOC TX: CPT | Mod: CPTII,,, | Performed by: FAMILY MEDICINE

## 2023-05-16 PROCEDURE — 3008F BODY MASS INDEX DOCD: CPT | Mod: CPTII,,, | Performed by: FAMILY MEDICINE

## 2023-05-16 PROCEDURE — 3078F DIAST BP <80 MM HG: CPT | Mod: CPTII,,, | Performed by: FAMILY MEDICINE

## 2023-05-16 PROCEDURE — 3008F PR BODY MASS INDEX (BMI) DOCUMENTED: ICD-10-PCS | Mod: CPTII,,, | Performed by: FAMILY MEDICINE

## 2023-05-16 PROCEDURE — 1126F PR PAIN SEVERITY QUANTIFIED, NO PAIN PRESENT: ICD-10-PCS | Mod: CPTII,,, | Performed by: FAMILY MEDICINE

## 2023-05-16 PROCEDURE — 1101F PT FALLS ASSESS-DOCD LE1/YR: CPT | Mod: CPTII,,, | Performed by: FAMILY MEDICINE

## 2023-05-16 PROCEDURE — 3075F SYST BP GE 130 - 139MM HG: CPT | Mod: CPTII,,, | Performed by: FAMILY MEDICINE

## 2023-05-16 PROCEDURE — 1126F AMNT PAIN NOTED NONE PRSNT: CPT | Mod: CPTII,,, | Performed by: FAMILY MEDICINE

## 2023-05-16 PROCEDURE — 1159F MED LIST DOCD IN RCRD: CPT | Mod: CPTII,,, | Performed by: FAMILY MEDICINE

## 2023-05-16 PROCEDURE — 1160F PR REVIEW ALL MEDS BY PRESCRIBER/CLIN PHARMACIST DOCUMENTED: ICD-10-PCS | Mod: CPTII,,, | Performed by: FAMILY MEDICINE

## 2023-05-16 PROCEDURE — 3288F FALL RISK ASSESSMENT DOCD: CPT | Mod: CPTII,,, | Performed by: FAMILY MEDICINE

## 2023-05-16 PROCEDURE — 1101F PR PT FALLS ASSESS DOC 0-1 FALLS W/OUT INJ PAST YR: ICD-10-PCS | Mod: CPTII,,, | Performed by: FAMILY MEDICINE

## 2023-05-16 PROCEDURE — 1159F PR MEDICATION LIST DOCUMENTED IN MEDICAL RECORD: ICD-10-PCS | Mod: CPTII,,, | Performed by: FAMILY MEDICINE

## 2023-05-16 PROCEDURE — 3061F NEG MICROALBUMINURIA REV: CPT | Mod: CPTII,,, | Performed by: FAMILY MEDICINE

## 2023-05-16 PROCEDURE — 99214 PR OFFICE/OUTPT VISIT, EST, LEVL IV, 30-39 MIN: ICD-10-PCS | Mod: ,,, | Performed by: FAMILY MEDICINE

## 2023-05-16 PROCEDURE — 3075F PR MOST RECENT SYSTOLIC BLOOD PRESS GE 130-139MM HG: ICD-10-PCS | Mod: CPTII,,, | Performed by: FAMILY MEDICINE

## 2023-05-16 PROCEDURE — 3061F PR NEG MICROALBUMINURIA RESULT DOCUMENTED/REVIEW: ICD-10-PCS | Mod: CPTII,,, | Performed by: FAMILY MEDICINE

## 2023-05-16 PROCEDURE — 99214 OFFICE O/P EST MOD 30 MIN: CPT | Mod: ,,, | Performed by: FAMILY MEDICINE

## 2023-05-16 PROCEDURE — 3078F PR MOST RECENT DIASTOLIC BLOOD PRESSURE < 80 MM HG: ICD-10-PCS | Mod: CPTII,,, | Performed by: FAMILY MEDICINE

## 2023-05-16 NOTE — ASSESSMENT & PLAN NOTE
Encourage fluids. Monitor.has appt to establish with renal 5/18/23. Reminded pt of appt date and time

## 2023-05-16 NOTE — ASSESSMENT & PLAN NOTE
Lab Results   Component Value Date    HGBA1C 7.3 (H) 03/29/2023     Was having hypoglycemic episodes on glimepiride. Wanted to change to ozempic.      Contact clinic for concerns.     Unable to tolerate statin.  On arb    Foot exam 2/2023  Eye exam with dr. de la rosa   Urine micro 1/2023    Encouraged compliance with dmii diet.

## 2023-05-16 NOTE — PROGRESS NOTES
"Subjective:        Patient ID: Claire Ang is a 73 y.o. female.    Chief Complaint: Follow-up (Follow up. Review recent results. Also has c/o constipation )      Patient presents to the clinic unaccompanied for follow up.  She is due for her wellness visit in february.       Her calcium has been elevated. Not taking calcium supplement. Sometimes takes tums. Pth, vitamin d and ionized calcium 3/2023 were wnl      Went to [facility] ER 3/2/23 for vomiting. Got IV fluids.   Cmp there calcium was 10.2.   had ct abd done as well which also reported emphysematous changed at lung bases.  Denies sob. Never smoker.  Brother- lung cancer (dx 69). Another brother-  from lung disease (not cancer) at age 78. Dad had asbestosis. Mom dx age 65 from "oat" cell carcinoma.  Mom was smoker. No other relatives smoked. Cxr 3/2023 showed hyperinflation.  We will order PFTs     She also c/o gout.  Recent attack in her right heel.  Not on preventative meds.     She presented to the wellness clinic on May 18, 2022.  Due to her weakness and hypotension and abdominal pain she was referred to the emergency room.  Labs and CT scan were done showing cystitis/ UTI.  The patient was given Cipro, Bentyl, Zofran and tramadol and discharged home.  She has not had to take the Bentyl or tramadol or Zofran.  Reports frequent utis.  Used to see dr. Christopher.  Would like to see another urologist.  Recent urine shows infection and is on macrobid.       She has acid reflux and reports compliance with her PPI and H2 blocker.  Previously she saw Dr. Tavera and is requesting to establish with a new Gastro, Dr. Peng.  Her last colonoscopy was with Dr. tavera 1/16/15  Which showed external hemorrhoids and sigmoid diverticulosis      she has diabetes.  She reports compliance with her medications.  Her last A1c was 7.3 3/2023. Her last foot exam was 3/2023.  Her ophthalmologist is Dr. denise who told her that she had the beginnings of macular " degeneration and was referred to Dr. Vick whom she saw on March 25.   She was previously on metformin but this was stopped after her labs of 4/2021 showed elevated creatinine.  This was replaced with glipizide but made her nauseous so she stopped. she is currently on glimepiride.     She has hypertension and hyperlipidemia.  Her cardiologist is Dr. Daigle.  She is not taking statin due to knee pain.  On zetia      She has hypothyroidism and is on Synthroid name brand.     She has anxiety and depression and follows with Psychiatry, Dr. Aguirre.  She is on Wellbutrin.       She has ckd.  Referred to renal. Has appt to establish care on 5/18/23.     she has a history of breast cancer and had a right mastectomy.  She is followed by Dr. Tsang.  has appt 8/2023.  She was on tamoxifen.  She wears a prosthetic. mammogram 1/2022 was normal. Mmg 7/2022 was also normal. Has not had breast mri.  ordered. She has had a complete hysterectomy at age 37 her heavy menses and therefore no longer does Pap smears     She has osteoporosis and was on Prolia.  She takes vitamin-D.  Her last bone density test was 8/2021     she has a personal history of skin cancer.  Her dermatologist is Dr. Trujillo. Has seen him recently.  Asking for refill of her retin-a.  Has tube with her.     She is allergic to sulfa, hydrocodone, meperidine and morphine     She had left cataract surgery with dr. Zapien 2/2023     Got covid 11/2022 while on cruise. Took ivermectin. Is better.    Review of Systems   Constitutional: Negative.    HENT: Negative.     Respiratory: Negative.     Cardiovascular: Negative.    Gastrointestinal: Negative.    Genitourinary: Negative.        Review of patient's allergies indicates:   Allergen Reactions    Hydrocodone bitartrate      Other reaction(s): GI Intolerance  Other reaction(s): GI Intolerance  Acetaminophen hydrocodone bitartrate  Acetaminophen hydrocodone bitartrate      Sulfamethoxazole-trimethoprim      Other  "reaction(s): stomach pain  Other reaction(s): stomach pain      Hydrocodone-acetaminophen Nausea And Vomiting     Other reaction(s): Visual hallucinations  Other reaction(s): Visual hallucinations      Meperidine Nausea And Vomiting     Other reaction(s): GI Intolerance, Visual hallucinations  Other reaction(s): GI Intolerance, Visual hallucinations    Other reaction(s): Vomitting    Morphine Palpitations     Other reaction(s): chest tightness  Other reaction(s): chest tightness        Vitals:    05/16/23 0939   BP: 132/78   BP Location: Left arm   Pulse: 74   Resp: 16   Temp: 97.9 °F (36.6 °C)   TempSrc: Temporal   SpO2: 98%   Weight: 71.8 kg (158 lb 6.4 oz)   Height: 5' 4" (1.626 m)      Social History     Socioeconomic History    Marital status:    Tobacco Use    Smoking status: Never    Smokeless tobacco: Never   Substance and Sexual Activity    Alcohol use: Not Currently    Drug use: Never   Social History Narrative    ** Merged History Encounter **           History reviewed. No pertinent family history.       Objective:     Physical Exam  Vitals and nursing note reviewed.   Constitutional:       Appearance: Normal appearance. She is normal weight.   HENT:      Head: Normocephalic and atraumatic.      Nose: Nose normal.      Mouth/Throat:      Mouth: Mucous membranes are moist.      Pharynx: Oropharynx is clear.   Eyes:      Extraocular Movements: Extraocular movements intact.   Cardiovascular:      Rate and Rhythm: Normal rate and regular rhythm.      Pulses: Normal pulses.      Heart sounds: Normal heart sounds.   Pulmonary:      Effort: Pulmonary effort is normal.      Breath sounds: Normal breath sounds.   Musculoskeletal:         General: Normal range of motion.      Cervical back: Normal range of motion.   Skin:     General: Skin is warm and dry.   Neurological:      General: No focal deficit present.      Mental Status: She is alert and oriented to person, place, and time. Mental status is at " baseline.   Psychiatric:         Mood and Affect: Mood normal.     Current Outpatient Medications on File Prior to Visit   Medication Sig Dispense Refill    amLODIPine (NORVASC) 2.5 MG tablet Take 2.5 mg by mouth.      cetirizine (ZYRTEC) 5 MG tablet Take 5 mg by mouth daily as needed.      colchicine (COLCRYS) 0.6 mg tablet Take 2 tablets po once. then take 1 tablet one hour later. Repeat dose in 3 days 6 tablet 0    cyanocobalamin, vitamin B-12, 1,000 mcg Subl Take by mouth once daily.      esomeprazole (NEXIUM) 40 MG capsule Take 40 mg by mouth.      estradioL (ESTRACE) 1 MG tablet Take 1 mg by mouth.      ezetimibe (ZETIA) 10 mg tablet Take 1 tablet (10 mg total) by mouth every evening. 90 tablet 3    FREESTYLE PAULO 14 DAY SENSOR Kit USE TO MONITOR BLOOD SUGAR (DX E11.9) 2 kit 11    mupirocin (BACTROBAN) 2 % ointment Apply topically 3 (three) times daily. 30 g 11    pantoprazole (PROTONIX) 40 MG tablet Take 40 mg by mouth every morning.      semaglutide 0.25 mg or 0.5 mg (2 mg/3 mL) PnIj Inject 0.25 mg into the skin once a week. Inject 1 mg into skin every 7 days. 3 mL 11    SYNTHROID 75 mcg tablet Take 1 tablet (75 mcg total) by mouth once daily. 90 tablet 2    tretinoin, emollient, (RENOVA) 0.02 % Crea Apply 1 application topically once daily. 40 g 0    valACYclovir (VALTREX) 1000 MG tablet Take by mouth as needed.      valsartan-hydrochlorothiazide (DIOVAN-HCT) 320-25 mg per tablet Take 1 tablet by mouth once daily.      WELLBUTRIN  mg TBSR 12 hr tablet Take 100 mg by mouth 2 (two) times daily.       No current facility-administered medications on file prior to visit.     Health Maintenance   Topic Date Due    TETANUS VACCINE  02/07/2024 (Originally 9/28/2017)    Mammogram  07/21/2023    DEXA Scan  08/19/2023    Eye Exam  09/07/2023    Hemoglobin A1c  09/29/2023    Lipid Panel  01/03/2024    Foot Exam  02/07/2024    Hepatitis C Screening  Completed      Results for orders placed or performed in visit  on 05/02/23   ISTAT CREATININE   Result Value Ref Range    POC Creatinine 1.1 0.5 - 1.4 mg/dL    Sample unknown           Assessment & Plan:     Active Problem List with Overview Notes    Diagnosis Date Noted    Hypercalcemia 03/27/2023    Other emphysema 03/27/2023    Advanced care planning/counseling discussion 02/07/2023    Gout 02/07/2023    Myalgia due to statin 02/07/2023    Stage 3b chronic kidney disease     Chronic cystitis 10/26/2022    Hyperlipidemia associated with type 2 diabetes mellitus 08/26/2022    Hypertension associated with diabetes 08/26/2022    Postmenopausal 08/26/2022    History of skin cancer 08/26/2022    Malignant neoplasm of central portion of right breast in female, estrogen receptor positive 08/05/2022    Acute pain of left shoulder 07/13/2022    Diabetes mellitus 05/26/2022    Hypothyroidism 05/26/2022    Anxiety 05/26/2022    Depressive disorder 05/26/2022    History of breast cancer 05/26/2022    History of antineoplastic chemotherapy 05/26/2022    Wellness examination 05/26/2022    Gastroesophageal reflux disease 05/26/2022    Genital herpes simplex 05/26/2022    Malignant neoplasm of skin of upper extremity 05/26/2022    Age-related osteoporosis without current pathological fracture 04/05/2022    Osteoporosis 01/11/2017       1. Type 2 diabetes mellitus with hyperglycemia, without long-term current use of insulin  Assessment & Plan:  Lab Results   Component Value Date    HGBA1C 7.3 (H) 03/29/2023     Was having hypoglycemic episodes on glimepiride. Wanted to change to ozempic.      Contact clinic for concerns.     Unable to tolerate statin.  On arb    Foot exam 2/2023  Eye exam with dr. de la rosa   Urine micro 1/2023    Encouraged compliance with dmii diet.     Orders:  -     Comprehensive Metabolic Panel; Future; Expected date: 08/16/2023  -     Hemoglobin A1C; Future; Expected date: 08/16/2023    2. Hyperlipidemia associated with type 2 diabetes mellitus  Assessment & Plan:  On  zetia.   Statin Intolerant  Keep appts with cards    Orders:  -     Comprehensive Metabolic Panel; Future; Expected date: 08/16/2023  -     Hemoglobin A1C; Future; Expected date: 08/16/2023    3. Hypertension associated with diabetes  Assessment & Plan:  BP at goal  Continue current prescription meds. Keep appts with cards    Orders:  -     Comprehensive Metabolic Panel; Future; Expected date: 08/16/2023  -     Hemoglobin A1C; Future; Expected date: 08/16/2023    4. Other emphysema  -     Complete PFT w/ bronchodilator; Future  -     Comprehensive Metabolic Panel; Future; Expected date: 08/16/2023  -     Hemoglobin A1C; Future; Expected date: 08/16/2023    5. Stage 3b chronic kidney disease  Assessment & Plan:  Encourage fluids. Monitor.has appt to establish with renal 5/18/23. Reminded pt of appt date and time    Orders:  -     Comprehensive Metabolic Panel; Future; Expected date: 08/16/2023  -     Hemoglobin A1C; Future; Expected date: 08/16/2023    6. Depressive disorder  Assessment & Plan:  Stable on current prescription meds. Keep appointments with psych    Orders:  -     Comprehensive Metabolic Panel; Future; Expected date: 08/16/2023  -     Hemoglobin A1C; Future; Expected date: 08/16/2023         Follow up in about 3 months (around 8/16/2023) for diabetes with labs.

## 2023-05-18 ENCOUNTER — LAB VISIT (OUTPATIENT)
Dept: LAB | Facility: HOSPITAL | Age: 73
End: 2023-05-18
Attending: INTERNAL MEDICINE
Payer: MEDICARE

## 2023-05-18 DIAGNOSIS — N18.32 STAGE 3B CHRONIC KIDNEY DISEASE: ICD-10-CM

## 2023-05-18 LAB
APPEARANCE UR: ABNORMAL
BACTERIA #/AREA URNS AUTO: ABNORMAL /HPF
BILIRUB UR QL STRIP.AUTO: NEGATIVE MG/DL
COLOR UR: YELLOW
CREAT UR-MCNC: 140.3 MG/DL (ref 47–110)
GLUCOSE UR QL STRIP.AUTO: NEGATIVE MG/DL
KETONES UR QL STRIP.AUTO: NEGATIVE MG/DL
LEUKOCYTE ESTERASE UR QL STRIP.AUTO: ABNORMAL UNIT/L
NITRITE UR QL STRIP.AUTO: POSITIVE
PH UR STRIP.AUTO: 5.5 [PH]
PROT UR QL STRIP.AUTO: NEGATIVE MG/DL
PROT UR STRIP-MCNC: 8.4 MG/DL
RBC #/AREA URNS AUTO: ABNORMAL /HPF
RBC UR QL AUTO: ABNORMAL UNIT/L
SP GR UR STRIP.AUTO: 1.02
SQUAMOUS #/AREA URNS AUTO: ABNORMAL /HPF
UROBILINOGEN UR STRIP-ACNC: 0.2 MG/DL
WBC #/AREA URNS AUTO: ABNORMAL /HPF

## 2023-05-18 PROCEDURE — 82570 ASSAY OF URINE CREATININE: CPT

## 2023-05-18 PROCEDURE — 81001 URINALYSIS AUTO W/SCOPE: CPT

## 2023-05-18 PROCEDURE — 87088 URINE BACTERIA CULTURE: CPT

## 2023-05-18 PROCEDURE — 87186 SC STD MICRODIL/AGAR DIL: CPT

## 2023-05-18 PROCEDURE — 84156 ASSAY OF PROTEIN URINE: CPT

## 2023-05-20 LAB — BACTERIA UR CULT: ABNORMAL

## 2023-06-06 RX ORDER — CITALOPRAM 40 MG/1
TABLET, FILM COATED ORAL
COMMUNITY
Start: 2023-05-16 | End: 2023-06-13

## 2023-06-06 RX ORDER — CLONAZEPAM 0.5 MG/1
TABLET ORAL
COMMUNITY
Start: 2023-05-16 | End: 2023-06-13

## 2023-06-09 DIAGNOSIS — E11.65 TYPE 2 DIABETES MELLITUS WITH HYPERGLYCEMIA, WITHOUT LONG-TERM CURRENT USE OF INSULIN: ICD-10-CM

## 2023-06-13 ENCOUNTER — OFFICE VISIT (OUTPATIENT)
Dept: NEPHROLOGY | Facility: CLINIC | Age: 73
End: 2023-06-13
Payer: MEDICARE

## 2023-06-13 VITALS
TEMPERATURE: 98 F | SYSTOLIC BLOOD PRESSURE: 120 MMHG | DIASTOLIC BLOOD PRESSURE: 66 MMHG | WEIGHT: 155.88 LBS | BODY MASS INDEX: 26.61 KG/M2 | HEART RATE: 85 BPM | RESPIRATION RATE: 20 BRPM | OXYGEN SATURATION: 95 % | HEIGHT: 64 IN

## 2023-06-13 DIAGNOSIS — E83.52 HYPERCALCEMIA: ICD-10-CM

## 2023-06-13 DIAGNOSIS — N18.32 STAGE 3B CHRONIC KIDNEY DISEASE: Primary | ICD-10-CM

## 2023-06-13 DIAGNOSIS — E11.59 HYPERTENSION ASSOCIATED WITH DIABETES: ICD-10-CM

## 2023-06-13 DIAGNOSIS — I15.2 HYPERTENSION ASSOCIATED WITH DIABETES: ICD-10-CM

## 2023-06-13 DIAGNOSIS — E11.22 TYPE 2 DIABETES MELLITUS WITH STAGE 3B CHRONIC KIDNEY DISEASE, WITHOUT LONG-TERM CURRENT USE OF INSULIN: ICD-10-CM

## 2023-06-13 DIAGNOSIS — N18.32 TYPE 2 DIABETES MELLITUS WITH STAGE 3B CHRONIC KIDNEY DISEASE, WITHOUT LONG-TERM CURRENT USE OF INSULIN: ICD-10-CM

## 2023-06-13 DIAGNOSIS — R31.9 HEMATURIA SYNDROME: Primary | ICD-10-CM

## 2023-06-13 DIAGNOSIS — N39.0 UTI (URINARY TRACT INFECTION), UNCOMPLICATED: ICD-10-CM

## 2023-06-13 PROCEDURE — 3061F NEG MICROALBUMINURIA REV: CPT | Mod: CPTII,S$GLB,,

## 2023-06-13 PROCEDURE — 3074F PR MOST RECENT SYSTOLIC BLOOD PRESSURE < 130 MM HG: ICD-10-PCS | Mod: CPTII,S$GLB,,

## 2023-06-13 PROCEDURE — 1126F PR PAIN SEVERITY QUANTIFIED, NO PAIN PRESENT: ICD-10-PCS | Mod: CPTII,S$GLB,,

## 2023-06-13 PROCEDURE — 99204 OFFICE O/P NEW MOD 45 MIN: CPT | Mod: S$GLB,,,

## 2023-06-13 PROCEDURE — 3066F NEPHROPATHY DOC TX: CPT | Mod: CPTII,S$GLB,,

## 2023-06-13 PROCEDURE — 3078F PR MOST RECENT DIASTOLIC BLOOD PRESSURE < 80 MM HG: ICD-10-PCS | Mod: CPTII,S$GLB,,

## 2023-06-13 PROCEDURE — 99204 PR OFFICE/OUTPT VISIT, NEW, LEVL IV, 45-59 MIN: ICD-10-PCS | Mod: S$GLB,,,

## 2023-06-13 PROCEDURE — 1159F PR MEDICATION LIST DOCUMENTED IN MEDICAL RECORD: ICD-10-PCS | Mod: CPTII,S$GLB,,

## 2023-06-13 PROCEDURE — 1126F AMNT PAIN NOTED NONE PRSNT: CPT | Mod: CPTII,S$GLB,,

## 2023-06-13 PROCEDURE — 3061F PR NEG MICROALBUMINURIA RESULT DOCUMENTED/REVIEW: ICD-10-PCS | Mod: CPTII,S$GLB,,

## 2023-06-13 PROCEDURE — 1101F PR PT FALLS ASSESS DOC 0-1 FALLS W/OUT INJ PAST YR: ICD-10-PCS | Mod: CPTII,S$GLB,,

## 2023-06-13 PROCEDURE — 1159F MED LIST DOCD IN RCRD: CPT | Mod: CPTII,S$GLB,,

## 2023-06-13 PROCEDURE — 99999 PR PBB SHADOW E&M-EST. PATIENT-LVL IV: ICD-10-PCS | Mod: PBBFAC,,,

## 2023-06-13 PROCEDURE — 3288F FALL RISK ASSESSMENT DOCD: CPT | Mod: CPTII,S$GLB,,

## 2023-06-13 PROCEDURE — 3078F DIAST BP <80 MM HG: CPT | Mod: CPTII,S$GLB,,

## 2023-06-13 PROCEDURE — 3288F PR FALLS RISK ASSESSMENT DOCUMENTED: ICD-10-PCS | Mod: CPTII,S$GLB,,

## 2023-06-13 PROCEDURE — 3008F BODY MASS INDEX DOCD: CPT | Mod: CPTII,S$GLB,,

## 2023-06-13 PROCEDURE — 1101F PT FALLS ASSESS-DOCD LE1/YR: CPT | Mod: CPTII,S$GLB,,

## 2023-06-13 PROCEDURE — 99999 PR PBB SHADOW E&M-EST. PATIENT-LVL IV: CPT | Mod: PBBFAC,,,

## 2023-06-13 PROCEDURE — 3066F PR DOCUMENTATION OF TREATMENT FOR NEPHROPATHY: ICD-10-PCS | Mod: CPTII,S$GLB,,

## 2023-06-13 PROCEDURE — 3008F PR BODY MASS INDEX (BMI) DOCUMENTED: ICD-10-PCS | Mod: CPTII,S$GLB,,

## 2023-06-13 PROCEDURE — 3074F SYST BP LT 130 MM HG: CPT | Mod: CPTII,S$GLB,,

## 2023-06-13 NOTE — PROGRESS NOTES
[facility] New Referral Office Note    HPI  Claire Ang, 73 y.o. female, presents to office as a new patient for CKD 3B.  Patient's history significant for diabetes and hypertension.  She has only been diabetic for about 2 years and hypertensive for about 5 years. It appears that her creatinine has ranged from 1.2-1.6 since 2019, more so elevated since 2021.  She is also recently noted to have hypercalcemia (up to 10.6).  PTH and ionized calcium within normal limits.  Patient is maintained on Diovan which has a component of HCTZ.  She has a history of frequent UTIs but no longer sees Urology.  She has a history of breast cancer with most recent scans negative for recurrence.  She was followed by oncology Dr. Jasmyne Tsang, however she plans on going back to [facility] for future followups.  Overall she feels good.  She does have some current right flank pain.  Noted her ultrasound in 2020 demonstrated possible nephrolithiasis.  CT scan in February 2023 was negative for nephrolithiasis.  She also has persistent hematuria on urinalysis.      Medical Diagnoses:   Past Medical History:   Diagnosis Date    Anxiety     Diabetes mellitus     Fibromyalgia     Gastric ulcer     Hypothyroidism     Nephrolithiasis     Onychomycosis     Osteoporosis     Stage 3b chronic kidney disease      Patient Active Problem List   Diagnosis    Age-related osteoporosis without current pathological fracture    Diabetes mellitus    Hypothyroidism    Anxiety    Depressive disorder    History of breast cancer    History of antineoplastic chemotherapy    Wellness examination    Gastroesophageal reflux disease    Genital herpes simplex    Malignant neoplasm of skin of upper extremity    Osteoporosis    Acute pain of left shoulder    Malignant neoplasm of central portion of right breast in female, estrogen receptor positive    Hyperlipidemia associated with type 2 diabetes mellitus    Hypertension associated with diabetes     Postmenopausal    History of skin cancer    Chronic cystitis    Advanced care planning/counseling discussion    Stage 3b chronic kidney disease    Gout    Myalgia due to statin    Hypercalcemia    Other emphysema       Surgical History:   Past Surgical History:   Procedure Laterality Date    APPENDECTOMY      CHOLECYSTECTOMY      COLONOSCOPY  01/16/2015    Rakesh Henriquez MD    HYSTERECTOMY      MASTECTOMY Right     PHACOEMULSIFICATION, CATARACT, WITH IOL INSERTION Left 2/28/2023    Procedure: PHACOEMULSIFICATION, CATARACT, WITH IOL INSERTION- OS;  Surgeon: Stefanie Zapien MD;  Location: [facility];  Service: Ophthalmology;  Laterality: Left;    rt kidney stent      UPPER GASTROINTESTINAL ENDOSCOPY  07/12/2022       Family History:   History reviewed. No pertinent family history.    Social History:   Social History     Tobacco Use    Smoking status: Never     Passive exposure: Never    Smokeless tobacco: Never   Substance Use Topics    Alcohol use: Not Currently       Allergies:  Review of patient's allergies indicates:   Allergen Reactions    Hydrocodone bitartrate      Other reaction(s): GI Intolerance  Other reaction(s): GI Intolerance  Acetaminophen hydrocodone bitartrate  Acetaminophen hydrocodone bitartrate      Sulfamethoxazole-trimethoprim      Other reaction(s): stomach pain  Other reaction(s): stomach pain      Hydrocodone-acetaminophen Nausea And Vomiting     Other reaction(s): Visual hallucinations  Other reaction(s): Visual hallucinations      Meperidine Nausea And Vomiting     Other reaction(s): GI Intolerance, Visual hallucinations  Other reaction(s): GI Intolerance, Visual hallucinations    Other reaction(s): Vomitting    Morphine Palpitations     Other reaction(s): chest tightness  Other reaction(s): chest tightness         Medications:    Current Outpatient Medications:     cetirizine (ZYRTEC) 5 MG tablet, Take 5 mg by mouth daily as needed., Disp: , Rfl:     cyanocobalamin, vitamin B-12, 1,000  "mcg Subl, Take by mouth once daily., Disp: , Rfl:     ezetimibe (ZETIA) 10 mg tablet, Take 1 tablet (10 mg total) by mouth every evening., Disp: 90 tablet, Rfl: 3    FREESTYLE PAULO 14 DAY SENSOR Kit, USE TO MONITOR BLOOD SUGAR (DX E11.9), Disp: 2 kit, Rfl: 11    pantoprazole (PROTONIX) 40 MG tablet, Take 40 mg by mouth every morning., Disp: , Rfl:     semaglutide (OZEMPIC) 0.25 mg or 0.5 mg (2 mg/3 mL) pen injector, Inject 0.25 mg into the skin once a week. Inject 1 mg into skin every 7 days., Disp: 3 mL, Rfl: 1    SYNTHROID 75 mcg tablet, Take 1 tablet (75 mcg total) by mouth once daily., Disp: 90 tablet, Rfl: 2    tretinoin, emollient, (RENOVA) 0.02 % Crea, Apply 1 application topically once daily., Disp: 40 g, Rfl: 0    valACYclovir (VALTREX) 1000 MG tablet, Take by mouth as needed., Disp: , Rfl:     valsartan-hydrochlorothiazide (DIOVAN-HCT) 320-25 mg per tablet, Take 1 tablet by mouth once daily., Disp: , Rfl:     WELLBUTRIN  mg TBSR 12 hr tablet, Take 100 mg by mouth 2 (two) times daily., Disp: , Rfl:        Review of Systems:    Constitutional: Denies fever, fatigue, generalized weakness  Skin: Denies wounds, no rashes, no itching, no new skin lesions  Respiratory:  Denies cough, shortness of breath, or wheezing  Cardiovascular: Denies chest pain, palpitations, or swelling  Gastrointestional: Denies abdominal pain, nausea, vomiting, diarrhea, or constipation  Genitourinary: + frequent UTIs  Musculoskeletal: + lower back pain; + right flank pain  Neurological: Denies headaches, dizziness, paresthesias, tremors or focal weakness      Vital Signs:  /66 (BP Location: Left arm, Patient Position: Sitting)   Pulse 85   Temp 97.8 °F (36.6 °C) (Temporal)   Resp 20   Ht 5' 4" (1.626 m)   Wt 70.7 kg (155 lb 13.8 oz)   SpO2 95%   BMI 26.75 kg/m²   Body mass index is 26.75 kg/m².      Physical Exam:    General: no acute distress, awake, alert  Eyes: PERRLA, conjunctiva clear, eyelids without " swelling  HENT: atraumatic, oropharynx and nasal mucosa patent  Neck: supple, trache midline, full ROM, no JVD  Respiratory: equal, unlabored, clear to auscultation A/P  Cardiovascular: RRR  Edema: none  Gastrointestinal: soft, non-tender, non-distended; positive bowel sounds; no masses to palpation; no ascites  Genitourinary: + right CVA mild soreness  Musculoskeletal: ROM without new limitation or discomfort  Integumentary: warm, dry; no rashes, wounds, or skin lesions  Neurological: oriented x4, appropriate, no acute deficits; no asterixis      Labs:        Component Value Date/Time     05/18/2023 1210     03/29/2023 1601    K 3.8 05/18/2023 1210    K 3.6 03/29/2023 1601    CO2 27 05/18/2023 1210    CO2 26 03/29/2023 1601    BUN 19.3 05/18/2023 1210    BUN 19.0 03/29/2023 1601    CREATININE 1.23 (H) 05/18/2023 1210    CREATININE 1.16 (H) 03/29/2023 1601    CREATININE 1.44 (H) 01/03/2023 1435    CREATININE 1.18 (H) 12/02/2022 0920    CALCIUM 10.0 05/18/2023 1210    CALCIUM 10.4 (H) 03/29/2023 1601    PHOS 3.1 05/18/2023 1210    PTH 56.9 03/29/2023 1601           Component Value Date/Time    WBC 8.70 05/18/2023 1210    WBC 11.3 01/03/2023 1435    HGB 11.7 (L) 05/18/2023 1210    HGB 12.3 01/03/2023 1435    HCT 34.9 (L) 05/18/2023 1210    HCT 37.4 01/03/2023 1435    HCT 37.0 (L) 06/22/2021 0921     05/18/2023 1210     01/03/2023 1435         Imaging:  Retroperitoneal US:     IMPRESSION: Calcifications in the right kidney which might indicate  nephrolithiasis.     Slight contour irregularity of the left kidney might be related to  scar.     No other abnormalities      Date/Time Signed: 12/04/2020 09:25      Impression:    1. Stage 3b chronic kidney disease  Ambulatory referral/consult to Nephrology    Most likely related to nephrosclerosis.  Patient has also had systemic chemotherapy in the past.  Admits to taking NSAIDs over-the-counter over the years. Renal function within her baseline over  the last 2-4 years.  Elevations in creatinine this year most likely due to component of hypercalcemia, now trending down.  We will screen her for monoclonal gammopathy.    Retroperitoneal ultrasound completed in 2020 demonstrated possible nephrolithiasis.    Main line of management is control of blood pressure and diabetes as well as refraining from NSAIDs.  Stay well hydrated and prevent dehydration.    Proteinuria nonsignificant at 0.06 g/24 hours, therefore serological secondary causes of CKD are not of concern at this time      2. Hypercalcemia  Check SPEP.  If calcium continues to be elevated may need to discontinue HCTZ portion of Diovan.  Advised refraining from Tums and high calcium diet.  Hydrate really well with water.  Refrain from dark drinks.  Advised Pepcid for indigestion.      3. Type 2 diabetes mellitus with stage 3b chronic kidney disease, without long-term current use of insulin  Maintained on Ozempic.  Most recent A1c 7.3      4. Hypertension associated with diabetes  Maintained on Norvasc and Diovan  Blood pressure at target            Plan:  Follow up in 8 weeks with labs in 6 weeks to have time for serum immuno electrophoresis to return  Refer to Urology for persistent hematuria, UTIs, and possible nephrolithiasis (patient prefers Dr. Gould)    Patient to call our office with any concerns prior to next appointment.    Avoid NSAIDs (Aleve, Mobic, Celebrex, Ibuprofen, Advil, Toradol and Diclofenac).  Only take tylenol occasionally if needed for aches/pains.    Derik Tsang, MARC

## 2023-06-26 ENCOUNTER — TELEPHONE (OUTPATIENT)
Dept: FAMILY MEDICINE | Facility: CLINIC | Age: 73
End: 2023-06-26
Payer: MEDICARE

## 2023-06-26 NOTE — TELEPHONE ENCOUNTER
----- Message from Lashonda Pereyra sent at 6/26/2023  3:20 PM CDT -----  Regarding: FW: Referral    ----- Message -----  From: Heidy Easton LPN  Sent: 6/26/2023   1:33 PM CDT  To: Lashonda Pereyra  Subject: FW: Referral                                       ----- Message -----  From: Saida Mahmood  Sent: 6/26/2023   1:27 PM CDT  To: [facility]ILL[facility] Staff  Subject: Referral                                         .Type:  Needs Medical Advice    Who Called: [facility]   Symptoms (please be specific):    How long has patient had these symptoms:    Pharmacy name and phone #:    Would the patient rather a call back or a response via [facility]? Call back  Best Call Back Number: [phone]  Additional Information: Unable to get in contact with pt to get her scheduled. Stated several voicemails were left.

## 2023-07-06 ENCOUNTER — TELEPHONE (OUTPATIENT)
Dept: FAMILY MEDICINE | Facility: CLINIC | Age: 73
End: 2023-07-06
Payer: MEDICARE

## 2023-07-06 NOTE — TELEPHONE ENCOUNTER
----- Message from Mariposa Snow sent at 7/6/2023 12:27 PM CDT -----  Regarding: med adv  Type:  Needs Medical Advice    Who Called: patient  Symptoms (please be specific): bladder infection   How long has patient had these symptoms:    Pharmacy name and phone #:  [facility] [place], [place]  Would the patient rather a call back or a response via [facility]?   Best Call Back Number: [phone]  Additional Information: patient has a bladder infection and needs the rx called in at the pharmacy listed above. Gwendolyn please call patient back.        Chief Complaint/Referring Provider:  Patient is being seen at the request of Dr. Delta Segura for a consultation for chronic cough     Presenting HPI: Patient is a 62-year-old male who was referred to the office for chronic cough  Patient states that he has a history of broken back for which he had a surgery in the past but somehow he felt that he twisted and he is having a severe pain along with that patient has minimal paresthesias in the right leg and patient had just seen her primary care provider and was given steroids and muscle relaxant to see if that makes a difference or not.   Patient also has history of severe paroxysmal coughing which is happening for last many years starting in the fall time and it goes all through the winter; patient says that he was single for many years so he did not pay attention to the health but he has got remarried and his wife wants him to be evaluated for the cough, patient says in the past years along with the cough he had secretions which were dark in color along with some occasional wheezing but this time the secretions are whitish and patient has not had any significant wheezing at this time, patient says that he has been gargling with Epsom salt and also has been taking vitamin C and honey based drinks and in the last few days/weeks has started feeling better, patient says that he does have some nasal congestion sinus congestion and postnasal drainage, patient does not have any ear pain at this time, patient does not have any hearing loss or any tinnitus, patient does not have a sore throat, no difficulty in swallowing, no coughing or choking or eating, no odynophagia or dysphagia, patient on questioning further states that he does not have any pleuritic chest pain on a regular basis but once in a while he feels that there is a pinch chest pain which improves with posture; patient does not have any significant palpitations or diaphoresis, no fever no chills, patient does not have

## 2023-07-14 DIAGNOSIS — E11.65 TYPE 2 DIABETES MELLITUS WITH HYPERGLYCEMIA, WITHOUT LONG-TERM CURRENT USE OF INSULIN: ICD-10-CM

## 2023-07-14 DIAGNOSIS — E11.69 HYPERLIPIDEMIA ASSOCIATED WITH TYPE 2 DIABETES MELLITUS: ICD-10-CM

## 2023-07-14 DIAGNOSIS — J43.8 OTHER EMPHYSEMA: Primary | ICD-10-CM

## 2023-07-14 DIAGNOSIS — E78.5 HYPERLIPIDEMIA ASSOCIATED WITH TYPE 2 DIABETES MELLITUS: ICD-10-CM

## 2023-07-14 RX ORDER — EZETIMIBE 10 MG/1
10 TABLET ORAL NIGHTLY
Qty: 90 TABLET | Refills: 3 | Status: SHIPPED | OUTPATIENT
Start: 2023-07-14 | End: 2024-07-13

## 2023-07-14 RX ORDER — ATORVASTATIN CALCIUM 20 MG/1
20 TABLET, FILM COATED ORAL
COMMUNITY
Start: 2023-06-14 | End: 2023-07-14 | Stop reason: SDUPTHER

## 2023-07-14 RX ORDER — ATORVASTATIN CALCIUM 20 MG/1
20 TABLET, FILM COATED ORAL DAILY
Qty: 90 TABLET | Refills: 3 | Status: SHIPPED | OUTPATIENT
Start: 2023-07-14 | End: 2023-07-24

## 2023-07-14 RX ORDER — LEVOTHYROXINE SODIUM 75 UG/1
75 TABLET ORAL DAILY
Qty: 90 TABLET | Refills: 2 | Status: SHIPPED | OUTPATIENT
Start: 2023-07-14 | End: 2023-08-15 | Stop reason: SDUPTHER

## 2023-07-14 RX ORDER — FLASH GLUCOSE SENSOR
KIT MISCELLANEOUS
Qty: 2 KIT | Refills: 11 | Status: SHIPPED | OUTPATIENT
Start: 2023-07-14 | End: 2024-03-11 | Stop reason: SDUPTHER

## 2023-07-14 NOTE — TELEPHONE ENCOUNTER
I have signed for the following orders AND/OR meds.  Please call the patient and ask the patient to schedule the testing AND/OR inform about any medications that were sent.      Orders Placed This Encounter   Procedures    Ambulatory referral/consult to Pulmonology     Standing Status:   Future     Standing Expiration Date:   8/14/2024     Referral Priority:   Routine     Referral Type:   Consultation     Referral Reason:   Specialty Services Required     Requested Specialty:   Pulmonary Disease     Number of Visits Requested:   1       Medications Ordered This Encounter   Medications    atorvastatin (LIPITOR) 20 MG tablet     Sig: Take 1 tablet (20 mg total) by mouth once daily.     Dispense:  90 tablet     Refill:  3    ezetimibe (ZETIA) 10 mg tablet     Sig: Take 1 tablet (10 mg total) by mouth every evening.     Dispense:  90 tablet     Refill:  3    FREESTYLE PAULO 14 DAY SENSOR Kit     Sig: USE TO MONITOR BLOOD SUGAR (DX E11.9)     Dispense:  2 kit     Refill:  11    semaglutide (OZEMPIC) 0.25 mg or 0.5 mg (2 mg/3 mL) pen injector     Sig: Inject 0.25 mg into the skin once a week. Inject 1 mg into skin every 7 days.     Dispense:  3 mL     Refill:  1     Inject 0.25 mg into the skin every 7 days    SYNTHROID 75 mcg tablet     Sig: Take 1 tablet (75 mcg total) by mouth once daily.     Dispense:  90 tablet     Refill:  2     Brand Name Only

## 2023-07-14 NOTE — TELEPHONE ENCOUNTER
Patient stopped by the office and dropped off a list of requests for me. She wrote in her letter that Dr. Magana recommended to her last office visit that she should see a pulmonologist. She is requesting a referral. No preferred provider

## 2023-07-15 DIAGNOSIS — E11.65 TYPE 2 DIABETES MELLITUS WITH HYPERGLYCEMIA, WITHOUT LONG-TERM CURRENT USE OF INSULIN: ICD-10-CM

## 2023-07-17 RX ORDER — SEMAGLUTIDE 1.34 MG/ML
1 INJECTION, SOLUTION SUBCUTANEOUS
Qty: 3 ML | Refills: 11 | Status: SHIPPED | OUTPATIENT
Start: 2023-07-17 | End: 2023-07-24 | Stop reason: DRUGHIGH

## 2023-07-17 RX ORDER — SEMAGLUTIDE 0.68 MG/ML
INJECTION, SOLUTION SUBCUTANEOUS
Qty: 3 ML | Refills: 1 | Status: SHIPPED | OUTPATIENT
Start: 2023-07-17 | End: 2023-07-17

## 2023-07-17 NOTE — TELEPHONE ENCOUNTER
I sent in rx for 1mg of ozempic once weekly. Please confirm this is correct dose that patient is currently taking      I have signed for the following orders AND/OR meds.  Please call the patient and ask the patient to schedule the testing AND/OR inform about any medications that were sent.        Medications Ordered This Encounter   Medications    semaglutide (OZEMPIC) 1 mg/dose (4 mg/3 mL)     Sig: Inject 1 mg into the skin every 7 days.     Dispense:  3 mL     Refill:  11

## 2023-07-18 ENCOUNTER — TELEPHONE (OUTPATIENT)
Dept: FAMILY MEDICINE | Facility: CLINIC | Age: 73
End: 2023-07-18
Payer: MEDICARE

## 2023-07-18 DIAGNOSIS — N30.20 CHRONIC CYSTITIS: Primary | ICD-10-CM

## 2023-07-18 NOTE — TELEPHONE ENCOUNTER
----- Message from Saida Mahmood sent at 7/18/2023  8:53 AM CDT -----  Regarding: Referral  .Type:  Needs Medical Advice    Who Called: Pt  Symptoms (please be specific):    How long has patient had these symptoms:    Pharmacy name and phone #:    Would the patient rather a call back or a response via [facility]? Call back  Best Call Back Number: [phone]  Additional Information: Pt requesting that referral be sent out to Dr Chino Morrissey, Urologist.

## 2023-07-18 NOTE — TELEPHONE ENCOUNTER
I have signed for the following orders AND/OR meds.  Please call the patient and ask the patient to schedule the testing AND/OR inform about any medications that were sent.      Orders Placed This Encounter   Procedures    Ambulatory referral/consult to Urology     Standing Status:   Future     Standing Expiration Date:   8/18/2024     Referral Priority:   Routine     Referral Type:   Consultation     Referral Reason:   Specialty Services Required     Referred to Provider:   Raul Moreno MD     Requested Specialty:   Urology     Number of Visits Requested:   1

## 2023-07-21 ENCOUNTER — TELEPHONE (OUTPATIENT)
Dept: FAMILY MEDICINE | Facility: CLINIC | Age: 73
End: 2023-07-21
Payer: MEDICARE

## 2023-07-21 DIAGNOSIS — J43.8 OTHER EMPHYSEMA: Primary | ICD-10-CM

## 2023-07-21 NOTE — TELEPHONE ENCOUNTER
Labs and office note sent to Cardiologist. Pt needs new external referral for pulmonologist. Please advise.

## 2023-07-21 NOTE — TELEPHONE ENCOUNTER
----- Message from Saida Mahmood sent at 7/21/2023  9:41 AM CDT -----  Regarding: referral  ..Type:  Needs Medical Advice    Who Called: pt  Symptoms (please be specific):    How long has patient had these symptoms:    Pharmacy name and phone #:    Would the patient rather a call back or a response via [facility]? Call back  Best Call Back Number: [phone]  Additional Information: Pt states that the pulmonologist Dr Flores can't see her until Jan 2024, and also her cardiologist Dr Vieyra is requesting her BP readings and and labs that could be faxed over.

## 2023-07-21 NOTE — TELEPHONE ENCOUNTER
I have signed for the following orders AND/OR meds.  Please call the patient and ask the patient to schedule the testing AND/OR inform about any medications that were sent.      Orders Placed This Encounter   Procedures    Ambulatory referral/consult to Pulmonology     Standing Status:   Future     Standing Expiration Date:   8/21/2024     Referral Priority:   Routine     Referral Type:   Consultation     Referral Reason:   Specialty Services Required     Requested Specialty:   Pulmonary Disease     Number of Visits Requested:   1

## 2023-07-24 ENCOUNTER — TELEPHONE (OUTPATIENT)
Dept: FAMILY MEDICINE | Facility: CLINIC | Age: 73
End: 2023-07-24
Payer: MEDICARE

## 2023-07-24 DIAGNOSIS — N30.20 CHRONIC CYSTITIS: ICD-10-CM

## 2023-07-24 DIAGNOSIS — E11.22 TYPE 2 DIABETES MELLITUS WITH STAGE 3B CHRONIC KIDNEY DISEASE, WITHOUT LONG-TERM CURRENT USE OF INSULIN: Primary | ICD-10-CM

## 2023-07-24 DIAGNOSIS — N18.32 TYPE 2 DIABETES MELLITUS WITH STAGE 3B CHRONIC KIDNEY DISEASE, WITHOUT LONG-TERM CURRENT USE OF INSULIN: Primary | ICD-10-CM

## 2023-07-24 RX ORDER — SEMAGLUTIDE 0.68 MG/ML
0.5 INJECTION, SOLUTION SUBCUTANEOUS
Qty: 3 ML | Refills: 0 | Status: SHIPPED | OUTPATIENT
Start: 2023-07-24 | End: 2023-07-31 | Stop reason: SDUPTHER

## 2023-07-24 NOTE — TELEPHONE ENCOUNTER
Patient stopped by office for script clarifications. Patient would like to increase to ozempic 0.5 mg since she has been on 0.25 4+ weeks. Pt received 1 mg ozempic in error. I advised to hold on to that script but do not use as we may be able to work up to that dose. Patient said she would like to try 0.5 mg.    Patient would also like to have urology referral sent elsewhere as [facility] urology cannot get her in until 2024. Is ok with traveling outside of [place] as far as [place].

## 2023-07-24 NOTE — TELEPHONE ENCOUNTER
I have signed for the following orders AND/OR meds.  Please call the patient and ask the patient to schedule the testing AND/OR inform about any medications that were sent.      Orders Placed This Encounter   Procedures    Ambulatory referral/consult to Urology     Standing Status:   Future     Standing Expiration Date:   8/24/2024     Referral Priority:   Routine     Referral Type:   Consultation     Referral Reason:   Specialty Services Required     Requested Specialty:   Urology     Number of Visits Requested:   1       Medications Ordered This Encounter   Medications    semaglutide (OZEMPIC) 0.25 mg or 0.5 mg (2 mg/3 mL) pen injector     Sig: Inject 0.5 mg into the skin every 7 days.     Dispense:  3 mL     Refill:  0

## 2023-07-25 ENCOUNTER — LAB VISIT (OUTPATIENT)
Dept: LAB | Facility: HOSPITAL | Age: 73
End: 2023-07-25
Payer: MEDICARE

## 2023-07-25 DIAGNOSIS — N18.32 STAGE 3B CHRONIC KIDNEY DISEASE: ICD-10-CM

## 2023-07-25 DIAGNOSIS — E11.59 HYPERTENSION ASSOCIATED WITH DIABETES: ICD-10-CM

## 2023-07-25 DIAGNOSIS — E83.52 HYPERCALCEMIA: ICD-10-CM

## 2023-07-25 DIAGNOSIS — N18.32 TYPE 2 DIABETES MELLITUS WITH STAGE 3B CHRONIC KIDNEY DISEASE, WITHOUT LONG-TERM CURRENT USE OF INSULIN: ICD-10-CM

## 2023-07-25 DIAGNOSIS — I15.2 HYPERTENSION ASSOCIATED WITH DIABETES: ICD-10-CM

## 2023-07-25 DIAGNOSIS — E11.22 TYPE 2 DIABETES MELLITUS WITH STAGE 3B CHRONIC KIDNEY DISEASE, WITHOUT LONG-TERM CURRENT USE OF INSULIN: ICD-10-CM

## 2023-07-25 LAB
ALBUMIN SERPL-MCNC: 3.9 G/DL (ref 3.4–4.8)
ALBUMIN/GLOB SERPL: 1.1 RATIO (ref 1.1–2)
ALP SERPL-CCNC: 81 UNIT/L (ref 40–150)
ALT SERPL-CCNC: 12 UNIT/L (ref 0–55)
AST SERPL-CCNC: 17 UNIT/L (ref 5–34)
BASOPHILS # BLD AUTO: 0.05 X10(3)/MCL
BASOPHILS NFR BLD AUTO: 0.5 %
BILIRUBIN DIRECT+TOT PNL SERPL-MCNC: 0.6 MG/DL
BUN SERPL-MCNC: 21 MG/DL (ref 9.8–20.1)
CALCIUM SERPL-MCNC: 10 MG/DL (ref 8.4–10.2)
CHLORIDE SERPL-SCNC: 102 MMOL/L (ref 98–107)
CO2 SERPL-SCNC: 25 MMOL/L (ref 23–31)
CREAT SERPL-MCNC: 1.4 MG/DL (ref 0.55–1.02)
EOSINOPHIL # BLD AUTO: 0.16 X10(3)/MCL (ref 0–0.9)
EOSINOPHIL NFR BLD AUTO: 1.7 %
ERYTHROCYTE [DISTWIDTH] IN BLOOD BY AUTOMATED COUNT: 13.6 % (ref 11.5–17)
GFR SERPLBLD CREATININE-BSD FMLA CKD-EPI: 40 MLS/MIN/1.73/M2
GLOBULIN SER-MCNC: 3.7 GM/DL (ref 2.4–3.5)
GLUCOSE SERPL-MCNC: 167 MG/DL (ref 82–115)
HCT VFR BLD AUTO: 39.7 % (ref 37–47)
HGB BLD-MCNC: 13 G/DL (ref 12–16)
IGA SERPL-MCNC: 331 MG/DL (ref 69–517)
IGG SERPL-MCNC: 1225 MG/DL (ref 522–1631)
IGM SERPL-MCNC: 65 MG/DL (ref 33–293)
IMM GRANULOCYTES # BLD AUTO: 0.03 X10(3)/MCL (ref 0–0.04)
IMM GRANULOCYTES NFR BLD AUTO: 0.3 %
LYMPHOCYTES # BLD AUTO: 2.91 X10(3)/MCL (ref 0.6–4.6)
LYMPHOCYTES NFR BLD AUTO: 31.6 %
MCH RBC QN AUTO: 31.6 PG (ref 27–31)
MCHC RBC AUTO-ENTMCNC: 32.7 G/DL (ref 33–36)
MCV RBC AUTO: 96.6 FL (ref 80–94)
MONOCYTES # BLD AUTO: 0.54 X10(3)/MCL (ref 0.1–1.3)
MONOCYTES NFR BLD AUTO: 5.9 %
NEUTROPHILS # BLD AUTO: 5.52 X10(3)/MCL (ref 2.1–9.2)
NEUTROPHILS NFR BLD AUTO: 60 %
NRBC BLD AUTO-RTO: 0 %
PHOSPHATE SERPL-MCNC: 3.1 MG/DL (ref 2.3–4.7)
PLATELET # BLD AUTO: 242 X10(3)/MCL (ref 130–400)
PMV BLD AUTO: 8.7 FL (ref 7.4–10.4)
POTASSIUM SERPL-SCNC: 4.5 MMOL/L (ref 3.5–5.1)
PROT SERPL-MCNC: 7.6 GM/DL (ref 5.8–7.6)
PTH-INTACT SERPL-MCNC: 65.3 PG/ML (ref 8.7–77)
RBC # BLD AUTO: 4.11 X10(6)/MCL (ref 4.2–5.4)
SODIUM SERPL-SCNC: 140 MMOL/L (ref 136–145)
WBC # SPEC AUTO: 9.21 X10(3)/MCL (ref 4.5–11.5)

## 2023-07-25 PROCEDURE — 83970 ASSAY OF PARATHORMONE: CPT

## 2023-07-25 PROCEDURE — 82784 ASSAY IGA/IGD/IGG/IGM EACH: CPT

## 2023-07-25 PROCEDURE — 84100 ASSAY OF PHOSPHORUS: CPT

## 2023-07-25 PROCEDURE — 85025 COMPLETE CBC W/AUTO DIFF WBC: CPT

## 2023-07-25 PROCEDURE — 86334 IMMUNOFIX E-PHORESIS SERUM: CPT

## 2023-07-25 PROCEDURE — 36415 COLL VENOUS BLD VENIPUNCTURE: CPT

## 2023-07-25 PROCEDURE — 83521 IG LIGHT CHAINS FREE EACH: CPT

## 2023-07-25 PROCEDURE — 80053 COMPREHEN METABOLIC PANEL: CPT

## 2023-07-25 PROCEDURE — 84165 PROTEIN E-PHORESIS SERUM: CPT

## 2023-07-26 LAB
KAPPA LC FREE SER-MCNC: 3.6 MG/DL (ref 0.33–1.94)
KAPPA LC FREE/LAMBDA FREE SER: 1.33 {RATIO} (ref 0.26–1.65)
LAMBDA LC FREE SERPL-MCNC: 2.71 MG/DL (ref 0.57–2.63)

## 2023-07-27 ENCOUNTER — TELEPHONE (OUTPATIENT)
Dept: NEPHROLOGY | Facility: CLINIC | Age: 73
End: 2023-07-27
Payer: MEDICARE

## 2023-07-27 DIAGNOSIS — N39.0 UTI (URINARY TRACT INFECTION), UNCOMPLICATED: Primary | ICD-10-CM

## 2023-07-27 LAB
ALBUMIN % SPEP (OHS): 44.91
ALBUMIN SERPL-MCNC: 3.1 G/DL (ref 3.4–4.8)
ALBUMIN/GLOB SERPL: 0.8 RATIO (ref 1.1–2)
ALPHA 1 GLOB (OHS): 0.38 GM/DL
ALPHA 1 GLOB% (OHS): 5.55
ALPHA 2 GLOB % (OHS): 13.66
ALPHA 2 GLOB (OHS): 0.94 GM/DL
BETA GLOB (OHS): 1.26 GM/DL
BETA GLOB% (OHS): 18.33
GAMMA GLOBULIN % (OHS): 17.55
GAMMA GLOBULIN (OHS): 1.21 GM/DL
GLOBULIN SER-MCNC: 3.8 GM/DL (ref 2.4–3.5)
M SPIKE % (OHS): ABNORMAL
M SPIKE (OHS): ABNORMAL
PATH REV: NORMAL
PROT SERPL-MCNC: 6.9 GM/DL (ref 5.8–7.6)

## 2023-07-27 RX ORDER — NITROFURANTOIN 25; 75 MG/1; MG/1
100 CAPSULE ORAL DAILY
Qty: 5 CAPSULE | Refills: 0 | Status: SHIPPED | OUTPATIENT
Start: 2023-07-27 | End: 2023-08-01

## 2023-07-27 NOTE — TELEPHONE ENCOUNTER
Called patient regarding urine test results, patient has a UTI, Dr Robison ordered Macrodantin 100 mg daily times 5 days, sent to [facility] also instructed to follow up with PCP. Verbalized understanding.

## 2023-07-31 DIAGNOSIS — N18.32 TYPE 2 DIABETES MELLITUS WITH STAGE 3B CHRONIC KIDNEY DISEASE, WITHOUT LONG-TERM CURRENT USE OF INSULIN: ICD-10-CM

## 2023-07-31 DIAGNOSIS — E11.22 TYPE 2 DIABETES MELLITUS WITH STAGE 3B CHRONIC KIDNEY DISEASE, WITHOUT LONG-TERM CURRENT USE OF INSULIN: ICD-10-CM

## 2023-07-31 RX ORDER — SEMAGLUTIDE 0.68 MG/ML
0.5 INJECTION, SOLUTION SUBCUTANEOUS
Qty: 3 ML | Refills: 3 | Status: SHIPPED | OUTPATIENT
Start: 2023-07-31 | End: 2023-08-15 | Stop reason: SDUPTHER

## 2023-07-31 NOTE — TELEPHONE ENCOUNTER
Pt did not  ozempic at [facility] retail pharmacy due to cost would like it sent to mailorder pharmacy instead

## 2023-08-08 ENCOUNTER — TELEPHONE (OUTPATIENT)
Dept: NEPHROLOGY | Facility: CLINIC | Age: 73
End: 2023-08-08

## 2023-08-08 ENCOUNTER — OFFICE VISIT (OUTPATIENT)
Dept: NEPHROLOGY | Facility: CLINIC | Age: 73
End: 2023-08-08
Payer: MEDICARE

## 2023-08-08 VITALS
OXYGEN SATURATION: 96 % | RESPIRATION RATE: 20 BRPM | DIASTOLIC BLOOD PRESSURE: 60 MMHG | HEIGHT: 64 IN | HEART RATE: 76 BPM | SYSTOLIC BLOOD PRESSURE: 138 MMHG | WEIGHT: 157.88 LBS | TEMPERATURE: 98 F | BODY MASS INDEX: 26.95 KG/M2

## 2023-08-08 DIAGNOSIS — N18.32 TYPE 2 DIABETES MELLITUS WITH STAGE 3B CHRONIC KIDNEY DISEASE, WITHOUT LONG-TERM CURRENT USE OF INSULIN: ICD-10-CM

## 2023-08-08 DIAGNOSIS — E11.22 TYPE 2 DIABETES MELLITUS WITH STAGE 3B CHRONIC KIDNEY DISEASE, WITHOUT LONG-TERM CURRENT USE OF INSULIN: ICD-10-CM

## 2023-08-08 DIAGNOSIS — N18.32 STAGE 3B CHRONIC KIDNEY DISEASE: Primary | ICD-10-CM

## 2023-08-08 DIAGNOSIS — N39.0 UTI (URINARY TRACT INFECTION), UNCOMPLICATED: ICD-10-CM

## 2023-08-08 DIAGNOSIS — I10 PRIMARY HYPERTENSION: ICD-10-CM

## 2023-08-08 DIAGNOSIS — E83.52 HYPERCALCEMIA: ICD-10-CM

## 2023-08-08 PROCEDURE — 1159F PR MEDICATION LIST DOCUMENTED IN MEDICAL RECORD: ICD-10-PCS | Mod: CPTII,S$GLB,,

## 2023-08-08 PROCEDURE — 99214 PR OFFICE/OUTPT VISIT, EST, LEVL IV, 30-39 MIN: ICD-10-PCS | Mod: S$GLB,,,

## 2023-08-08 PROCEDURE — 3075F PR MOST RECENT SYSTOLIC BLOOD PRESS GE 130-139MM HG: ICD-10-PCS | Mod: CPTII,S$GLB,,

## 2023-08-08 PROCEDURE — 3288F FALL RISK ASSESSMENT DOCD: CPT | Mod: CPTII,S$GLB,,

## 2023-08-08 PROCEDURE — 1126F AMNT PAIN NOTED NONE PRSNT: CPT | Mod: CPTII,S$GLB,,

## 2023-08-08 PROCEDURE — 99999 PR PBB SHADOW E&M-EST. PATIENT-LVL IV: ICD-10-PCS | Mod: PBBFAC,,,

## 2023-08-08 PROCEDURE — 3288F PR FALLS RISK ASSESSMENT DOCUMENTED: ICD-10-PCS | Mod: CPTII,S$GLB,,

## 2023-08-08 PROCEDURE — 3051F PR MOST RECENT HEMOGLOBIN A1C LEVEL 7.0 - < 8.0%: ICD-10-PCS | Mod: CPTII,S$GLB,,

## 2023-08-08 PROCEDURE — 3061F NEG MICROALBUMINURIA REV: CPT | Mod: CPTII,S$GLB,,

## 2023-08-08 PROCEDURE — 3061F PR NEG MICROALBUMINURIA RESULT DOCUMENTED/REVIEW: ICD-10-PCS | Mod: CPTII,S$GLB,,

## 2023-08-08 PROCEDURE — 3051F HG A1C>EQUAL 7.0%<8.0%: CPT | Mod: CPTII,S$GLB,,

## 2023-08-08 PROCEDURE — 3078F PR MOST RECENT DIASTOLIC BLOOD PRESSURE < 80 MM HG: ICD-10-PCS | Mod: CPTII,S$GLB,,

## 2023-08-08 PROCEDURE — 3066F PR DOCUMENTATION OF TREATMENT FOR NEPHROPATHY: ICD-10-PCS | Mod: CPTII,S$GLB,,

## 2023-08-08 PROCEDURE — 1101F PT FALLS ASSESS-DOCD LE1/YR: CPT | Mod: CPTII,S$GLB,,

## 2023-08-08 PROCEDURE — 3075F SYST BP GE 130 - 139MM HG: CPT | Mod: CPTII,S$GLB,,

## 2023-08-08 PROCEDURE — 3008F PR BODY MASS INDEX (BMI) DOCUMENTED: ICD-10-PCS | Mod: CPTII,S$GLB,,

## 2023-08-08 PROCEDURE — 3066F NEPHROPATHY DOC TX: CPT | Mod: CPTII,S$GLB,,

## 2023-08-08 PROCEDURE — 1126F PR PAIN SEVERITY QUANTIFIED, NO PAIN PRESENT: ICD-10-PCS | Mod: CPTII,S$GLB,,

## 2023-08-08 PROCEDURE — 99999 PR PBB SHADOW E&M-EST. PATIENT-LVL IV: CPT | Mod: PBBFAC,,,

## 2023-08-08 PROCEDURE — 3008F BODY MASS INDEX DOCD: CPT | Mod: CPTII,S$GLB,,

## 2023-08-08 PROCEDURE — 1101F PR PT FALLS ASSESS DOC 0-1 FALLS W/OUT INJ PAST YR: ICD-10-PCS | Mod: CPTII,S$GLB,,

## 2023-08-08 PROCEDURE — 3078F DIAST BP <80 MM HG: CPT | Mod: CPTII,S$GLB,,

## 2023-08-08 PROCEDURE — 1159F MED LIST DOCD IN RCRD: CPT | Mod: CPTII,S$GLB,,

## 2023-08-08 PROCEDURE — 99214 OFFICE O/P EST MOD 30 MIN: CPT | Mod: S$GLB,,,

## 2023-08-08 RX ORDER — METOPROLOL SUCCINATE 25 MG/1
25 TABLET, EXTENDED RELEASE ORAL DAILY
COMMUNITY
Start: 2023-08-04

## 2023-08-08 NOTE — TELEPHONE ENCOUNTER
Urology referral sent to Dr Jagdeep Gamboa in [place], phone number [phone], fax number [phone]

## 2023-08-08 NOTE — PROGRESS NOTES
[facility]      HPI  Claire Ang, 73 y.o. female, presents to office for a follow up visit for CKD 3B.  Patient's history significant for diabetes and hypertension.  She has only been diabetic for about 2 years and hypertensive for about 5 years. Creatinine has ranged from 1.2-1.6 since 2019. Hypercalcemia (up to 10.6);  PTH and ionized calcium within normal limits. SIEP completed, negative. Last visit we referred her back to urology for frequent UTIs and persistent hematuria.     She has a history of breast cancer with most recent scans negative for recurrence.  She was followed by oncology Dr. Jasmyne Tsang, however she plans on going back to [facility] for future followups.     She currently is complaining of right flank pain.    Patient denies taking NSAIDs or new antibiotics. Also denies recent episode of dehydration, diarrhea, vomiting, acute illness, hospitalization, recent angiograms or exposure to IV radiocontrast.        Medical Diagnoses:   Past Medical History:   Diagnosis Date    Anxiety     Diabetes mellitus     Fibromyalgia     Gastric ulcer     Hypothyroidism     Nephrolithiasis     Onychomycosis     Osteoporosis     Stage 3b chronic kidney disease      Patient Active Problem List   Diagnosis    Age-related osteoporosis without current pathological fracture    Diabetes mellitus    Hypothyroidism    Anxiety    Depressive disorder    History of breast cancer    History of antineoplastic chemotherapy    Wellness examination    Gastroesophageal reflux disease    Genital herpes simplex    Malignant neoplasm of skin of upper extremity    Osteoporosis    Acute pain of left shoulder    Malignant neoplasm of central portion of right breast in female, estrogen receptor positive    Hyperlipidemia associated with type 2 diabetes mellitus    Hypertension associated with diabetes    Postmenopausal    History of skin cancer    Chronic cystitis    Advanced care planning/counseling  discussion    Stage 3b chronic kidney disease    Gout    Myalgia due to statin    Hypercalcemia    Other emphysema    Primary hypertension       Surgical History:   Past Surgical History:   Procedure Laterality Date    APPENDECTOMY      CHOLECYSTECTOMY      COLONOSCOPY  01/16/2015    Rakesh Henriquez MD    HYSTERECTOMY      MASTECTOMY Right     PHACOEMULSIFICATION, CATARACT, WITH IOL INSERTION Left 2/28/2023    Procedure: PHACOEMULSIFICATION, CATARACT, WITH IOL INSERTION- OS;  Surgeon: Stefanie Zapien MD;  Location: [facility];  Service: Ophthalmology;  Laterality: Left;    rt kidney stent      UPPER GASTROINTESTINAL ENDOSCOPY  07/12/2022       Family History:   History reviewed. No pertinent family history.    Social History:   Social History     Tobacco Use    Smoking status: Never     Passive exposure: Never    Smokeless tobacco: Never   Substance Use Topics    Alcohol use: Not Currently       Allergies:  Review of patient's allergies indicates:   Allergen Reactions    Hydrocodone bitartrate      Other reaction(s): GI Intolerance  Other reaction(s): GI Intolerance  Acetaminophen hydrocodone bitartrate  Acetaminophen hydrocodone bitartrate      Sulfamethoxazole-trimethoprim      Other reaction(s): stomach pain  Other reaction(s): stomach pain      Hydrocodone-acetaminophen Nausea And Vomiting     Other reaction(s): Visual hallucinations  Other reaction(s): Visual hallucinations      Meperidine Nausea And Vomiting     Other reaction(s): GI Intolerance, Visual hallucinations  Other reaction(s): GI Intolerance, Visual hallucinations    Other reaction(s): Vomitting    Morphine Palpitations     Other reaction(s): chest tightness  Other reaction(s): chest tightness         Medications:    Current Outpatient Medications:     cetirizine (ZYRTEC) 5 MG tablet, Take 5 mg by mouth daily as needed., Disp: , Rfl:     cyanocobalamin, vitamin B-12, 1,000 mcg Subl, Take by mouth once daily., Disp: , Rfl:     ezetimibe (ZETIA) 10  "mg tablet, Take 1 tablet (10 mg total) by mouth every evening., Disp: 90 tablet, Rfl: 3    FREESTYLE PAULO 14 DAY SENSOR Kit, USE TO MONITOR BLOOD SUGAR (DX E11.9), Disp: 2 kit, Rfl: 11    metoprolol succinate (TOPROL-XL) 25 MG 24 hr tablet, Take 25 mg by mouth Daily., Disp: , Rfl:     pantoprazole (PROTONIX) 40 MG tablet, Take 40 mg by mouth every morning., Disp: , Rfl:     semaglutide (OZEMPIC) 0.25 mg or 0.5 mg (2 mg/3 mL) pen injector, Inject 0.5 mg into the skin every 7 days., Disp: 3 mL, Rfl: 3    SYNTHROID 75 mcg tablet, Take 1 tablet (75 mcg total) by mouth once daily., Disp: 90 tablet, Rfl: 2    tretinoin, emollient, (RENOVA) 0.02 % Crea, Apply 1 application topically once daily., Disp: 40 g, Rfl: 0    valACYclovir (VALTREX) 1000 MG tablet, Take by mouth as needed., Disp: , Rfl:     WELLBUTRIN  mg TBSR 12 hr tablet, Take 100 mg by mouth 2 (two) times daily., Disp: , Rfl:        Review of Systems:    Constitutional: Denies fever, fatigue, generalized weakness  Skin: Denies wounds, no rashes, no itching, no new skin lesions  Respiratory:  Denies cough, shortness of breath, or wheezing  Cardiovascular: Denies chest pain, palpitations, or swelling  Gastrointestional: Denies abdominal pain, nausea, vomiting, diarrhea, or constipation  Genitourinary: Denies dysuria, hematuria, foamy urine, or incontinence; reports able to empty bladder  Musculoskeletal: + right flank pain  Neurological: Denies headaches, dizziness, paresthesias, tremors or focal weakness      Vital Signs:  /60 (BP Location: Left arm, Patient Position: Sitting)   Pulse 76   Temp 97.5 °F (36.4 °C) (Temporal)   Resp 20   Ht 5' 4" (1.626 m)   Wt 71.6 kg (157 lb 13.6 oz)   SpO2 96%   BMI 27.09 kg/m²   Body mass index is 27.09 kg/m².      Physical Exam:    General: no acute distress, awake, alert  Eyes: conjunctiva clear, eyelids without swelling  HENT: atraumatic, oropharynx and nasal mucosa patent  Neck: supple, trache midline, no " JVD  Respiratory: equal, unlabored, clear to auscultation A/P  Cardiovascular: RRR without murmur or rub  Edema: none  Gastrointestinal: soft, non-tender, non-distended; positive bowel sounds; no masses to palpation; no ascites  Genitourinary: + right CVA tenderness  Musculoskeletal: ROM without new limitation or discomfort  Integumentary: warm, dry; no rashes, wounds, or skin lesions  Neurological: oriented x4, appropriate, no acute deficits; no asterixis      Labs:        Component Value Date/Time     07/25/2023 1347     05/18/2023 1210    K 4.5 07/25/2023 1347    K 3.8 05/18/2023 1210    CO2 25 07/25/2023 1347    CO2 27 05/18/2023 1210    BUN 21.0 (H) 07/25/2023 1347    BUN 19.3 05/18/2023 1210    CREATININE 1.40 (H) 07/25/2023 1347    CREATININE 1.23 (H) 05/18/2023 1210    CREATININE 1.16 (H) 03/29/2023 1601    CREATININE 1.44 (H) 01/03/2023 1435    CALCIUM 10.0 07/25/2023 1347    CALCIUM 10.0 05/18/2023 1210    PHOS 3.1 07/25/2023 1347    PHOS 3.1 05/18/2023 1210    PTH 65.3 07/25/2023 1347    PTH 56.9 03/29/2023 1601           Component Value Date/Time    WBC 9.21 07/25/2023 1347    WBC 8.70 05/18/2023 1210    HGB 13.0 07/25/2023 1347    HGB 11.7 (L) 05/18/2023 1210    HCT 39.7 07/25/2023 1347    HCT 34.9 (L) 05/18/2023 1210    HCT 37.0 (L) 06/22/2021 0921     07/25/2023 1347     05/18/2023 1210       Urine Creatinine   Date Value Ref Range Status   07/25/2023 166.3 (H) 45.0 - 106.0 mg/dL Final   05/18/2023 140.3 (H) 47.0 - 110.0 mg/dL Final       Urine Protein Level   Date Value Ref Range Status   07/25/2023 12.9 mg/dL Final   05/18/2023 8.4 mg/dL Final           Imaging:  Retroperitoneal US:  IMPRESSION: Calcifications in the right kidney which might indicate  nephrolithiasis.     Slight contour irregularity of the left kidney might be related to  scar.     No other abnormalities      Date/Time Signed: 12/04/2020 09:25    Impression:    1. Stage 3b chronic kidney disease        2.  Hypercalcemia        3. Type 2 diabetes mellitus with stage 3b chronic kidney disease, without long-term current use of insulin        4. Primary hypertension        5. UTI (urinary tract infection), uncomplicated            CKD3b related to nephrosclerosis.  Patient has also had systemic chemotherapy and use of NSAIDs in the past. Renal function within her baseline over the last 2-4 years, however creatinine is on higher side of her baseline compared to the last 6 months.  Likely due to continued hypercalcemia    Hypercalcemia: corrected calcium for albumin 10.7-11.  Her cardiologist did discontinue her Diovan about 4 days ago and started her on metoprolol.  Agree with this as I planned on discontinuing HCTZ at this visit. SIEP negative for monoclonal gammopathy.    Blood pressure has been well controlled on metoprolol    PCP recently increased her Ozempic due to A1c of 7.3    We called antibiotics for UTI couple of weeks ago, patient states she is feeling much better    She reports her Urology appointment is not until November so she has not seen a urologist yet about persistent hematuria and frequent UTIs        Plan:    Pt living in [place] and would like a urology referral there Dr. Jagdeep Gamboa [phone].    Recommend if patient's right flank pain worsens or she gets a fever or any change in urinary symptoms to go to the emergency room to rule out an acute kidney stone    Follow up in 4 months with labs within the week before.    Patient to call our office with any concerns prior to next appointment.    Avoid NSAIDs (Aleve, Mobic, Celebrex, Ibuprofen, Advil, Toradol and Diclofenac).  Only take tylenol occasionally if needed for aches/pains.    Recommend low sodium diet:  Avoid high salt foods (olives, pickles, smoked meats, deli meats, salted potato chips, etc.).   Do not add salt to your food at the table.   Use only small amounts of salt when cooking.      MARC Galvez    This note was  created with the assistance of MModal voice recognition software or phone dictation. There may be transcription errors as a result of using this technology however minimal. Effort has been made to assure accuracy of transcription but any obvious errors or omissions should be clarified with the author of the document

## 2023-08-09 ENCOUNTER — OFFICE VISIT (OUTPATIENT)
Dept: URGENT CARE | Facility: CLINIC | Age: 73
End: 2023-08-09
Payer: MEDICARE

## 2023-08-09 VITALS
HEART RATE: 83 BPM | SYSTOLIC BLOOD PRESSURE: 122 MMHG | WEIGHT: 157 LBS | DIASTOLIC BLOOD PRESSURE: 61 MMHG | HEIGHT: 64 IN | OXYGEN SATURATION: 98 % | TEMPERATURE: 98 F | BODY MASS INDEX: 26.8 KG/M2 | RESPIRATION RATE: 18 BRPM

## 2023-08-09 DIAGNOSIS — M10.372 ACUTE GOUT DUE TO RENAL IMPAIRMENT INVOLVING TOE OF LEFT FOOT: Primary | ICD-10-CM

## 2023-08-09 PROCEDURE — 99213 OFFICE O/P EST LOW 20 MIN: CPT | Mod: ,,, | Performed by: FAMILY MEDICINE

## 2023-08-09 PROCEDURE — 99213 PR OFFICE/OUTPT VISIT, EST, LEVL III, 20-29 MIN: ICD-10-PCS | Mod: ,,, | Performed by: FAMILY MEDICINE

## 2023-08-09 RX ORDER — PREDNISONE 10 MG/1
TABLET ORAL
Qty: 10 TABLET | Refills: 0 | Status: SHIPPED | OUTPATIENT
Start: 2023-08-09 | End: 2023-08-21

## 2023-08-09 RX ORDER — COLCHICINE 0.6 MG/1
TABLET ORAL
Qty: 3 TABLET | Refills: 0 | Status: SHIPPED | OUTPATIENT
Start: 2023-08-09 | End: 2023-11-03 | Stop reason: ALTCHOICE

## 2023-08-09 NOTE — PATIENT INSTRUCTIONS
Please take prednisone taper as directed over the next 4 days    Please monitor your sugars multiple times a day over the next 4 days.  If her sugars remain above 300 for longer than 2 hours, please report to the emergency room for blood sugar management.  Your primary care doctor may also have injectable insulin that they can provide to you at their office, but you should call and find out.    If symptoms change or worsen, please follow-up with medical care here here or in the emergency room.

## 2023-08-09 NOTE — PROGRESS NOTES
Patient ID: [id number]     Chief Complaint:  Toe pain    History of Present Illness:     Claire Ang is a 73 y.o. female  who presents today for symptoms of Pain ( Patient is a 73 y.o. female who presents to urgent care with complaints of pain in left big toe x2 days. Patient has a hx of gout in that toe and usually takes Colchicine 0.6 mg which typically helps, but she currently cannot take this medication because her nephrologist told her to stop taking it because she kidneys are at 42%. )    73-year-old with a history of gout, CKD 3B, type 2 diabetes, presents today with a 2 day history left big toe pain which feels like her similar bouts of gout.  Her nephrologist office told her that she cannot use colchicine anymore because her GFR is at about 40, but they told her that she can use allopurinol.    Past Medical History:     ----------------------------  Anxiety  Diabetes mellitus  Fibromyalgia  Gastric ulcer  Hypothyroidism  Nephrolithiasis  Onychomycosis  Osteoporosis  Stage 3b chronic kidney disease     Past Surgical History:   Procedure Laterality Date    APPENDECTOMY      CHOLECYSTECTOMY      COLONOSCOPY  01/16/2015    Rakesh Henriquez MD    HYSTERECTOMY      MASTECTOMY Right     PHACOEMULSIFICATION, CATARACT, WITH IOL INSERTION Left 2/28/2023    Procedure: PHACOEMULSIFICATION, CATARACT, WITH IOL INSERTION- OS;  Surgeon: Stefanie Zapien MD;  Location: [facility];  Service: Ophthalmology;  Laterality: Left;    rt kidney stent      UPPER GASTROINTESTINAL ENDOSCOPY  07/12/2022       Review of patient's allergies indicates:   Allergen Reactions    Hydrocodone bitartrate      Other reaction(s): GI Intolerance  Other reaction(s): GI Intolerance  Acetaminophen hydrocodone bitartrate  Acetaminophen hydrocodone bitartrate      Sulfamethoxazole-trimethoprim      Other reaction(s): stomach pain  Other reaction(s): stomach pain      Hydrocodone-acetaminophen Nausea And Vomiting     Other reaction(s):  Visual hallucinations  Other reaction(s): Visual hallucinations      Meperidine Nausea And Vomiting     Other reaction(s): GI Intolerance, Visual hallucinations  Other reaction(s): GI Intolerance, Visual hallucinations    Other reaction(s): Vomitting    Morphine Palpitations     Other reaction(s): chest tightness  Other reaction(s): chest tightness         Outpatient Medications Marked as Taking for the 8/9/23 encounter (Office Visit) with Jagdeep Alex MD   Medication Sig Dispense Refill    cetirizine (ZYRTEC) 5 MG tablet Take 5 mg by mouth daily as needed.      cyanocobalamin, vitamin B-12, 1,000 mcg Subl Take by mouth once daily.      ezetimibe (ZETIA) 10 mg tablet Take 1 tablet (10 mg total) by mouth every evening. 90 tablet 3    FREESTYLE PAULO 14 DAY SENSOR Kit USE TO MONITOR BLOOD SUGAR (DX E11.9) 2 kit 11    metoprolol succinate (TOPROL-XL) 25 MG 24 hr tablet Take 25 mg by mouth Daily.      semaglutide (OZEMPIC) 0.25 mg or 0.5 mg (2 mg/3 mL) pen injector Inject 0.5 mg into the skin every 7 days. 3 mL 3    SYNTHROID 75 mcg tablet Take 1 tablet (75 mcg total) by mouth once daily. 90 tablet 2    tretinoin, emollient, (RENOVA) 0.02 % Crea Apply 1 application topically once daily. 40 g 0    WELLBUTRIN  mg TBSR 12 hr tablet Take 100 mg by mouth 2 (two) times daily.         Social History     Socioeconomic History    Marital status:    Tobacco Use    Smoking status: Never     Passive exposure: Never    Smokeless tobacco: Never   Substance and Sexual Activity    Alcohol use: Not Currently    Drug use: Never   Social History Narrative    ** Merged History Encounter **             Family History   Problem Relation Age of Onset    Cancer Mother     Cancer Brother     Lung disease Brother         Subjective:     Review of Systems   Constitutional:  Negative for chills, fever and malaise/fatigue.   Musculoskeletal:  Positive for joint pain. Negative for back pain and falls.   Neurological:  Negative for  "dizziness, tingling, sensory change, speech change, focal weakness, seizures and loss of consciousness.       Objective:     /61   Pulse 83   Temp 97.7 °F (36.5 °C)   Resp 18   Ht 5' 4" (1.626 m)   Wt 71.2 kg (157 lb)   SpO2 98%   BMI 26.95 kg/m²     Physical Exam  Constitutional:       General: She is not in acute distress.     Appearance: Normal appearance. She is not ill-appearing or toxic-appearing.   HENT:      Head: Normocephalic and atraumatic.   Cardiovascular:      Rate and Rhythm: Normal rate and regular rhythm.   Pulmonary:      Effort: Pulmonary effort is normal.      Breath sounds: Normal breath sounds.   Musculoskeletal:         General: Tenderness present. No swelling, deformity or signs of injury.      Cervical back: No rigidity or tenderness.      Right lower leg: No edema.      Left lower leg: No edema.   Lymphadenopathy:      Cervical: No cervical adenopathy.   Skin:     Findings: No erythema.      Comments: Left big toe:  No obvious deformity  No skin changes, no erythema no swelling  Very mild warmth  Exquisite tenderness all about the MTP, and ITP joints, as well as the proximal and distal phalanges.  Active and passive range of motion is normal, though limited by pain.  Capillary refill is normal   Neurological:      General: No focal deficit present.      Mental Status: She is alert and oriented to person, place, and time.      Cranial Nerves: No cranial nerve deficit.      Sensory: No sensory deficit.      Motor: No weakness.      Coordination: Coordination normal.      Gait: Gait normal.      Deep Tendon Reflexes: Reflexes normal.   Psychiatric:         Mood and Affect: Mood normal.         Behavior: Behavior normal.         Thought Content: Thought content normal.         Assessment & Plan:       ICD-10-CM ICD-9-CM   1. Acute gout due to renal impairment involving toe of left foot  M10.372 274.01     588.89        1. Acute gout due to renal impairment involving toe of left " foot  -     predniSONE (DELTASONE) 10 MG tablet; Take 4 tablets (40 mg total) by mouth once daily for 1 day, THEN 3 tablets (30 mg total) once daily for 1 day, THEN 2 tablets (20 mg total) once daily for 1 day, THEN 1 tablet (10 mg total) once daily for 1 day.  Dispense: 10 tablet; Refill: 0  -     colchicine (COLCRYS) 0.6 mg tablet; Take 2 tablets (1.2 mg) once today, and one tablet (0.6 mg) tomorrow  Dispense: 3 tablet; Refill: 0         Discussed with her that her nephrologist recommendation of allopurinol is unfortunately not a treatment for an acute gout flare, and moreover starting treatment with allopurinol can paradoxically cause a gout flare in and of itself.  Given this, I declined to prescribe allopurinol for her today.    The other remaining options or steroids or NSAIDs.  She has both CKD and type 2 diabetes.  Taking colchicine or another NSAID will cause her kidneys to take it, and taking any sort of leuko corticoid will cause her blood sugars arise.  After discussing both of these non optimal choices, she chose to try pregnant for a brief taper.  She will monitor her sugars multiple times a day and will report to the emergency room if her sugars stay higher than 300 for over 2 hours.  She does not have any glucose lowering medications at her house, as she only takes was epic.    Because this is a very short taper necessitated by her unknown glucose response, she may initially have relief but rebound may occur.  If this happens, but her blood sugars were manageable on the prednisone, I will send in another taper that is more top heavy with a few more days at the 30 mg and 40 mg    Addendum:  After the visit patient called her nephrologist to advised her that she can have colchicine for 2 days, so I will put that in as well.

## 2023-08-14 PROBLEM — Z92.21 HISTORY OF ANTINEOPLASTIC CHEMOTHERAPY: Status: RESOLVED | Noted: 2022-05-26 | Resolved: 2023-08-14

## 2023-08-14 NOTE — PROGRESS NOTES
Subjective:       Patient ID: Claire Ang is a 73 y.o. female.    Surgeon: Dr. Reji Navas  [facility] medical oncologist: Dr. Claire Delaney  Primary care: Dr. Jany Magana    1. Right Breast Cancer stage IB ([id number])--ER+/SD+Her-2neg diagnosed 10/2015  Biopsy/pathology: Excisional biopsy right breast mass UOQ done 8/12/2015--invasive poorly differentiated ductal carcinoma, Irish grade 3, multifocal 18mm and 5mm in greatest dimension respectively, high grade DCIS associated, invasive carcinoma present at margin, ER 97%, SD 64%, Her2 2+ by IHC and non-amplified by FISH, Ki67 68%  Surgery/pathology: Right breast mastectomy with SLN biopsy 9/9/2015--biopsy site changes with no residual in-situ or invasive carcinoma, 1/6 lymph nodes positive for metastatic carcinoma measures only 0.5mm with no extranodal extension, Oncotype DX score 24 (16% chance of distant recurrence with Tamoxifen alone).    DEXA:  5/30/17--AP spine -0.6, Left femoral neck -1.4, right -2.9, left total hip -0.8, -1.7 c/w osteoporosis.  7/23/19--AP spine 0.9, Left femoral neck -1.4, right -2.5, Left total hip -0.9, right -2.1--improved spine, right femur, worse in right and left total hips.  8/19/21--AP spine 0.4, Left femoral neck -1.6, right -2.7, Left total hip -0.9, right -2.0(mixed response).    Genetic testing BRCA1/2 negative 3/2016.    2. RUE DVT(post-surgical) 9/23/2015--treated with Xarelto (stopped due to hematuria).    Treatment history:  1. Weekly Taxol X 12 and Adriamycin/Cytoxan X 4 cycles completed 4/26/2016.  2. Boniva 11/2016--stopped 2/2017 (unable to tolerate).  3. Femara started 5/17/2016 changed to Arimidex 8/2016 (arthralgias), changed to Aromasin 11/2016 (patient never started).  4. Aromasin 3/26/2018--stopped 5/2018 due to joint problems.  5. Tamoxifen 8/26/18--stopped in 2020 for side effects, patient stopped on her own (unknown dates).  6. Prolia 2/28/17--stopped 8/2022 (due to patient no longer being on  AI).    Current treatment: Observation. Stopped Tamoxifen. Refusing to restart or try AI.     Chief Complaint: Other Misc (Pt reports a sinus infection and is on ABX.)    HPI   Patient presents for telemedicine follow-up for breast cancer. She is doing well. Mentions she was referred to a Nephrologist for the abnormal renal function which is thought to be from recurrent UTIs so she is now seeing a Urologist. She was seen last week and had another urin culture done (results still pending). She also has an upper respiratory infection now and is taking Augmentin. States she was referred to a pulmonologist as well and is scheduled for upcoming CT of chest. She is still not interested in starting back on Tamoxifen or AI's at this time. Scheduled for MMG and DEXA later on today. No other problems    Past Medical History:   Diagnosis Date    Anxiety     Diabetes mellitus     Fibromyalgia     Gastric ulcer     Hypothyroidism     Nephrolithiasis     Onychomycosis     Osteoporosis     Stage 3b chronic kidney disease       Review of patient's allergies indicates:   Allergen Reactions    Hydrocodone bitartrate      Other reaction(s): GI Intolerance  Other reaction(s): GI Intolerance  Acetaminophen hydrocodone bitartrate  Acetaminophen hydrocodone bitartrate      Sulfamethoxazole-trimethoprim      Other reaction(s): stomach pain  Other reaction(s): stomach pain      Hydrocodone-acetaminophen Nausea And Vomiting     Other reaction(s): Visual hallucinations  Other reaction(s): Visual hallucinations      Meperidine Nausea And Vomiting     Other reaction(s): GI Intolerance, Visual hallucinations  Other reaction(s): GI Intolerance, Visual hallucinations    Other reaction(s): Vomitting    Morphine Palpitations     Other reaction(s): chest tightness  Other reaction(s): chest tightness        Current Outpatient Medications on File Prior to Visit   Medication Sig Dispense Refill    amoxicillin-clavulanate 875-125mg (AUGMENTIN) 875-125 mg  per tablet Take 1 tablet by mouth 2 (two) times daily.      cetirizine (ZYRTEC) 5 MG tablet Take 5 mg by mouth daily as needed.      colchicine (COLCRYS) 0.6 mg tablet Take 2 tablets (1.2 mg) once today, and one tablet (0.6 mg) tomorrow 3 tablet 0    cyanocobalamin, vitamin B-12, 1,000 mcg Subl Take by mouth once daily.      ezetimibe (ZETIA) 10 mg tablet Take 1 tablet (10 mg total) by mouth every evening. 90 tablet 3    FREESTYLE PAULO 14 DAY SENSOR Kit USE TO MONITOR BLOOD SUGAR (DX E11.9) 2 kit 11    metoprolol succinate (TOPROL-XL) 25 MG 24 hr tablet Take 25 mg by mouth Daily.      mupirocin (BACTROBAN) 2 % ointment Apply topically 3 (three) times daily. 22 g 5    pantoprazole (PROTONIX) 40 MG tablet Take 40 mg by mouth every morning.      semaglutide (OZEMPIC) 0.25 mg or 0.5 mg (2 mg/3 mL) pen injector Inject 0.5 mg into the skin every 7 days. 3 mL 3    SYNTHROID 75 mcg tablet Take 1 tablet (75 mcg total) by mouth once daily. 90 tablet 3    tretinoin, emollient, (RENOVA) 0.02 % Crea Apply 1 application topically once daily. 40 g 0    valACYclovir (VALTREX) 1000 MG tablet Take by mouth as needed.      WELLBUTRIN  mg TBSR 12 hr tablet Take 100 mg by mouth 2 (two) times daily.       No current facility-administered medications on file prior to visit.      Review of Systems   Constitutional:  Negative for appetite change, fatigue, fever and unexpected weight change.   HENT:  Positive for postnasal drip and sinus pressure/congestion. Negative for mouth sores.    Eyes: Negative.    Respiratory:  Negative for cough and shortness of breath.    Cardiovascular:  Negative for chest pain and leg swelling.   Gastrointestinal:  Negative for abdominal distention, abdominal pain, constipation, diarrhea, nausea, vomiting and reflux.   Genitourinary:  Negative for difficulty urinating, dysuria and hematuria.        Frequent UTIs   Musculoskeletal:  Negative for arthralgias and back pain.   Integumentary:  Negative for  rash.   Neurological:  Negative for weakness and headaches.   Hematological:  Negative for adenopathy.   Psychiatric/Behavioral:  Negative for sleep disturbance. The patient is not nervous/anxious.             Physical Exam  Vitals reviewed.   Constitutional:       Appearance: Normal appearance.   HENT:      Head: Normocephalic.   Eyes:      Extraocular Movements: Extraocular movements intact.   Pulmonary:      Effort: Pulmonary effort is normal.   Musculoskeletal:      Cervical back: Neck supple.   Neurological:      Mental Status: She is alert and oriented to person, place, and time.   Psychiatric:         Mood and Affect: Mood normal.         Thought Content: Thought content normal.         Judgment: Judgment normal.       Lab Visit on 08/18/2023   Component Date Value    Sodium Level 08/18/2023 141     Potassium Level 08/18/2023 4.1     Chloride 08/18/2023 103     Carbon Dioxide 08/18/2023 30     Glucose Level 08/18/2023 137 (H)     Blood Urea Nitrogen 08/18/2023 14.6     Creatinine 08/18/2023 1.18 (H)     Calcium Level Total 08/18/2023 9.1     Protein Total 08/18/2023 6.2     Albumin Level 08/18/2023 3.4     Globulin 08/18/2023 2.8     Albumin/Globulin Ratio 08/18/2023 1.2     Bilirubin Total 08/18/2023 0.7     Alkaline Phosphatase 08/18/2023 78     Alanine Aminotransferase 08/18/2023 12     Aspartate Aminotransfera* 08/18/2023 14     eGFR 08/18/2023 49     WBC 08/18/2023 8.56     RBC 08/18/2023 3.68 (L)     Hgb 08/18/2023 11.5 (L)     Hct 08/18/2023 35.5 (L)     MCV 08/18/2023 96.5 (H)     MCH 08/18/2023 31.3 (H)     MCHC 08/18/2023 32.4 (L)     RDW 08/18/2023 13.0     Platelet 08/18/2023 205     MPV 08/18/2023 8.4     Neut % 08/18/2023 57.2     Lymph % 08/18/2023 33.2     Mono % 08/18/2023 6.2     Eos % 08/18/2023 2.8     Basophil % 08/18/2023 0.4     Lymph # 08/18/2023 2.84     Neut # 08/18/2023 4.90     Mono # 08/18/2023 0.53     Eos # 08/18/2023 0.24     Baso # 08/18/2023 0.03     IG# 08/18/2023 0.02      IG% 08/18/2023 0.2    Clinical Support on 08/15/2023   Component Date Value    Interpretation 08/16/2023                      Value:Spirometry is normal. Spirometry remains unimproved following bronchodilator. Lung volume determination is normal. Airway mechanics show airway resistance is increased. Specific conductance remains normal. DLCO is mildly decreased. MVV is normal.   Â   Notes: The failure to demonstrate improvement in spirometry does not preclude a clinical response to a trial of bronchodilators.       Post FVC 08/16/2023 2.77     Post FEV1 08/16/2023 2.20     Post FEV1 FVC 08/16/2023 79.62     Post FEF 25 75 08/16/2023 2.22     Post PEF 08/16/2023 5.52     Post  08/16/2023 7.25     Pre DLCO 08/16/2023 13.07 (L)     DLCO ADJ PRE 08/16/2023 13.07 (L)     DLCOVA PRE 08/16/2023 3.46     DLVAAdj PRE 08/16/2023 3.46     VA PRE 08/16/2023 3.77 (L)     IVC PRE 08/16/2023 2.32     Pre TLC 08/16/2023 4.91     VC PRE 08/16/2023 2.71     Pre FRC PL 08/16/2023 2.68     Pre ERV 08/16/2023 0.47     Pre RV 08/16/2023 2.20     RVTLC PRE 08/16/2023 44.76     Raw PRE 08/16/2023 3.90 (H)     VTGRAWPRE 08/16/2023 3.05     Pre FVC 08/16/2023 2.63     Pre FEV1 08/16/2023 2.02     Pre FEV1 FVC 08/16/2023 76.53     Pre FEF 25 75 08/16/2023 1.76     Pre PEF 08/16/2023 4.00     Pre  08/16/2023 8.36     Pre MVV 08/16/2023 84.00     FVC Ref 08/16/2023 2.82     FVC LLN 08/16/2023 2.04     FVC Pre Ref 08/16/2023 93.5     FVC Post Ref 08/16/2023 98.3     FVC Chg 08/16/2023 5.1     FEV1 Ref 08/16/2023 2.18     FEV1 LLN 08/16/2023 1.58     FEV1 Pre Ref 08/16/2023 92.6     FEV1 Post Ref 08/16/2023 101.3     FEV1 Chg 08/16/2023 9.4     FEV1 FVC Ref 08/16/2023 78     FEV1 FVC LLN 08/16/2023 64     FEV1 FVC Pre Ref 08/16/2023 98.2     FEV1 FVC Post Ref 08/16/2023 102.2     FEV1 FVC Chg 08/16/2023 4.0     FEF 25 75 Ref 08/16/2023 1.82     FEF 25 75 LLN 08/16/2023 0.83     FEF 25 75 Pre Ref 08/16/2023 96.7     FEF 25 75 Post Ref  08/16/2023 121.8     FEF 25 75 Chg 08/16/2023 25.9     PEF Ref 08/16/2023 5.60     PEF LLN 08/16/2023 3.88     PEF Pre Ref 08/16/2023 71.5     PEF Post Ref 08/16/2023 98.6     PEF Chg 08/16/2023 37.9     FFL364 Chg 08/16/2023 -13.3     MVV Ref 08/16/2023 83     MVV LLN 08/16/2023 70     MVV Pre Ref 08/16/2023 101.8     TLC Ref 08/16/2023 4.97     TLC LLN 08/16/2023 3.98     TLC Pre Ref 08/16/2023 98.8     VC Ref 08/16/2023 2.82     VC LLN 08/16/2023 2.04     VC Pre Ref 08/16/2023 96.2     FRCpleth Ref 08/16/2023 2.72     FRCpleth LLN 08/16/2023 1.90     FRCpleth PreRef 08/16/2023 98.3     ERV Ref 08/16/2023 0.64     ERV LLN 08/16/2023 -86211.36     ERV Pre Ref 08/16/2023 74.4     RV Ref 08/16/2023 2.09     RV LLN 08/16/2023 1.51     RV Pre Ref 08/16/2023 105.3     RVTLC Ref 08/16/2023 43     RVTLC LLN 08/16/2023 34     RVTLC Pre Ref 08/16/2023 103.9     Raw Ref 08/16/2023 3.06     Raw LLN 08/16/2023 3.06     Raw Pre Ref 08/16/2023 127.5     DLCO Single Breath Ref 08/16/2023 21.24     DLCO Single Breath LLN 08/16/2023 15.51     DLCO Single Breath Pre R* 08/16/2023 61.5     DLCOc Single Breath Ref 08/16/2023 21.24     DLCOc Single Breath LLN 08/16/2023 15.51     DLCOc Single Breath Pre * 08/16/2023 61.5     DLCOVA Ref 08/16/2023 4.28     DLCOVA LLN 08/16/2023 2.84     DLCOVA Pre Ref 08/16/2023 80.9     DLCOc SBVA Ref 08/16/2023 4.28     DLCOc SBVA LLN 08/16/2023 2.84     DLCOc SBVA Pre Ref 08/16/2023 80.9     VA Single Breath Ref 08/16/2023 4.82     VA Single Breath LLN 08/16/2023 4.82     VA Single Breath Pre Ref 08/16/2023 78.3     IVC Single Breath Ref 08/16/2023 2.82     IVC Single Breath LLN 08/16/2023 2.04     IVC Single Breath Pre Ref 08/16/2023 82.3             Assessment:       1. Malignant neoplasm of central portion of right breast in female, estrogen receptor positive    2. Stage 3b chronic kidney disease         Plan:       Patient with stage IB ([id number]) right breast cancer s/p right mastectomy in  "10/2015, intermediate risk oncotype, completed adjuvant chemotherapy with Taxol weekly X 12 and AC every 3 weeks X 4 cycles on 4/26/2016.   Patient was started in Femara 5/2016, changed to Arimidex 8/2016 but patient stopped all endocrine therapy in 11/2016.   Aromasin prescribed in 3/2018 and she took only for a few months then stopped due to "crippling" arthritis.  Discussed Tamoxifen and she started it in 9/2018 but stopped for a little while. She restarted in but stopped in 2020 due to ongoing arthritis and side effects.  She does not wish to restart any other treatment at this time.    Currently patient has not signs or symptoms to suggest recurrence of disease.  Recent labs show mild anemia with Hgb of 11.5 g/dL. This has been on/off. She also has mild renal insufficiency. She does have PUD that may contribute to her anemia.  Continue MVI with iron daily.   Will add additional workup to next appointment.   Still refusing to take Tamoxifen or AI's.   Left screening MMG and DEXA both scheduled for later on today.   MRI of breasts on 5/3/23 benign with recommendations to alternate every 6 months between MMG.   Will continue routine surveillance visits.  RTC 1 year for follow-up with labs.  Patient has been on and off Prolia. Most recent DEXA from 8/2021 showed mixed response. She does have some osteoporosis right hip. Instructed her to have her PCP manage her bone health from now on since no longer have her on any bone altering medications.   All questions answered at this time.        This is a telemedicine note. Patient was treated using telemedicine, realtime audio and video, according to [facility] protocol. I, distant provider, conducted the visit from [facility]. The patient participated in the visit at a non-[facility] location selected by the patient, identified at her home. I am licensed in the state where the patient stated she was located. The patient stated that she understood and " accepted the privacy and security risks to their information at their location.        OSWALDO Adame

## 2023-08-15 ENCOUNTER — CLINICAL SUPPORT (OUTPATIENT)
Dept: PULMONOLOGY | Facility: CLINIC | Age: 73
End: 2023-08-15
Payer: MEDICARE

## 2023-08-15 ENCOUNTER — OFFICE VISIT (OUTPATIENT)
Dept: FAMILY MEDICINE | Facility: CLINIC | Age: 73
End: 2023-08-15
Payer: MEDICARE

## 2023-08-15 ENCOUNTER — OFFICE VISIT (OUTPATIENT)
Dept: PULMONOLOGY | Facility: CLINIC | Age: 73
End: 2023-08-15
Payer: MEDICARE

## 2023-08-15 VITALS
RESPIRATION RATE: 16 BRPM | HEIGHT: 64 IN | WEIGHT: 159.31 LBS | OXYGEN SATURATION: 98 % | HEART RATE: 70 BPM | BODY MASS INDEX: 27.2 KG/M2 | DIASTOLIC BLOOD PRESSURE: 82 MMHG | TEMPERATURE: 98 F | SYSTOLIC BLOOD PRESSURE: 134 MMHG

## 2023-08-15 VITALS
WEIGHT: 160.69 LBS | SYSTOLIC BLOOD PRESSURE: 106 MMHG | RESPIRATION RATE: 20 BRPM | DIASTOLIC BLOOD PRESSURE: 64 MMHG | HEART RATE: 82 BPM | BODY MASS INDEX: 27.43 KG/M2 | OXYGEN SATURATION: 95 % | HEIGHT: 64 IN

## 2023-08-15 DIAGNOSIS — M81.0 OSTEOPOROSIS, UNSPECIFIED OSTEOPOROSIS TYPE, UNSPECIFIED PATHOLOGICAL FRACTURE PRESENCE: ICD-10-CM

## 2023-08-15 DIAGNOSIS — Z12.31 BREAST CANCER SCREENING BY MAMMOGRAM: ICD-10-CM

## 2023-08-15 DIAGNOSIS — E11.22 TYPE 2 DIABETES MELLITUS WITH STAGE 3B CHRONIC KIDNEY DISEASE, WITHOUT LONG-TERM CURRENT USE OF INSULIN: Primary | ICD-10-CM

## 2023-08-15 DIAGNOSIS — N18.32 STAGE 3B CHRONIC KIDNEY DISEASE: ICD-10-CM

## 2023-08-15 DIAGNOSIS — N18.32 TYPE 2 DIABETES MELLITUS WITH STAGE 3B CHRONIC KIDNEY DISEASE, WITHOUT LONG-TERM CURRENT USE OF INSULIN: Primary | ICD-10-CM

## 2023-08-15 DIAGNOSIS — J43.8 OTHER EMPHYSEMA: ICD-10-CM

## 2023-08-15 DIAGNOSIS — E11.59 HYPERTENSION ASSOCIATED WITH DIABETES: ICD-10-CM

## 2023-08-15 DIAGNOSIS — I15.2 HYPERTENSION ASSOCIATED WITH DIABETES: ICD-10-CM

## 2023-08-15 DIAGNOSIS — E11.69 HYPERLIPIDEMIA ASSOCIATED WITH TYPE 2 DIABETES MELLITUS: ICD-10-CM

## 2023-08-15 DIAGNOSIS — E03.9 HYPOTHYROIDISM, UNSPECIFIED TYPE: ICD-10-CM

## 2023-08-15 DIAGNOSIS — J84.9 INTERSTITIAL PULMONARY DISEASE, UNSPECIFIED: Primary | ICD-10-CM

## 2023-08-15 DIAGNOSIS — Z78.0 POSTMENOPAUSAL: ICD-10-CM

## 2023-08-15 DIAGNOSIS — N30.20 CHRONIC CYSTITIS: ICD-10-CM

## 2023-08-15 DIAGNOSIS — E78.5 HYPERLIPIDEMIA ASSOCIATED WITH TYPE 2 DIABETES MELLITUS: ICD-10-CM

## 2023-08-15 PROCEDURE — 3288F FALL RISK ASSESSMENT DOCD: CPT | Mod: CPTII,S$GLB,, | Performed by: INTERNAL MEDICINE

## 2023-08-15 PROCEDURE — 99999 PR PBB SHADOW E&M-EST. PATIENT-LVL IV: ICD-10-PCS | Mod: PBBFAC,,, | Performed by: INTERNAL MEDICINE

## 2023-08-15 PROCEDURE — 1159F PR MEDICATION LIST DOCUMENTED IN MEDICAL RECORD: ICD-10-PCS | Mod: CPTII,,, | Performed by: FAMILY MEDICINE

## 2023-08-15 PROCEDURE — 99999 PR PBB SHADOW E&M-EST. PATIENT-LVL IV: CPT | Mod: PBBFAC,,, | Performed by: INTERNAL MEDICINE

## 2023-08-15 PROCEDURE — 99204 OFFICE O/P NEW MOD 45 MIN: CPT | Mod: 25,S$GLB,, | Performed by: INTERNAL MEDICINE

## 2023-08-15 PROCEDURE — 3051F HG A1C>EQUAL 7.0%<8.0%: CPT | Mod: CPTII,,, | Performed by: FAMILY MEDICINE

## 2023-08-15 PROCEDURE — 3008F PR BODY MASS INDEX (BMI) DOCUMENTED: ICD-10-PCS | Mod: CPTII,S$GLB,, | Performed by: INTERNAL MEDICINE

## 2023-08-15 PROCEDURE — 3051F PR MOST RECENT HEMOGLOBIN A1C LEVEL 7.0 - < 8.0%: ICD-10-PCS | Mod: CPTII,S$GLB,, | Performed by: INTERNAL MEDICINE

## 2023-08-15 PROCEDURE — 1159F PR MEDICATION LIST DOCUMENTED IN MEDICAL RECORD: ICD-10-PCS | Mod: CPTII,S$GLB,, | Performed by: INTERNAL MEDICINE

## 2023-08-15 PROCEDURE — 3078F DIAST BP <80 MM HG: CPT | Mod: CPTII,S$GLB,, | Performed by: INTERNAL MEDICINE

## 2023-08-15 PROCEDURE — 1101F PT FALLS ASSESS-DOCD LE1/YR: CPT | Mod: CPTII,,, | Performed by: FAMILY MEDICINE

## 2023-08-15 PROCEDURE — 1101F PR PT FALLS ASSESS DOC 0-1 FALLS W/OUT INJ PAST YR: ICD-10-PCS | Mod: CPTII,,, | Performed by: FAMILY MEDICINE

## 2023-08-15 PROCEDURE — 3008F BODY MASS INDEX DOCD: CPT | Mod: CPTII,,, | Performed by: FAMILY MEDICINE

## 2023-08-15 PROCEDURE — 3008F BODY MASS INDEX DOCD: CPT | Mod: CPTII,S$GLB,, | Performed by: INTERNAL MEDICINE

## 2023-08-15 PROCEDURE — 3051F PR MOST RECENT HEMOGLOBIN A1C LEVEL 7.0 - < 8.0%: ICD-10-PCS | Mod: CPTII,,, | Performed by: FAMILY MEDICINE

## 2023-08-15 PROCEDURE — 3075F SYST BP GE 130 - 139MM HG: CPT | Mod: CPTII,,, | Performed by: FAMILY MEDICINE

## 2023-08-15 PROCEDURE — 1160F PR REVIEW ALL MEDS BY PRESCRIBER/CLIN PHARMACIST DOCUMENTED: ICD-10-PCS | Mod: CPTII,,, | Performed by: FAMILY MEDICINE

## 2023-08-15 PROCEDURE — 94729 PR C02/MEMBANE DIFFUSE CAPACITY: ICD-10-PCS | Mod: S$GLB,,, | Performed by: INTERNAL MEDICINE

## 2023-08-15 PROCEDURE — 3078F PR MOST RECENT DIASTOLIC BLOOD PRESSURE < 80 MM HG: ICD-10-PCS | Mod: CPTII,S$GLB,, | Performed by: INTERNAL MEDICINE

## 2023-08-15 PROCEDURE — 1160F RVW MEDS BY RX/DR IN RCRD: CPT | Mod: CPTII,S$GLB,, | Performed by: INTERNAL MEDICINE

## 2023-08-15 PROCEDURE — 1101F PT FALLS ASSESS-DOCD LE1/YR: CPT | Mod: CPTII,S$GLB,, | Performed by: INTERNAL MEDICINE

## 2023-08-15 PROCEDURE — 1101F PR PT FALLS ASSESS DOC 0-1 FALLS W/OUT INJ PAST YR: ICD-10-PCS | Mod: CPTII,S$GLB,, | Performed by: INTERNAL MEDICINE

## 2023-08-15 PROCEDURE — 1126F PR PAIN SEVERITY QUANTIFIED, NO PAIN PRESENT: ICD-10-PCS | Mod: CPTII,,, | Performed by: FAMILY MEDICINE

## 2023-08-15 PROCEDURE — 3066F PR DOCUMENTATION OF TREATMENT FOR NEPHROPATHY: ICD-10-PCS | Mod: CPTII,S$GLB,, | Performed by: INTERNAL MEDICINE

## 2023-08-15 PROCEDURE — 1160F PR REVIEW ALL MEDS BY PRESCRIBER/CLIN PHARMACIST DOCUMENTED: ICD-10-PCS | Mod: CPTII,S$GLB,, | Performed by: INTERNAL MEDICINE

## 2023-08-15 PROCEDURE — 3074F SYST BP LT 130 MM HG: CPT | Mod: CPTII,S$GLB,, | Performed by: INTERNAL MEDICINE

## 2023-08-15 PROCEDURE — 3075F PR MOST RECENT SYSTOLIC BLOOD PRESS GE 130-139MM HG: ICD-10-PCS | Mod: CPTII,,, | Performed by: FAMILY MEDICINE

## 2023-08-15 PROCEDURE — 99214 PR OFFICE/OUTPT VISIT, EST, LEVL IV, 30-39 MIN: ICD-10-PCS | Mod: ,,, | Performed by: FAMILY MEDICINE

## 2023-08-15 PROCEDURE — 99214 OFFICE O/P EST MOD 30 MIN: CPT | Mod: ,,, | Performed by: FAMILY MEDICINE

## 2023-08-15 PROCEDURE — 3066F PR DOCUMENTATION OF TREATMENT FOR NEPHROPATHY: ICD-10-PCS | Mod: CPTII,,, | Performed by: FAMILY MEDICINE

## 2023-08-15 PROCEDURE — 3061F PR NEG MICROALBUMINURIA RESULT DOCUMENTED/REVIEW: ICD-10-PCS | Mod: CPTII,S$GLB,, | Performed by: INTERNAL MEDICINE

## 2023-08-15 PROCEDURE — 1126F AMNT PAIN NOTED NONE PRSNT: CPT | Mod: CPTII,,, | Performed by: FAMILY MEDICINE

## 2023-08-15 PROCEDURE — 94060 EVALUATION OF WHEEZING: CPT | Mod: S$GLB,,, | Performed by: INTERNAL MEDICINE

## 2023-08-15 PROCEDURE — 1159F MED LIST DOCD IN RCRD: CPT | Mod: CPTII,S$GLB,, | Performed by: INTERNAL MEDICINE

## 2023-08-15 PROCEDURE — 94729 DIFFUSING CAPACITY: CPT | Mod: S$GLB,,, | Performed by: INTERNAL MEDICINE

## 2023-08-15 PROCEDURE — 99204 PR OFFICE/OUTPT VISIT, NEW, LEVL IV, 45-59 MIN: ICD-10-PCS | Mod: 25,S$GLB,, | Performed by: INTERNAL MEDICINE

## 2023-08-15 PROCEDURE — 3079F DIAST BP 80-89 MM HG: CPT | Mod: CPTII,,, | Performed by: FAMILY MEDICINE

## 2023-08-15 PROCEDURE — 3288F PR FALLS RISK ASSESSMENT DOCUMENTED: ICD-10-PCS | Mod: CPTII,S$GLB,, | Performed by: INTERNAL MEDICINE

## 2023-08-15 PROCEDURE — 3066F NEPHROPATHY DOC TX: CPT | Mod: CPTII,S$GLB,, | Performed by: INTERNAL MEDICINE

## 2023-08-15 PROCEDURE — 1126F AMNT PAIN NOTED NONE PRSNT: CPT | Mod: CPTII,S$GLB,, | Performed by: INTERNAL MEDICINE

## 2023-08-15 PROCEDURE — 3074F PR MOST RECENT SYSTOLIC BLOOD PRESSURE < 130 MM HG: ICD-10-PCS | Mod: CPTII,S$GLB,, | Performed by: INTERNAL MEDICINE

## 2023-08-15 PROCEDURE — 1160F RVW MEDS BY RX/DR IN RCRD: CPT | Mod: CPTII,,, | Performed by: FAMILY MEDICINE

## 2023-08-15 PROCEDURE — 1159F MED LIST DOCD IN RCRD: CPT | Mod: CPTII,,, | Performed by: FAMILY MEDICINE

## 2023-08-15 PROCEDURE — 3008F PR BODY MASS INDEX (BMI) DOCUMENTED: ICD-10-PCS | Mod: CPTII,,, | Performed by: FAMILY MEDICINE

## 2023-08-15 PROCEDURE — 3288F PR FALLS RISK ASSESSMENT DOCUMENTED: ICD-10-PCS | Mod: CPTII,,, | Performed by: FAMILY MEDICINE

## 2023-08-15 PROCEDURE — 94726 PULM FUNCT TST PLETHYSMOGRAP: ICD-10-PCS | Mod: S$GLB,,, | Performed by: INTERNAL MEDICINE

## 2023-08-15 PROCEDURE — 94726 PLETHYSMOGRAPHY LUNG VOLUMES: CPT | Mod: S$GLB,,, | Performed by: INTERNAL MEDICINE

## 2023-08-15 PROCEDURE — 3066F NEPHROPATHY DOC TX: CPT | Mod: CPTII,,, | Performed by: FAMILY MEDICINE

## 2023-08-15 PROCEDURE — 3051F HG A1C>EQUAL 7.0%<8.0%: CPT | Mod: CPTII,S$GLB,, | Performed by: INTERNAL MEDICINE

## 2023-08-15 PROCEDURE — 3288F FALL RISK ASSESSMENT DOCD: CPT | Mod: CPTII,,, | Performed by: FAMILY MEDICINE

## 2023-08-15 PROCEDURE — 3061F NEG MICROALBUMINURIA REV: CPT | Mod: CPTII,,, | Performed by: FAMILY MEDICINE

## 2023-08-15 PROCEDURE — 3079F PR MOST RECENT DIASTOLIC BLOOD PRESSURE 80-89 MM HG: ICD-10-PCS | Mod: CPTII,,, | Performed by: FAMILY MEDICINE

## 2023-08-15 PROCEDURE — 1126F PR PAIN SEVERITY QUANTIFIED, NO PAIN PRESENT: ICD-10-PCS | Mod: CPTII,S$GLB,, | Performed by: INTERNAL MEDICINE

## 2023-08-15 PROCEDURE — 3061F NEG MICROALBUMINURIA REV: CPT | Mod: CPTII,S$GLB,, | Performed by: INTERNAL MEDICINE

## 2023-08-15 PROCEDURE — 3061F PR NEG MICROALBUMINURIA RESULT DOCUMENTED/REVIEW: ICD-10-PCS | Mod: CPTII,,, | Performed by: FAMILY MEDICINE

## 2023-08-15 PROCEDURE — 94060 PR EVAL OF BRONCHOSPASM: ICD-10-PCS | Mod: S$GLB,,, | Performed by: INTERNAL MEDICINE

## 2023-08-15 RX ORDER — SEMAGLUTIDE 0.68 MG/ML
0.5 INJECTION, SOLUTION SUBCUTANEOUS
Qty: 3 ML | Refills: 3 | Status: SHIPPED | OUTPATIENT
Start: 2023-08-15 | End: 2023-09-29 | Stop reason: SDUPTHER

## 2023-08-15 RX ORDER — MUPIROCIN 20 MG/G
OINTMENT TOPICAL 3 TIMES DAILY
Qty: 22 G | Refills: 5 | Status: SHIPPED | OUTPATIENT
Start: 2023-08-15

## 2023-08-15 RX ORDER — LEVOTHYROXINE SODIUM 75 UG/1
75 TABLET ORAL DAILY
Qty: 90 TABLET | Refills: 3 | Status: SHIPPED | OUTPATIENT
Start: 2023-08-15

## 2023-08-15 NOTE — ASSESSMENT & PLAN NOTE
Lab Results   Component Value Date    HGBA1C 7.3 (H) 03/29/2023     Was having hypoglycemic episodes on glimepiride. on ozempic 0.5mg. refill sent to [facility]      Contact clinic for concerns.     Unable to tolerate statin.      Foot exam 2/2023  Eye exam with dr. Zapien.  Will request  Urine micro 1/2023    Encouraged compliance with dmii diet.

## 2023-08-15 NOTE — PROGRESS NOTES
"Subjective:        Patient ID: Claire Ang is a 73 y.o. female.    Chief Complaint: Follow-up (3 month DM follow up /Needs refill of ozempic to [facility] )      Patient presents to the clinic unaccompanied for follow up.  She is due for her wellness visit in february.      She has upcoming appt to establish with pulmonology- dr. Yanes in [place]- today at 1pm.  Urology dr. Alfonso in [place].       she has diabetes.  She reports compliance with her medications.  Her last A1c was 7.3 3/2023. Her last foot exam was 3/2023.  Her ophthalmologist is Dr. denise who told her that she had the beginnings of macular degeneration and was referred to Dr. Vick.   She was previously on metformin but this was stopped after her labs of 2021 showed elevated creatinine.  This was replaced with glipizide but made her nauseous so she stopped. she is currently on glimepiride and is on ozempic 0.5mg weekly.       She has hypertension and hyperlipidemia.  Her cardiologist is Dr. Daigle.  She is not taking statin due to knee pain.  On zetia      She has hypothyroidism and is on Synthroid name brand.  Her calcium has been elevated in the past. Not taking calcium supplement. Sometimes takes tums. Pth, vitamin d and ionized calcium 3/2023 were wnl            Went to [facility] ER 3/2/23 for vomiting. Got IV fluids.   Cmp there calcium was 10.2.   had ct abd done as well which also reported emphysematous changed at lung bases.  Denies sob. Never smoker.  Brother- lung cancer (dx 69). Another brother-  from lung disease (not cancer) at age 78. Dad had asbestosis. Mom dx age 65 from "oat" cell carcinoma.  Mom was smoker. No other relatives smoked. Cxr 3/2023 showed hyperinflation.  We will order PFTs     She presented to the wellness clinic on May 18, 2022.  Due to her weakness and hypotension and abdominal pain she was referred to the emergency room.  Labs and CT scan were done showing cystitis/ UTI.  The patient was given Cipro, Bentyl, " Zofran and tramadol and discharged home.  She has not had to take the Bentyl or tramadol or Zofran.  Reports frequent utis.  Used to see dr. Christopher.  Would like to see another urologist.  Recent urine shows infection and is on macrobid.       She has acid reflux and reports compliance with her PPI and H2 blocker.  Previously she saw Dr. Tavera and is requesting to establish with a new Gastro, Dr. Peng.  Her last colonoscopy was with Dr. tavera 1/16/15  Which showed external hemorrhoids and sigmoid diverticulosis      She has anxiety and depression and follows with Psychiatry, Dr. Aguirre.  She is on Wellbutrin.       She has ckd.  Seeing dr. Robison.  Has follow up  12/2023     she has a history of breast cancer and had a right mastectomy.  She is followed by Dr. Tsang.  Appt 8/20/23 with labs.   She was on tamoxifen.  She wears a prosthetic. mammogram 7/2022 was normal. breast mri 5/2023 wnl. She has had a complete hysterectomy at age 37 her heavy menses and therefore no longer does Pap smears     She has osteoporosis and was on Prolia.  She takes vitamin-D.  does not take calcium due to elevated calcium.  Her last bone density test was 8/2021     she has a personal history of skin cancer.  Her dermatologist is Dr. Trujillo.      She is allergic to sulfa, hydrocodone, meperidine and morphine     She had left cataract surgery with dr. Zapien 2/2023     Got covid 11/2022 while on cruise. Took ivermectin. Is better.         Review of Systems   Constitutional: Negative.    HENT: Negative.     Respiratory: Negative.     Cardiovascular: Negative.    Gastrointestinal: Negative.    Genitourinary: Negative.          Review of patient's allergies indicates:   Allergen Reactions    Hydrocodone bitartrate      Other reaction(s): GI Intolerance  Other reaction(s): GI Intolerance  Acetaminophen hydrocodone bitartrate  Acetaminophen hydrocodone bitartrate      Sulfamethoxazole-trimethoprim      Other reaction(s): stomach  "pain  Other reaction(s): stomach pain      Hydrocodone-acetaminophen Nausea And Vomiting     Other reaction(s): Visual hallucinations  Other reaction(s): Visual hallucinations      Meperidine Nausea And Vomiting     Other reaction(s): GI Intolerance, Visual hallucinations  Other reaction(s): GI Intolerance, Visual hallucinations    Other reaction(s): Vomitting    Morphine Palpitations     Other reaction(s): chest tightness  Other reaction(s): chest tightness        Vitals:    08/15/23 0929   BP: 134/82   BP Location: Left arm   Pulse: 70   Resp: 16   Temp: 98.2 °F (36.8 °C)   TempSrc: Temporal   SpO2: 98%   Weight: 72.3 kg (159 lb 4.8 oz)   Height: 5' 4" (1.626 m)      Social History     Socioeconomic History    Marital status:    Tobacco Use    Smoking status: Never     Passive exposure: Never    Smokeless tobacco: Never   Substance and Sexual Activity    Alcohol use: Not Currently    Drug use: Never   Social History Narrative    ** Merged History Encounter **           Family History   Problem Relation Age of Onset    Cancer Mother     Cancer Brother     Lung disease Brother           Objective:     Physical Exam  Vitals and nursing note reviewed.   Constitutional:       Appearance: Normal appearance. She is normal weight.   HENT:      Head: Normocephalic and atraumatic.      Nose: Nose normal.      Mouth/Throat:      Mouth: Mucous membranes are moist.      Pharynx: Oropharynx is clear.   Eyes:      Extraocular Movements: Extraocular movements intact.   Cardiovascular:      Rate and Rhythm: Normal rate and regular rhythm.      Pulses: Normal pulses.      Heart sounds: Normal heart sounds.   Pulmonary:      Effort: Pulmonary effort is normal.      Breath sounds: Normal breath sounds.   Musculoskeletal:         General: Normal range of motion.      Cervical back: Normal range of motion.   Skin:     General: Skin is warm and dry.   Neurological:      General: No focal deficit present.      Mental Status: She " is alert and oriented to person, place, and time. Mental status is at baseline.   Psychiatric:         Mood and Affect: Mood normal.       Current Outpatient Medications on File Prior to Visit   Medication Sig Dispense Refill    cetirizine (ZYRTEC) 5 MG tablet Take 5 mg by mouth daily as needed.      colchicine (COLCRYS) 0.6 mg tablet Take 2 tablets (1.2 mg) once today, and one tablet (0.6 mg) tomorrow 3 tablet 0    cyanocobalamin, vitamin B-12, 1,000 mcg Subl Take by mouth once daily.      ezetimibe (ZETIA) 10 mg tablet Take 1 tablet (10 mg total) by mouth every evening. 90 tablet 3    FREESTYLE PAULO 14 DAY SENSOR Kit USE TO MONITOR BLOOD SUGAR (DX E11.9) 2 kit 11    metoprolol succinate (TOPROL-XL) 25 MG 24 hr tablet Take 25 mg by mouth Daily.      pantoprazole (PROTONIX) 40 MG tablet Take 40 mg by mouth every morning.      tretinoin, emollient, (RENOVA) 0.02 % Crea Apply 1 application topically once daily. 40 g 0    valACYclovir (VALTREX) 1000 MG tablet Take by mouth as needed.      WELLBUTRIN  mg TBSR 12 hr tablet Take 100 mg by mouth 2 (two) times daily.      [DISCONTINUED] semaglutide (OZEMPIC) 0.25 mg or 0.5 mg (2 mg/3 mL) pen injector Inject 0.5 mg into the skin every 7 days. 3 mL 3    [DISCONTINUED] SYNTHROID 75 mcg tablet Take 1 tablet (75 mcg total) by mouth once daily. 90 tablet 2     No current facility-administered medications on file prior to visit.     Health Maintenance   Topic Date Due    Mammogram  07/21/2023    DEXA Scan  08/19/2023    Eye Exam  09/07/2023    TETANUS VACCINE  02/07/2024 (Originally 9/28/2017)    Shingles Vaccine (2 of 2) 02/07/2024 (Originally 2/2/2021)    Hemoglobin A1c  09/29/2023    Lipid Panel  01/03/2024    Foot Exam  02/07/2024    Colorectal Cancer Screening  02/08/2028    Hepatitis C Screening  Completed      Results for orders placed or performed in visit on 07/25/23   Comprehensive Metabolic Panel   Result Value Ref Range    Sodium Level 140 136 - 145 mmol/L     Potassium Level 4.5 3.5 - 5.1 mmol/L    Chloride 102 98 - 107 mmol/L    Carbon Dioxide 25 23 - 31 mmol/L    Glucose Level 167 (H) 82 - 115 mg/dL    Blood Urea Nitrogen 21.0 (H) 9.8 - 20.1 mg/dL    Creatinine 1.40 (H) 0.55 - 1.02 mg/dL    Calcium Level Total 10.0 8.4 - 10.2 mg/dL    Protein Total 7.6 5.8 - 7.6 gm/dL    Albumin Level 3.9 3.4 - 4.8 g/dL    Globulin 3.7 (H) 2.4 - 3.5 gm/dL    Albumin/Globulin Ratio 1.1 1.1 - 2.0 ratio    Bilirubin Total 0.6 <=1.5 mg/dL    Alkaline Phosphatase 81 40 - 150 unit/L    Alanine Aminotransferase 12 0 - 55 unit/L    Aspartate Aminotransferase 17 5 - 34 unit/L    eGFR 40 mls/min/1.73/m2   PTH, Intact   Result Value Ref Range    Parathyroid Hormone Intact 65.3 8.7 - 77.0 pg/mL   Phosphorus   Result Value Ref Range    Phosphorus Level 3.1 2.3 - 4.7 mg/dL   CBC with Differential   Result Value Ref Range    WBC 9.21 4.50 - 11.50 x10(3)/mcL    RBC 4.11 (L) 4.20 - 5.40 x10(6)/mcL    Hgb 13.0 12.0 - 16.0 g/dL    Hct 39.7 37.0 - 47.0 %    MCV 96.6 (H) 80.0 - 94.0 fL    MCH 31.6 (H) 27.0 - 31.0 pg    MCHC 32.7 (L) 33.0 - 36.0 g/dL    RDW 13.6 11.5 - 17.0 %    Platelet 242 130 - 400 x10(3)/mcL    MPV 8.7 7.4 - 10.4 fL    Neut % 60.0 %    Lymph % 31.6 %    Mono % 5.9 %    Eos % 1.7 %    Basophil % 0.5 %    Lymph # 2.91 0.6 - 4.6 x10(3)/mcL    Neut # 5.52 2.1 - 9.2 x10(3)/mcL    Mono # 0.54 0.1 - 1.3 x10(3)/mcL    Eos # 0.16 0 - 0.9 x10(3)/mcL    Baso # 0.05 <=0.2 x10(3)/mcL    IG# 0.03 0 - 0.04 x10(3)/mcL    IG% 0.3 %    NRBC% 0.0 %   Immunoglobulins (IgG, IgA, IgM) Quantitative   Result Value Ref Range    IgG Level 1,225.00 522.00 - 1,631.00 mg/dL    IgA Level 331.0 69.0 - 517.0 mg/dL    IgM Level 65.0 33.0 - 293.0 mg/dL   Immunoglobulin Free LT Chains Blood   Result Value Ref Range    Kappa Free Light Chain 3.60 (H) 0.3300 - 1.94 mg/dL    Lambda Free Light Chain 2.71 (H) 0.5700 - 2.63 mg/dL    Kappa/Lambda FLC Ratio 1.33 0.2600 - 1.65   Immunofixation & Protein Electrophoresis   Result  Value Ref Range    Protein Total 6.9 5.8 - 7.6 gm/dL    Albumin Level 3.1 (L) 3.4 - 4.8 g/dL    Globulin 3.8 (H) 2.4 - 3.5 gm/dL    Albumin/Globulin Ratio 0.8 (L) 1.1 - 2.0 ratio    Albumin, SPEP 44.91     Alpha 1 Glob % 5.55     Alpha 1 Glob 0.38 gm/dl    Alpha 2 Glob% 13.66     Alpha 2 Glob 0.94 gm/dl    Beta Glob % 18.33     Beta Glob 1.26 gm/dl    Gamma Globulin % 17.55     Gamma Globulin 1.21 gm/dl    M Jono %      M Jono     Pathologist Interpretation   Result Value Ref Range    Pathology Review       SPEP: No M-spike indicative of a monoclonal protein is identified.    STU:  Immunofixation shows a normal pattern of immunoglobulins.    No monoclonal bands are detected.    Pierce Medrano M.D.          Assessment & Plan:     Active Problem List with Overview Notes    Diagnosis Date Noted    Breast cancer screening by mammogram 08/15/2023    Hypercalcemia 03/27/2023    Other emphysema 03/27/2023    Advanced care planning/counseling discussion 02/07/2023    Gout 02/07/2023    Myalgia due to statin 02/07/2023    Stage 3b chronic kidney disease     Chronic cystitis 10/26/2022    Hyperlipidemia associated with type 2 diabetes mellitus 08/26/2022    Hypertension associated with diabetes 08/26/2022    Postmenopausal 08/26/2022    History of skin cancer 08/26/2022    Malignant neoplasm of central portion of right breast in female, estrogen receptor positive 08/05/2022    Acute pain of left shoulder 07/13/2022    Diabetes mellitus 05/26/2022    Hypothyroidism 05/26/2022    Anxiety 05/26/2022    Depressive disorder 05/26/2022    History of breast cancer 05/26/2022    Wellness examination 05/26/2022    Gastroesophageal reflux disease 05/26/2022    Genital herpes simplex 05/26/2022    Malignant neoplasm of skin of upper extremity 05/26/2022    Age-related osteoporosis without current pathological fracture 04/05/2022    Osteoporosis 01/11/2017       1. Type 2 diabetes mellitus with stage 3b chronic kidney disease,  without long-term current use of insulin  Assessment & Plan:  Lab Results   Component Value Date    HGBA1C 7.3 (H) 03/29/2023     Was having hypoglycemic episodes on glimepiride. on ozempic 0.5mg. refill sent to [facility]      Contact clinic for concerns.     Unable to tolerate statin.      Foot exam 2/2023  Eye exam with dr. Zapien.  Will request  Urine micro 1/2023    Encouraged compliance with dmii diet.     Orders:  -     semaglutide (OZEMPIC) 0.25 mg or 0.5 mg (2 mg/3 mL) pen injector; Inject 0.5 mg into the skin every 7 days.  Dispense: 3 mL; Refill: 3    2. Hypertension associated with diabetes    3. Hyperlipidemia associated with type 2 diabetes mellitus    4. Stage 3b chronic kidney disease    5. Breast cancer screening by mammogram  -     Mammo Digital Screening Bilat w/ Kris; Future; Expected date: 08/15/2023    6. Hypothyroidism, unspecified type  -     SYNTHROID 75 mcg tablet; Take 1 tablet (75 mcg total) by mouth once daily.  Dispense: 90 tablet; Refill: 3    7. Postmenopausal  -     DXA Bone Density Axial Skeleton 1 or more sites; Future; Expected date: 08/15/2023    Other orders  -     mupirocin (BACTROBAN) 2 % ointment; Apply topically 3 (three) times daily.  Dispense: 22 g; Refill: 5         Follow up in about 3 months (around 11/15/2023) for diabetes.

## 2023-08-15 NOTE — PROGRESS NOTES
"Subjective:      Patient ID: Claire Ang is a 73 y.o. female.    Chief Complaint: Abnormal CT     73 year old female with history of HLD, HTN, breast cancer, DM2, GERD who presents to Pulmonary clinic for evaluation of abnormal CT. History provided by patient and chart review- Mrs. Ang had a CT abd/pelvis 2023 for n/v which incidentally showed emphysematous changes at the lung bases. Before this she had a CTA chest in [facility] in 2016 which showed "Redemonstrated minimal and scattered fibrotic changes most apparent at the bases with sparse nonsepcific hazy opacities seen on the prior exam." Both CTs are from an outside hospital and I am unable to compare. Mrs. Ang denies shortness of breath, wheezing, or cough. She is routinely able to walk 3 miles on a treadmill at 2.5-3mph without significant dyspnea. She is a never smoker, not previously or currently utilizing inhalers, she does report having pneumonia with associated hypoxia that required admission about 7 years ago as a result of caring for her 's chickens.     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    Objective:     Physical Exam   Constitutional: She is oriented to person, place, and time. She appears well-developed and well-nourished. She is not obese.   Cardiovascular: Normal rate and regular rhythm.   Pulmonary/Chest:   Clear breath sounds bilaterally, normal effort at rest, no breathlessness with conversation   Musculoskeletal:         General: No edema.   Neurological: She is alert and oriented to person, place, and time.   Skin: Skin is warm and dry.   Psychiatric: She has a normal mood and " affect.     Personal Diagnostic Review  As above  No flowsheet data found.     Assessment:     1. Other emphysema         Outpatient Encounter Medications as of 8/15/2023   Medication Sig Dispense Refill    cetirizine (ZYRTEC) 5 MG tablet Take 5 mg by mouth daily as needed.      colchicine (COLCRYS) 0.6 mg tablet Take 2 tablets (1.2 mg) once today, and one tablet (0.6 mg) tomorrow 3 tablet 0    cyanocobalamin, vitamin B-12, 1,000 mcg Subl Take by mouth once daily.      ezetimibe (ZETIA) 10 mg tablet Take 1 tablet (10 mg total) by mouth every evening. 90 tablet 3    FREESTYLE PAULO 14 DAY SENSOR Kit USE TO MONITOR BLOOD SUGAR (DX E11.9) 2 kit 11    metoprolol succinate (TOPROL-XL) 25 MG 24 hr tablet Take 25 mg by mouth Daily.      mupirocin (BACTROBAN) 2 % ointment Apply topically 3 (three) times daily. 22 g 5    pantoprazole (PROTONIX) 40 MG tablet Take 40 mg by mouth every morning.      [] predniSONE (DELTASONE) 10 MG tablet Take 4 tablets (40 mg total) by mouth once daily for 1 day, THEN 3 tablets (30 mg total) once daily for 1 day, THEN 2 tablets (20 mg total) once daily for 1 day, THEN 1 tablet (10 mg total) once daily for 1 day. 10 tablet 0    semaglutide (OZEMPIC) 0.25 mg or 0.5 mg (2 mg/3 mL) pen injector Inject 0.5 mg into the skin every 7 days. 3 mL 3    SYNTHROID 75 mcg tablet Take 1 tablet (75 mcg total) by mouth once daily. 90 tablet 3    tretinoin, emollient, (RENOVA) 0.02 % Crea Apply 1 application topically once daily. 40 g 0    valACYclovir (VALTREX) 1000 MG tablet Take by mouth as needed.      WELLBUTRIN  mg TBSR 12 hr tablet Take 100 mg by mouth 2 (two) times daily.      [DISCONTINUED] semaglutide (OZEMPIC) 0.25 mg or 0.5 mg (2 mg/3 mL) pen injector Inject 0.5 mg into the skin every 7 days. 3 mL 3    [DISCONTINUED] SYNTHROID 75 mcg tablet Take 1 tablet (75 mcg total) by mouth once daily. 90 tablet 2     No facility-administered encounter medications on file as of 8/15/2023.          Plan:     Problem List Items Addressed This Visit          Pulmonary    Other emphysema     - bibasilar emphysematous changes per CT abd/pelvis report (I am unable to view images)  - pt never smoker, no respiratory complaints at this time  - given family hx pulmonary issues- will further evaluate with dedicated CT Chest, PFT, and alpha-1 antitrypsin panel         Relevant Orders    CT Chest Without Contrast    ALPHA 1 ANTITRYPSIN DEFIICIENCY PROFILE     Other Visit Diagnoses       Interstitial pulmonary disease, unspecified    -  Primary          Plan discussed with patient and she expressed understanding, all questions answered. Further evaluation with CT Chest, pft and lab. I will dictate follow up based on results. Thank you for the courtesy of referring Mrs. Ang for evaluation.

## 2023-08-15 NOTE — ASSESSMENT & PLAN NOTE
- bibasilar emphysematous changes per CT abd/pelvis report (I am unable to view images)  - pt never smoker, no respiratory complaints at this time  - given family hx pulmonary issues- will further evaluate with dedicated CT Chest, PFT, and alpha-1 antitrypsin panel

## 2023-08-16 LAB
BRPFT: ABNORMAL
DLCO ADJ PRE: 13.07 ML/(MIN*MMHG) (ref 15.51–26.98)
DLCO SINGLE BREATH LLN: 15.51
DLCO SINGLE BREATH PRE REF: 61.5 %
DLCO SINGLE BREATH REF: 21.24
DLCOC SBVA LLN: 2.84
DLCOC SBVA PRE REF: 80.9 %
DLCOC SBVA REF: 4.28
DLCOC SINGLE BREATH LLN: 15.51
DLCOC SINGLE BREATH PRE REF: 61.5 %
DLCOC SINGLE BREATH REF: 21.24
DLCOVA LLN: 2.84
DLCOVA PRE REF: 80.9 %
DLCOVA PRE: 3.46 ML/(MIN*MMHG*L) (ref 2.84–5.71)
DLCOVA REF: 4.28
DLVAADJ PRE: 3.46 ML/(MIN*MMHG*L) (ref 2.84–5.71)
ERV LLN: -16449.36
ERV PRE REF: 74.4 %
ERV REF: 0.64
FEF 25 75 CHG: 25.9 %
FEF 25 75 LLN: 0.83
FEF 25 75 POST REF: 121.8 %
FEF 25 75 PRE REF: 96.7 %
FEF 25 75 REF: 1.82
FET100 CHG: -13.3 %
FEV1 CHG: 9.4 %
FEV1 FVC CHG: 4 %
FEV1 FVC LLN: 64
FEV1 FVC POST REF: 102.2 %
FEV1 FVC PRE REF: 98.2 %
FEV1 FVC REF: 78
FEV1 LLN: 1.58
FEV1 POST REF: 101.3 %
FEV1 PRE REF: 92.6 %
FEV1 REF: 2.18
FRCPLETH LLN: 1.9
FRCPLETH PREREF: 98.3 %
FRCPLETH REF: 2.72
FVC CHG: 5.1 %
FVC LLN: 2.04
FVC POST REF: 98.3 %
FVC PRE REF: 93.5 %
FVC REF: 2.82
IVC PRE: 2.32 L (ref 2.04–3.59)
IVC SINGLE BREATH LLN: 2.04
IVC SINGLE BREATH PRE REF: 82.3 %
IVC SINGLE BREATH REF: 2.82
MVV LLN: 70
MVV PRE REF: 101.8 %
MVV REF: 83
PEF CHG: 37.9 %
PEF LLN: 3.88
PEF POST REF: 98.6 %
PEF PRE REF: 71.5 %
PEF REF: 5.6
POST FEF 25 75: 2.22 L/S (ref 0.83–2.82)
POST FET 100: 7.25 SEC
POST FEV1 FVC: 79.62 % (ref 64.44–91.4)
POST FEV1: 2.2 L (ref 1.58–2.78)
POST FVC: 2.77 L (ref 2.04–3.59)
POST PEF: 5.52 L/S (ref 3.88–7.32)
PRE DLCO: 13.07 ML/(MIN*MMHG) (ref 15.51–26.98)
PRE ERV: 0.47 L (ref -16449.36–16450.64)
PRE FEF 25 75: 1.76 L/S (ref 0.83–2.82)
PRE FET 100: 8.36 SEC
PRE FEV1 FVC: 76.53 % (ref 64.44–91.4)
PRE FEV1: 2.02 L (ref 1.58–2.78)
PRE FRC PL: 2.68 L
PRE FVC: 2.63 L (ref 2.04–3.59)
PRE MVV: 84 L/MIN (ref 70.16–94.92)
PRE PEF: 4 L/S (ref 3.88–7.32)
PRE RV: 2.2 L (ref 1.51–2.66)
PRE TLC: 4.91 L (ref 3.98–5.96)
RAW LLN: 3.06
RAW PRE REF: 127.5 %
RAW PRE: 3.9 CMH2O*S/L (ref 3.06–3.06)
RAW REF: 3.06
RV LLN: 1.51
RV PRE REF: 105.3 %
RV REF: 2.09
RVTLC LLN: 34
RVTLC PRE REF: 103.9 %
RVTLC PRE: 44.76 % (ref 33.51–52.69)
RVTLC REF: 43
TLC LLN: 3.98
TLC PRE REF: 98.8 %
TLC REF: 4.97
VA PRE: 3.77 L (ref 4.82–4.82)
VA SINGLE BREATH LLN: 4.82
VA SINGLE BREATH PRE REF: 78.3 %
VA SINGLE BREATH REF: 4.82
VC LLN: 2.04
VC PRE REF: 96.2 %
VC PRE: 2.71 L (ref 2.04–3.59)
VC REF: 2.82
VTGRAWPRE: 3.05 L

## 2023-08-18 ENCOUNTER — LAB VISIT (OUTPATIENT)
Dept: LAB | Facility: HOSPITAL | Age: 73
End: 2023-08-18
Attending: INTERNAL MEDICINE
Payer: MEDICARE

## 2023-08-18 DIAGNOSIS — C50.111 MALIGNANT NEOPLASM OF CENTRAL PORTION OF RIGHT BREAST IN FEMALE, ESTROGEN RECEPTOR POSITIVE: ICD-10-CM

## 2023-08-18 DIAGNOSIS — Z17.0 MALIGNANT NEOPLASM OF CENTRAL PORTION OF RIGHT BREAST IN FEMALE, ESTROGEN RECEPTOR POSITIVE: ICD-10-CM

## 2023-08-18 LAB
ALBUMIN SERPL-MCNC: 3.4 G/DL (ref 3.4–4.8)
ALBUMIN/GLOB SERPL: 1.2 RATIO (ref 1.1–2)
ALP SERPL-CCNC: 78 UNIT/L (ref 40–150)
ALT SERPL-CCNC: 12 UNIT/L (ref 0–55)
AST SERPL-CCNC: 14 UNIT/L (ref 5–34)
BASOPHILS # BLD AUTO: 0.03 X10(3)/MCL
BASOPHILS NFR BLD AUTO: 0.4 %
BILIRUB SERPL-MCNC: 0.7 MG/DL
BUN SERPL-MCNC: 14.6 MG/DL (ref 9.8–20.1)
CALCIUM SERPL-MCNC: 9.1 MG/DL (ref 8.4–10.2)
CHLORIDE SERPL-SCNC: 103 MMOL/L (ref 98–107)
CO2 SERPL-SCNC: 30 MMOL/L (ref 23–31)
CREAT SERPL-MCNC: 1.18 MG/DL (ref 0.55–1.02)
EOSINOPHIL # BLD AUTO: 0.24 X10(3)/MCL (ref 0–0.9)
EOSINOPHIL NFR BLD AUTO: 2.8 %
ERYTHROCYTE [DISTWIDTH] IN BLOOD BY AUTOMATED COUNT: 13 % (ref 11.5–17)
GFR SERPLBLD CREATININE-BSD FMLA CKD-EPI: 49 MLS/MIN/1.73/M2
GLOBULIN SER-MCNC: 2.8 GM/DL (ref 2.4–3.5)
GLUCOSE SERPL-MCNC: 137 MG/DL (ref 82–115)
HCT VFR BLD AUTO: 35.5 % (ref 37–47)
HGB BLD-MCNC: 11.5 G/DL (ref 12–16)
IMM GRANULOCYTES # BLD AUTO: 0.02 X10(3)/MCL (ref 0–0.04)
IMM GRANULOCYTES NFR BLD AUTO: 0.2 %
LYMPHOCYTES # BLD AUTO: 2.84 X10(3)/MCL (ref 0.6–4.6)
LYMPHOCYTES NFR BLD AUTO: 33.2 %
MCH RBC QN AUTO: 31.3 PG (ref 27–31)
MCHC RBC AUTO-ENTMCNC: 32.4 G/DL (ref 33–36)
MCV RBC AUTO: 96.5 FL (ref 80–94)
MONOCYTES # BLD AUTO: 0.53 X10(3)/MCL (ref 0.1–1.3)
MONOCYTES NFR BLD AUTO: 6.2 %
NEUTROPHILS # BLD AUTO: 4.9 X10(3)/MCL (ref 2.1–9.2)
NEUTROPHILS NFR BLD AUTO: 57.2 %
PLATELET # BLD AUTO: 205 X10(3)/MCL (ref 130–400)
PMV BLD AUTO: 8.4 FL (ref 7.4–10.4)
POTASSIUM SERPL-SCNC: 4.1 MMOL/L (ref 3.5–5.1)
PROT SERPL-MCNC: 6.2 GM/DL (ref 5.8–7.6)
RBC # BLD AUTO: 3.68 X10(6)/MCL (ref 4.2–5.4)
SODIUM SERPL-SCNC: 141 MMOL/L (ref 136–145)
WBC # SPEC AUTO: 8.56 X10(3)/MCL (ref 4.5–11.5)

## 2023-08-18 PROCEDURE — 36415 COLL VENOUS BLD VENIPUNCTURE: CPT

## 2023-08-18 PROCEDURE — 85025 COMPLETE CBC W/AUTO DIFF WBC: CPT

## 2023-08-18 PROCEDURE — 80053 COMPREHEN METABOLIC PANEL: CPT

## 2023-08-21 ENCOUNTER — HOSPITAL ENCOUNTER (OUTPATIENT)
Dept: RADIOLOGY | Facility: HOSPITAL | Age: 73
Discharge: HOME | End: 2023-08-21
Attending: FAMILY MEDICINE
Payer: MEDICARE

## 2023-08-21 ENCOUNTER — OFFICE VISIT (OUTPATIENT)
Dept: HEMATOLOGY/ONCOLOGY | Facility: CLINIC | Age: 73
End: 2023-08-21
Payer: MEDICARE

## 2023-08-21 VITALS — WEIGHT: 152 LBS | BODY MASS INDEX: 25.33 KG/M2 | HEIGHT: 65 IN

## 2023-08-21 DIAGNOSIS — Z78.0 POSTMENOPAUSAL: ICD-10-CM

## 2023-08-21 DIAGNOSIS — N18.32 STAGE 3B CHRONIC KIDNEY DISEASE: ICD-10-CM

## 2023-08-21 DIAGNOSIS — Z12.31 BREAST CANCER SCREENING BY MAMMOGRAM: ICD-10-CM

## 2023-08-21 DIAGNOSIS — C50.111 MALIGNANT NEOPLASM OF CENTRAL PORTION OF RIGHT BREAST IN FEMALE, ESTROGEN RECEPTOR POSITIVE: Primary | ICD-10-CM

## 2023-08-21 DIAGNOSIS — Z12.31 ENCOUNTER FOR SCREENING MAMMOGRAM FOR BREAST CANCER: ICD-10-CM

## 2023-08-21 DIAGNOSIS — Z17.0 MALIGNANT NEOPLASM OF CENTRAL PORTION OF RIGHT BREAST IN FEMALE, ESTROGEN RECEPTOR POSITIVE: Primary | ICD-10-CM

## 2023-08-21 PROBLEM — Z00.00 WELLNESS EXAMINATION: Status: RESOLVED | Noted: 2022-05-26 | Resolved: 2023-08-21

## 2023-08-21 PROCEDURE — 77081 DEXA BONE DENSITY APPENDICULAR SKELETON: ICD-10-PCS | Mod: 26,,, | Performed by: STUDENT IN AN ORGANIZED HEALTH CARE EDUCATION/TRAINING PROGRAM

## 2023-08-21 PROCEDURE — 77080 DXA BONE DENSITY AXIAL SKELETON 1 OR MORE SITES: ICD-10-PCS | Mod: 26,XU,, | Performed by: STUDENT IN AN ORGANIZED HEALTH CARE EDUCATION/TRAINING PROGRAM

## 2023-08-21 PROCEDURE — 1159F MED LIST DOCD IN RCRD: CPT | Mod: CPTII,95,, | Performed by: NURSE PRACTITIONER

## 2023-08-21 PROCEDURE — 77081 DXA BONE DENSITY APPENDICULR: CPT | Mod: 26,,, | Performed by: STUDENT IN AN ORGANIZED HEALTH CARE EDUCATION/TRAINING PROGRAM

## 2023-08-21 PROCEDURE — 1101F PR PT FALLS ASSESS DOC 0-1 FALLS W/OUT INJ PAST YR: ICD-10-PCS | Mod: CPTII,95,, | Performed by: NURSE PRACTITIONER

## 2023-08-21 PROCEDURE — 77063 BREAST TOMOSYNTHESIS BI: CPT | Mod: 26,52,, | Performed by: RADIOLOGY

## 2023-08-21 PROCEDURE — 1125F PR PAIN SEVERITY QUANTIFIED, PAIN PRESENT: ICD-10-PCS | Mod: CPTII,95,, | Performed by: NURSE PRACTITIONER

## 2023-08-21 PROCEDURE — 3008F BODY MASS INDEX DOCD: CPT | Mod: CPTII,95,, | Performed by: NURSE PRACTITIONER

## 2023-08-21 PROCEDURE — 1159F PR MEDICATION LIST DOCUMENTED IN MEDICAL RECORD: ICD-10-PCS | Mod: CPTII,95,, | Performed by: NURSE PRACTITIONER

## 2023-08-21 PROCEDURE — 3008F PR BODY MASS INDEX (BMI) DOCUMENTED: ICD-10-PCS | Mod: CPTII,95,, | Performed by: NURSE PRACTITIONER

## 2023-08-21 PROCEDURE — 3061F NEG MICROALBUMINURIA REV: CPT | Mod: CPTII,95,, | Performed by: NURSE PRACTITIONER

## 2023-08-21 PROCEDURE — 77067 SCR MAMMO BI INCL CAD: CPT | Mod: 26,52,, | Performed by: RADIOLOGY

## 2023-08-21 PROCEDURE — 1160F RVW MEDS BY RX/DR IN RCRD: CPT | Mod: CPTII,95,, | Performed by: NURSE PRACTITIONER

## 2023-08-21 PROCEDURE — 77080 DXA BONE DENSITY AXIAL: CPT | Mod: 26,XU,, | Performed by: STUDENT IN AN ORGANIZED HEALTH CARE EDUCATION/TRAINING PROGRAM

## 2023-08-21 PROCEDURE — 99212 OFFICE O/P EST SF 10 MIN: CPT | Mod: 95,,, | Performed by: NURSE PRACTITIONER

## 2023-08-21 PROCEDURE — 99212 PR OFFICE/OUTPT VISIT, EST, LEVL II, 10-19 MIN: ICD-10-PCS | Mod: 95,,, | Performed by: NURSE PRACTITIONER

## 2023-08-21 PROCEDURE — 77067 MAMMO DIGITAL SCREENING LEFT WITH TOMO: ICD-10-PCS | Mod: 26,52,, | Performed by: RADIOLOGY

## 2023-08-21 PROCEDURE — 3288F FALL RISK ASSESSMENT DOCD: CPT | Mod: CPTII,95,, | Performed by: NURSE PRACTITIONER

## 2023-08-21 PROCEDURE — 77080 DXA BONE DENSITY AXIAL: CPT | Mod: XU,TC

## 2023-08-21 PROCEDURE — 3066F NEPHROPATHY DOC TX: CPT | Mod: CPTII,95,, | Performed by: NURSE PRACTITIONER

## 2023-08-21 PROCEDURE — 1101F PT FALLS ASSESS-DOCD LE1/YR: CPT | Mod: CPTII,95,, | Performed by: NURSE PRACTITIONER

## 2023-08-21 PROCEDURE — 77067 SCR MAMMO BI INCL CAD: CPT | Mod: TC,52

## 2023-08-21 PROCEDURE — 3061F PR NEG MICROALBUMINURIA RESULT DOCUMENTED/REVIEW: ICD-10-PCS | Mod: CPTII,95,, | Performed by: NURSE PRACTITIONER

## 2023-08-21 PROCEDURE — 3288F PR FALLS RISK ASSESSMENT DOCUMENTED: ICD-10-PCS | Mod: CPTII,95,, | Performed by: NURSE PRACTITIONER

## 2023-08-21 PROCEDURE — 3066F PR DOCUMENTATION OF TREATMENT FOR NEPHROPATHY: ICD-10-PCS | Mod: CPTII,95,, | Performed by: NURSE PRACTITIONER

## 2023-08-21 PROCEDURE — 1125F AMNT PAIN NOTED PAIN PRSNT: CPT | Mod: CPTII,95,, | Performed by: NURSE PRACTITIONER

## 2023-08-21 PROCEDURE — 3051F HG A1C>EQUAL 7.0%<8.0%: CPT | Mod: CPTII,95,, | Performed by: NURSE PRACTITIONER

## 2023-08-21 PROCEDURE — 77081 DXA BONE DENSITY APPENDICULR: CPT | Mod: TC

## 2023-08-21 PROCEDURE — 77063 MAMMO DIGITAL SCREENING LEFT WITH TOMO: ICD-10-PCS | Mod: 26,52,, | Performed by: RADIOLOGY

## 2023-08-21 PROCEDURE — 1160F PR REVIEW ALL MEDS BY PRESCRIBER/CLIN PHARMACIST DOCUMENTED: ICD-10-PCS | Mod: CPTII,95,, | Performed by: NURSE PRACTITIONER

## 2023-08-21 PROCEDURE — 3051F PR MOST RECENT HEMOGLOBIN A1C LEVEL 7.0 - < 8.0%: ICD-10-PCS | Mod: CPTII,95,, | Performed by: NURSE PRACTITIONER

## 2023-08-21 RX ORDER — AMOXICILLIN AND CLAVULANATE POTASSIUM 875; 125 MG/1; MG/1
1 TABLET, FILM COATED ORAL 2 TIMES DAILY
COMMUNITY
Start: 2023-08-17 | End: 2023-11-03 | Stop reason: ALTCHOICE

## 2023-08-23 NOTE — PROGRESS NOTES
Please inform patient of normal MMG. Repeat in 1 year.     Due to her breast cancer history, she is eligible for breast MRI.  If she would like to do, we can schedule for 2/2024. Please let me know if she would like me to place order

## 2023-08-28 ENCOUNTER — TELEPHONE (OUTPATIENT)
Dept: FAMILY MEDICINE | Facility: CLINIC | Age: 73
End: 2023-08-28
Payer: MEDICARE

## 2023-08-28 DIAGNOSIS — M54.9 BACK PAIN, UNSPECIFIED BACK LOCATION, UNSPECIFIED BACK PAIN LATERALITY, UNSPECIFIED CHRONICITY: Primary | ICD-10-CM

## 2023-08-28 NOTE — TELEPHONE ENCOUNTER
I have signed for the following orders AND/OR meds.  Please call the patient and ask the patient to schedule the testing AND/OR inform about any medications that were sent.      Orders Placed This Encounter   Procedures    Ambulatory referral/consult to Physical/Occupational Therapy     Standing Status:   Future     Standing Expiration Date:   9/28/2024     Referral Priority:   Routine     Referral Type:   Physical Medicine     Referral Reason:   Specialty Services Required     Referred to Provider:   [facility]     Number of Visits Requested:   1

## 2023-08-28 NOTE — TELEPHONE ENCOUNTER
Dexa shows osteopenia.  Encourage calcium and vit d supplementation.  Encourage weight bearing exercises.  No fractures.  Repeat dexa due 8/2025   Sec & Uns malignant svetlana lymph nodes head face and neck Otezla Pregnancy And Lactation Text: This medication is Pregnancy Category C and it isn't known if it is safe during pregnancy. It is unknown if it is excreted in breast milk.

## 2023-09-29 ENCOUNTER — TELEPHONE (OUTPATIENT)
Dept: FAMILY MEDICINE | Facility: CLINIC | Age: 73
End: 2023-09-29
Payer: MEDICARE

## 2023-09-29 DIAGNOSIS — N18.32 TYPE 2 DIABETES MELLITUS WITH STAGE 3B CHRONIC KIDNEY DISEASE, WITHOUT LONG-TERM CURRENT USE OF INSULIN: ICD-10-CM

## 2023-09-29 DIAGNOSIS — E11.22 TYPE 2 DIABETES MELLITUS WITH STAGE 3B CHRONIC KIDNEY DISEASE, WITHOUT LONG-TERM CURRENT USE OF INSULIN: ICD-10-CM

## 2023-09-29 RX ORDER — SEMAGLUTIDE 0.68 MG/ML
0.5 INJECTION, SOLUTION SUBCUTANEOUS
Qty: 3 ML | Refills: 0 | Status: SHIPPED | OUTPATIENT
Start: 2023-09-29 | End: 2023-11-02 | Stop reason: SDUPTHER

## 2023-09-29 NOTE — TELEPHONE ENCOUNTER
LVM for pt to return call    Epic cancels the same med order when placed to multiple pharmacies. Called to notify pt to  script from local pharmacy hen to call us when she does so we can send rx to mail order.

## 2023-09-29 NOTE — TELEPHONE ENCOUNTER
----- Message from Saida Mahmood sent at 9/29/2023  8:54 AM CDT -----  Regarding: med refill  .Type:  RX Refill Request    Who Called: Pt  Refill or New Rx:Refill  RX Name and Strength:semaglutide (OZEMPIC) 0.25 mg or 0.5 mg (2 mg/3 mL) pen injector  How is the patient currently taking it? (ex. 1XDay):  Is this a 30 day or 90 day RX:  Preferred Pharmacy with phone number:[facility] [address] [place] [address]  Local or Mail Order:Local  Ordering Provider:Izzy  Would the patient rather a call back or a response via [facility]? Call back  Best Call Back Number:[phone]  Additional Information: Pt is completely out.

## 2023-09-29 NOTE — TELEPHONE ENCOUNTER
----- Message from Marquis Holden LPN sent at 9/29/2023 10:06 AM CDT -----  Regarding: FW: med refill  FYI...  ----- Message -----  From: Saida Mahmood  Sent: 9/29/2023   9:25 AM CDT  To: [facility] Staff  Subject: med refill                                       .Type:  RX Refill Request    Who Called: Pt  Refill or New Rx:Refill  RX Name and Strength:semaglutide (OZEMPIC) 0.25 mg or 0.5 mg (2 mg/3 mL) pen injector  How is the patient currently taking it? (ex. 1XDay):  Is this a 30 day or 90 day RX:  Preferred Pharmacy with phone number:[facility] [place] [id number]  Local or Mail Order:local  Ordering Provider:Gómez  Would the patient rather a call back or a response via [facility]? Call back  Best Call Back Number:[phone]  Additional Information: Pt needs only ONE PEN sent to [facility] pharmacy, needs a 3 mth supply to go to [facility]. Pt called back right after meds were called in and stated this is how her insurance wants it to be handled.

## 2023-10-05 ENCOUNTER — HOSPITAL ENCOUNTER (OUTPATIENT)
Dept: RADIOLOGY | Facility: HOSPITAL | Age: 73
Discharge: HOME | End: 2023-10-05
Attending: NURSE PRACTITIONER
Payer: MEDICARE

## 2023-10-05 VITALS — WEIGHT: 136 LBS | BODY MASS INDEX: 25.03 KG/M2 | HEIGHT: 62 IN

## 2023-10-05 DIAGNOSIS — Z12.31 ENCOUNTER FOR SCREENING MAMMOGRAM FOR MALIGNANT NEOPLASM OF BREAST: ICD-10-CM

## 2023-10-05 PROCEDURE — 77067 SCR MAMMO BI INCL CAD: CPT | Mod: TC

## 2023-10-10 ENCOUNTER — TELEPHONE (OUTPATIENT)
Dept: FAMILY MEDICINE | Facility: CLINIC | Age: 73
End: 2023-10-10
Payer: MEDICARE

## 2023-11-02 DIAGNOSIS — N18.32 TYPE 2 DIABETES MELLITUS WITH STAGE 3B CHRONIC KIDNEY DISEASE, WITHOUT LONG-TERM CURRENT USE OF INSULIN: ICD-10-CM

## 2023-11-02 DIAGNOSIS — E11.22 TYPE 2 DIABETES MELLITUS WITH STAGE 3B CHRONIC KIDNEY DISEASE, WITHOUT LONG-TERM CURRENT USE OF INSULIN: ICD-10-CM

## 2023-11-02 RX ORDER — SEMAGLUTIDE 0.68 MG/ML
0.5 INJECTION, SOLUTION SUBCUTANEOUS
Qty: 3 ML | Refills: 5 | Status: SHIPPED | OUTPATIENT
Start: 2023-11-02 | End: 2023-11-03 | Stop reason: SDUPTHER

## 2023-11-03 ENCOUNTER — OFFICE VISIT (OUTPATIENT)
Dept: FAMILY MEDICINE | Facility: CLINIC | Age: 73
End: 2023-11-03
Payer: MEDICARE

## 2023-11-03 VITALS
OXYGEN SATURATION: 98 % | SYSTOLIC BLOOD PRESSURE: 132 MMHG | RESPIRATION RATE: 20 BRPM | WEIGHT: 150.13 LBS | TEMPERATURE: 98 F | HEART RATE: 76 BPM | BODY MASS INDEX: 25.63 KG/M2 | DIASTOLIC BLOOD PRESSURE: 70 MMHG | HEIGHT: 64 IN

## 2023-11-03 DIAGNOSIS — E11.22 TYPE 2 DIABETES MELLITUS WITH STAGE 3B CHRONIC KIDNEY DISEASE, WITHOUT LONG-TERM CURRENT USE OF INSULIN: ICD-10-CM

## 2023-11-03 DIAGNOSIS — Z09 HOSPITAL DISCHARGE FOLLOW-UP: Primary | ICD-10-CM

## 2023-11-03 DIAGNOSIS — N18.32 TYPE 2 DIABETES MELLITUS WITH STAGE 3B CHRONIC KIDNEY DISEASE, WITHOUT LONG-TERM CURRENT USE OF INSULIN: ICD-10-CM

## 2023-11-03 PROCEDURE — 1126F AMNT PAIN NOTED NONE PRSNT: CPT | Mod: CPTII,,, | Performed by: NURSE PRACTITIONER

## 2023-11-03 PROCEDURE — 3008F PR BODY MASS INDEX (BMI) DOCUMENTED: ICD-10-PCS | Mod: CPTII,,, | Performed by: NURSE PRACTITIONER

## 2023-11-03 PROCEDURE — 3008F BODY MASS INDEX DOCD: CPT | Mod: CPTII,,, | Performed by: NURSE PRACTITIONER

## 2023-11-03 PROCEDURE — 1101F PT FALLS ASSESS-DOCD LE1/YR: CPT | Mod: CPTII,,, | Performed by: NURSE PRACTITIONER

## 2023-11-03 PROCEDURE — 1160F RVW MEDS BY RX/DR IN RCRD: CPT | Mod: CPTII,,, | Performed by: NURSE PRACTITIONER

## 2023-11-03 PROCEDURE — 3051F PR MOST RECENT HEMOGLOBIN A1C LEVEL 7.0 - < 8.0%: ICD-10-PCS | Mod: CPTII,,, | Performed by: NURSE PRACTITIONER

## 2023-11-03 PROCEDURE — 3061F NEG MICROALBUMINURIA REV: CPT | Mod: CPTII,,, | Performed by: NURSE PRACTITIONER

## 2023-11-03 PROCEDURE — 3288F PR FALLS RISK ASSESSMENT DOCUMENTED: ICD-10-PCS | Mod: CPTII,,, | Performed by: NURSE PRACTITIONER

## 2023-11-03 PROCEDURE — 3078F DIAST BP <80 MM HG: CPT | Mod: CPTII,,, | Performed by: NURSE PRACTITIONER

## 2023-11-03 PROCEDURE — 1160F PR REVIEW ALL MEDS BY PRESCRIBER/CLIN PHARMACIST DOCUMENTED: ICD-10-PCS | Mod: CPTII,,, | Performed by: NURSE PRACTITIONER

## 2023-11-03 PROCEDURE — 3075F PR MOST RECENT SYSTOLIC BLOOD PRESS GE 130-139MM HG: ICD-10-PCS | Mod: CPTII,,, | Performed by: NURSE PRACTITIONER

## 2023-11-03 PROCEDURE — 3288F FALL RISK ASSESSMENT DOCD: CPT | Mod: CPTII,,, | Performed by: NURSE PRACTITIONER

## 2023-11-03 PROCEDURE — 3066F NEPHROPATHY DOC TX: CPT | Mod: CPTII,,, | Performed by: NURSE PRACTITIONER

## 2023-11-03 PROCEDURE — 1159F PR MEDICATION LIST DOCUMENTED IN MEDICAL RECORD: ICD-10-PCS | Mod: CPTII,,, | Performed by: NURSE PRACTITIONER

## 2023-11-03 PROCEDURE — 1101F PR PT FALLS ASSESS DOC 0-1 FALLS W/OUT INJ PAST YR: ICD-10-PCS | Mod: CPTII,,, | Performed by: NURSE PRACTITIONER

## 2023-11-03 PROCEDURE — 3078F PR MOST RECENT DIASTOLIC BLOOD PRESSURE < 80 MM HG: ICD-10-PCS | Mod: CPTII,,, | Performed by: NURSE PRACTITIONER

## 2023-11-03 PROCEDURE — 3051F HG A1C>EQUAL 7.0%<8.0%: CPT | Mod: CPTII,,, | Performed by: NURSE PRACTITIONER

## 2023-11-03 PROCEDURE — 99214 OFFICE O/P EST MOD 30 MIN: CPT | Mod: ,,, | Performed by: NURSE PRACTITIONER

## 2023-11-03 PROCEDURE — 3066F PR DOCUMENTATION OF TREATMENT FOR NEPHROPATHY: ICD-10-PCS | Mod: CPTII,,, | Performed by: NURSE PRACTITIONER

## 2023-11-03 PROCEDURE — 3061F PR NEG MICROALBUMINURIA RESULT DOCUMENTED/REVIEW: ICD-10-PCS | Mod: CPTII,,, | Performed by: NURSE PRACTITIONER

## 2023-11-03 PROCEDURE — 1126F PR PAIN SEVERITY QUANTIFIED, NO PAIN PRESENT: ICD-10-PCS | Mod: CPTII,,, | Performed by: NURSE PRACTITIONER

## 2023-11-03 PROCEDURE — 1159F MED LIST DOCD IN RCRD: CPT | Mod: CPTII,,, | Performed by: NURSE PRACTITIONER

## 2023-11-03 PROCEDURE — 3075F SYST BP GE 130 - 139MM HG: CPT | Mod: CPTII,,, | Performed by: NURSE PRACTITIONER

## 2023-11-03 PROCEDURE — 99214 PR OFFICE/OUTPT VISIT, EST, LEVL IV, 30-39 MIN: ICD-10-PCS | Mod: ,,, | Performed by: NURSE PRACTITIONER

## 2023-11-03 RX ORDER — SEMAGLUTIDE 0.68 MG/ML
0.5 INJECTION, SOLUTION SUBCUTANEOUS
Qty: 3 ML | Refills: 5 | Status: SHIPPED | OUTPATIENT
Start: 2023-11-03 | End: 2023-12-21 | Stop reason: SDUPTHER

## 2023-11-03 RX ORDER — AMIODARONE HYDROCHLORIDE 200 MG/1
200 TABLET ORAL DAILY
COMMUNITY
Start: 2023-10-31

## 2023-11-03 RX ORDER — ATORVASTATIN CALCIUM 40 MG/1
40 TABLET, FILM COATED ORAL NIGHTLY
COMMUNITY
Start: 2023-10-09

## 2023-11-03 RX ORDER — SEMAGLUTIDE 0.68 MG/ML
0.5 INJECTION, SOLUTION SUBCUTANEOUS
Qty: 3 ML | Refills: 5 | Status: SHIPPED | OUTPATIENT
Start: 2023-11-03 | End: 2023-11-03

## 2023-11-03 RX ORDER — BUPROPION HYDROCHLORIDE 100 MG/1
100 TABLET, FILM COATED ORAL 2 TIMES DAILY
Qty: 180 TABLET | Refills: 2 | Status: SHIPPED | OUTPATIENT
Start: 2023-11-03

## 2023-11-03 RX ORDER — PRASUGREL 10 MG/1
10 TABLET, FILM COATED ORAL DAILY
COMMUNITY

## 2023-11-03 NOTE — ASSESSMENT & PLAN NOTE
S/P  CABG x 4 on 10/20/2023 per Dr. Keller.    Med rec completed  Follow up:  Dr. Daigle 11/2/2023; completed; will be starting Cardiac rehab  Dr. Kellre (Cardiovascular surgeon) 11/9/2023  Instructed to continue to monitor symptoms  Report to ED for any CP, SOB, and/or worsening symptoms  Pt is agreeable to plan and verbalizes understanding

## 2023-11-03 NOTE — PROGRESS NOTES
Subjective:      Patient ID: Claire Ang is a 73 y.o. White female      Chief Complaint: Hospital F/U       Past Medical History:   Diagnosis Date    Anxiety     CAD (coronary artery disease)     Diabetes mellitus     Fibromyalgia     Gastric ulcer     Hypothyroidism     Nephrolithiasis     NSTEMI (non-ST elevated myocardial infarction)     Onychomycosis     Osteoporosis     Paroxysmal atrial fibrillation     Stage 3b chronic kidney disease         HPI  Presents to clinic for hospital discharge follow up.    Initially seen in ED on 10/8/2023 with complaints of CP.  Pt underwent coronary angiogram with coronary stenting x 2.  Discharged home in stable condition.      On 10/11//2023 (three days later), pt was admitted for NSTEMI;  Eventually underwent CABG x 4 on 10/20/2023 per Dr. Keller.  Post-op complicated by A-fib; now on oral amiodarone.    Overall, doing well.  Pain is well controlled.  Denies any CP or SOB.      Follow up:  Dr. Daigle 11/2/2023; completed; will be starting Cardiac rehab  Dr. Keller (Cardiovascular surgeon) 11/9/2023          Patient Care Team:  Jany Magana MD as PCP - General (Family Medicine)  Rakesh Henriquez MD as Consulting Physician (Gastroenterology)  Mino Vick Jr., MD as Consulting Physician (Ophthalmology)  Marlon Reich II, MD as Consulting Physician (Ophthalmology)  Corey Daigle MD as Consulting Physician (Cardiology)  Jasmyne Tsang MD as Consulting Physician (Oncology)  Gordon Trujillo MD (Dermatology)  Stefanie Zapien MD as Consulting Physician (Ophthalmology)  Jagdeep Gamboa MD as Consulting Physician (Urology)  Chino Yanes MD as Consulting Physician (Pulmonary Disease)      Review of Systems   Constitutional: Negative.  Negative for appetite change, chills and fever.   HENT: Negative.     Eyes: Negative.  Negative for discharge and redness.   Respiratory: Negative.  Negative for shortness of breath.    Cardiovascular: Negative.   Negative for chest pain.   Gastrointestinal: Negative.    Endocrine: Negative.    Genitourinary: Negative.    Integumentary:  Negative.   Allergic/Immunologic: Negative.    Neurological: Negative.    Psychiatric/Behavioral: Negative.     All other systems reviewed and are negative.          Objective:      Vitals:    11/03/23 0853   BP: 132/70   Pulse:    Resp:    Temp:       Body mass index is 25.76 kg/m².     Physical Exam  Vitals reviewed.   Constitutional:       Appearance: Normal appearance.   HENT:      Head: Normocephalic.      Mouth/Throat:      Mouth: Mucous membranes are moist.   Eyes:      Conjunctiva/sclera: Conjunctivae normal.      Pupils: Pupils are equal, round, and reactive to light.   Cardiovascular:      Rate and Rhythm: Normal rate and regular rhythm.   Pulmonary:      Effort: Pulmonary effort is normal. No respiratory distress.      Breath sounds: Normal breath sounds.   Musculoskeletal:         General: Normal range of motion.      Cervical back: Normal range of motion and neck supple.   Skin:     General: Skin is warm and dry.   Neurological:      Mental Status: She is alert and oriented to person, place, and time.   Psychiatric:         Mood and Affect: Mood normal.            Current Outpatient Medications:     cetirizine (ZYRTEC) 5 MG tablet, Take 5 mg by mouth daily as needed., Disp: , Rfl:     cyanocobalamin, vitamin B-12, 1,000 mcg Subl, Take by mouth once daily., Disp: , Rfl:     ezetimibe (ZETIA) 10 mg tablet, Take 1 tablet (10 mg total) by mouth every evening., Disp: 90 tablet, Rfl: 3    FREESTYLE PAULO 14 DAY SENSOR Kit, USE TO MONITOR BLOOD SUGAR (DX E11.9), Disp: 2 kit, Rfl: 11    metoprolol succinate (TOPROL-XL) 25 MG 24 hr tablet, Take 25 mg by mouth Daily., Disp: , Rfl:     mupirocin (BACTROBAN) 2 % ointment, Apply topically 3 (three) times daily., Disp: 22 g, Rfl: 5    pantoprazole (PROTONIX) 40 MG tablet, Take 40 mg by mouth every morning., Disp: , Rfl:     prasugreL (EFFIENT)  10 mg Tab, Take 10 mg by mouth once daily., Disp: , Rfl:     SYNTHROID 75 mcg tablet, Take 1 tablet (75 mcg total) by mouth once daily., Disp: 90 tablet, Rfl: 3    valACYclovir (VALTREX) 1000 MG tablet, Take by mouth as needed., Disp: , Rfl:     WELLBUTRIN  mg TBSR 12 hr tablet, Take 1 tablet (100 mg total) by mouth 2 (two) times daily., Disp: 180 tablet, Rfl: 2    amiodarone (PACERONE) 200 MG Tab, Take 200 mg by mouth once daily., Disp: , Rfl:     atorvastatin (LIPITOR) 40 MG tablet, Take 40 mg by mouth every evening., Disp: , Rfl:     semaglutide (OZEMPIC) 0.25 mg or 0.5 mg (2 mg/3 mL) pen injector, Inject 0.5 mg into the skin every 7 days., Disp: 3 mL, Rfl: 5  No current facility-administered medications for this visit.    Assessment & Plan:     Problem List Items Addressed This Visit          Endocrine    Diabetes mellitus    Relevant Medications    semaglutide (OZEMPIC) 0.25 mg or 0.5 mg (2 mg/3 mL) pen injector       Other    Hospital discharge follow-up - Primary     S/P  CABG x 4 on 10/20/2023 per Dr. Keller.    Med rec completed  Follow up:  Dr. Daigle 11/2/2023; completed; will be starting Cardiac rehab  Dr. Keller (Cardiovascular surgeon) 11/9/2023  Instructed to continue to monitor symptoms  Report to ED for any CP, SOB, and/or worsening symptoms  Pt is agreeable to plan and verbalizes understanding                  Prior to the patient's arrival on the same day, I spent (5) minutes reviewing chart. Once in the exam room with the patient, I spent (25 ) minutes in the room with the member performing a history and exam as well as reviewing the test results and recommendations with the patient. After leaving the exam room, I spent an additional (5) minutes completing the electronic health record. The total time spent that day caring for the member is (35) minutes, and this time - including the breakdown - is documented in the medical record.

## 2023-11-22 ENCOUNTER — HOSPITAL ENCOUNTER (OUTPATIENT)
Dept: RADIOLOGY | Facility: HOSPITAL | Age: 73
Discharge: HOME | End: 2023-11-22
Attending: NURSE PRACTITIONER
Payer: MEDICARE

## 2023-11-22 DIAGNOSIS — R92.8 ABNORMAL MAMMOGRAM: ICD-10-CM

## 2023-11-22 DIAGNOSIS — Z12.31 ENCOUNTER FOR SCREENING MAMMOGRAM FOR MALIGNANT NEOPLASM OF BREAST: ICD-10-CM

## 2023-11-22 PROCEDURE — 76642 ULTRASOUND BREAST LIMITED: CPT | Mod: TC,RT

## 2023-12-21 DIAGNOSIS — E11.22 TYPE 2 DIABETES MELLITUS WITH STAGE 3B CHRONIC KIDNEY DISEASE, WITHOUT LONG-TERM CURRENT USE OF INSULIN: ICD-10-CM

## 2023-12-21 DIAGNOSIS — N18.32 TYPE 2 DIABETES MELLITUS WITH STAGE 3B CHRONIC KIDNEY DISEASE, WITHOUT LONG-TERM CURRENT USE OF INSULIN: ICD-10-CM

## 2023-12-21 RX ORDER — SEMAGLUTIDE 0.68 MG/ML
0.5 INJECTION, SOLUTION SUBCUTANEOUS
Qty: 3 ML | Refills: 0 | Status: SHIPPED | OUTPATIENT
Start: 2023-12-21 | End: 2024-01-11 | Stop reason: SDUPTHER

## 2023-12-21 NOTE — TELEPHONE ENCOUNTER
----- Message from Misti Burton sent at 12/21/2023 12:53 PM CST -----  Regarding: refill  Type:  RX Refill Request    Who Called: Claire    Refill or New Rx:refill    RX Name and Strength:semaglutide (OZEMPIC) 0.25 mg or 0.5 mg (2 mg/3 mL) pen injector    How is the patient currently taking it? (ex. 1XDay):    Is this a 30 day or 90 day RX:    Preferred Pharmacy with phone number:[facility]     Local or Mail Order:mail     Ordering Provider:    Would the patient rather a call back or a response via [facility]?     Best Call Back Number:    Additional Information:            Slowed

## 2024-01-11 DIAGNOSIS — E11.22 TYPE 2 DIABETES MELLITUS WITH STAGE 3B CHRONIC KIDNEY DISEASE, WITHOUT LONG-TERM CURRENT USE OF INSULIN: ICD-10-CM

## 2024-01-11 DIAGNOSIS — N18.32 TYPE 2 DIABETES MELLITUS WITH STAGE 3B CHRONIC KIDNEY DISEASE, WITHOUT LONG-TERM CURRENT USE OF INSULIN: ICD-10-CM

## 2024-01-11 RX ORDER — SEMAGLUTIDE 0.68 MG/ML
0.5 INJECTION, SOLUTION SUBCUTANEOUS
Qty: 3 ML | Refills: 11 | Status: SHIPPED | OUTPATIENT
Start: 2024-01-11

## 2024-01-11 NOTE — TELEPHONE ENCOUNTER
----- Message from Aissatou Hollins sent at 1/10/2024  3:23 PM CST -----  Regarding: refill request  Type:  RX Refill Request    Who Called:  pt    Refill or New Rx: refill    RX Name and Strength: semaglutide (OZEMPIC) 0.25 mg or 0.5 mg (2 mg/3 mL) pen injector    How is the patient currently taking it? (ex. 1XDay): clementinak    Is this a 30 day or 90 day RX: unk    Preferred Pharmacy with phone number: [email or web address]  mail order    Local or Mail Order: mail    Ordering Provider: jen    Would the patient rather a call back or a response via [facility]?  Yes    Best Call Back Number: [phone]    Additional Information:  pt says should would like her Rx listed above  sent to ([facility])

## 2024-02-05 PROBLEM — Z09 HOSPITAL DISCHARGE FOLLOW-UP: Status: RESOLVED | Noted: 2023-11-03 | Resolved: 2024-02-05

## 2024-03-11 RX ORDER — FLASH GLUCOSE SENSOR
KIT MISCELLANEOUS
Qty: 2 KIT | Refills: 11 | Status: SHIPPED | OUTPATIENT
Start: 2024-03-11 | End: 2024-03-19 | Stop reason: SDUPTHER

## 2024-03-11 NOTE — TELEPHONE ENCOUNTER
----- Message from Skyler Kramer sent at 3/11/2024  9:31 AM CDT -----  .Type:  Patient Returning Call    Who Called:pt   Who Left Message for Patient:Vani   Does the patient know what this is regarding?:order the adeline monitor   Would the patient rather a call back or a response via [facility]? Call back   Best Call Back Number:[phone]  Additional Information: Pt is requesting that this is called into [facility]

## 2024-03-19 ENCOUNTER — TELEPHONE (OUTPATIENT)
Dept: FAMILY MEDICINE | Facility: CLINIC | Age: 74
End: 2024-03-19
Payer: MEDICARE

## 2024-03-19 DIAGNOSIS — N18.32 TYPE 2 DIABETES MELLITUS WITH STAGE 3B CHRONIC KIDNEY DISEASE, WITHOUT LONG-TERM CURRENT USE OF INSULIN: Primary | ICD-10-CM

## 2024-03-19 DIAGNOSIS — E11.22 TYPE 2 DIABETES MELLITUS WITH STAGE 3B CHRONIC KIDNEY DISEASE, WITHOUT LONG-TERM CURRENT USE OF INSULIN: Primary | ICD-10-CM

## 2024-03-19 RX ORDER — FLASH GLUCOSE SENSOR
KIT MISCELLANEOUS
Qty: 2 KIT | Refills: 11 | Status: SHIPPED | OUTPATIENT
Start: 2024-03-19 | End: 2024-04-23 | Stop reason: SDUPTHER

## 2024-03-19 NOTE — TELEPHONE ENCOUNTER
----- Message from Marquis Navarro sent at 3/19/2024 10:33 AM CDT -----  .Type:  RX Refill Request    Who Called: Claire  Refill or New Rx:  RX Name and Strength:FREESTYLE PAULO 14 DAY SENSOR Kit  How is the patient currently taking it? (ex. 1XDay):  Is this a 30 day or 90 day RX:  Preferred Pharmacy with phone number:[facility]   Local or Mail Order: Local   Ordering Provider: Izzy  Would the patient rather a call back or a response via [facility]?   Best Call Back Number:[phone]  Additional Information: She told me it has been about 2 weeks and she is still waiting for it. Please call when completed.

## 2024-03-26 ENCOUNTER — TELEPHONE (OUTPATIENT)
Dept: FAMILY MEDICINE | Facility: CLINIC | Age: 74
End: 2024-03-26
Payer: MEDICARE

## 2024-03-26 NOTE — TELEPHONE ENCOUNTER
I called the patient's pharmacy to verify if rx was received that was sent on 3/11 and again on 3/19. I was told by pharmacy patient assistant, Lida, that both were received and the rx sent on 3/11 was processed and shipped on 3/18. She said that patient can call for any updates on shipping status.     I called patient to give her this info. She did not answer. I LVM for return call

## 2024-03-26 NOTE — TELEPHONE ENCOUNTER
----- Message from Liilan Jerry sent at 3/26/2024  9:23 AM CDT -----  Regarding: refill  Type:  Needs Medical Advice    Who Called: pt   Would the patient rather a call back or a response via [facility]? C/b   Best Call Back Number: [phone]  Additional Information: pt is calling because the pharmacy is saying that they do not have any refills  for the [place]. Please call to advise.

## 2024-04-23 DIAGNOSIS — E11.22 TYPE 2 DIABETES MELLITUS WITH STAGE 3B CHRONIC KIDNEY DISEASE, WITHOUT LONG-TERM CURRENT USE OF INSULIN: ICD-10-CM

## 2024-04-23 DIAGNOSIS — N18.32 TYPE 2 DIABETES MELLITUS WITH STAGE 3B CHRONIC KIDNEY DISEASE, WITHOUT LONG-TERM CURRENT USE OF INSULIN: ICD-10-CM

## 2024-04-23 RX ORDER — FLASH GLUCOSE SENSOR
KIT MISCELLANEOUS
Qty: 2 KIT | Refills: 11 | Status: SHIPPED | OUTPATIENT
Start: 2024-04-23

## 2024-04-23 NOTE — TELEPHONE ENCOUNTER
----- Message from [facility] sent at 4/23/2024  9:32 AM CDT -----  .Type:  RX Refill Request    Who Called:  pt    Refill or New Rx: refill    RX Name and Strength: FREESTYLE PAULO PEN    How is the patient currently taking it? (ex. 1XDay): as needed    Is this a 30 day or 90 day RX: 90    Preferred Pharmacy with phone number: [facility]    Local or Mail Order: mail    Ordering Provider: Izzy    Would the patient rather a call back or a response via [facility]?      Best Call Back Number: [phone]    Additional Information:  pt needs a refill on her FREESTYLE PAULO PEN

## 2024-05-08 ENCOUNTER — HOSPITAL ENCOUNTER (OUTPATIENT)
Dept: RADIOLOGY | Facility: HOSPITAL | Age: 74
Discharge: HOME | End: 2024-05-08
Attending: INTERNAL MEDICINE
Payer: MEDICARE

## 2024-05-08 DIAGNOSIS — K59.01 SLOW TRANSIT CONSTIPATION: ICD-10-CM

## 2024-05-08 DIAGNOSIS — R10.31 ABDOMINAL PAIN, RIGHT LOWER QUADRANT: ICD-10-CM

## 2024-05-08 PROCEDURE — 74018 RADEX ABDOMEN 1 VIEW: CPT | Mod: TC

## 2024-06-06 ENCOUNTER — LAB VISIT (OUTPATIENT)
Dept: LAB | Facility: HOSPITAL | Age: 74
End: 2024-06-06
Attending: INTERNAL MEDICINE
Payer: MEDICARE

## 2024-06-06 DIAGNOSIS — E78.5 HYPERLIPIDEMIA, UNSPECIFIED HYPERLIPIDEMIA TYPE: Primary | ICD-10-CM

## 2024-06-06 LAB
ALBUMIN SERPL-MCNC: 3.4 G/DL (ref 3.4–4.8)
ALBUMIN/GLOB SERPL: 1.1 RATIO (ref 1.1–2)
ALP SERPL-CCNC: 83 UNIT/L (ref 40–150)
ALT SERPL-CCNC: 12 UNIT/L (ref 0–55)
ANION GAP SERPL CALC-SCNC: 6 MEQ/L
AST SERPL-CCNC: 19 UNIT/L (ref 5–34)
BILIRUB SERPL-MCNC: 0.6 MG/DL
BUN SERPL-MCNC: 22.6 MG/DL (ref 9.8–20.1)
CALCIUM SERPL-MCNC: 9 MG/DL (ref 8.4–10.2)
CHLORIDE SERPL-SCNC: 108 MMOL/L (ref 98–107)
CHOLEST SERPL-MCNC: 115 MG/DL
CHOLEST/HDLC SERPL: 2 {RATIO} (ref 0–5)
CO2 SERPL-SCNC: 27 MMOL/L (ref 23–31)
CREAT SERPL-MCNC: 1.21 MG/DL (ref 0.55–1.02)
CREAT/UREA NIT SERPL: 19
GFR SERPLBLD CREATININE-BSD FMLA CKD-EPI: 48 ML/MIN/1.73/M2
GLOBULIN SER-MCNC: 3 GM/DL (ref 2.4–3.5)
GLUCOSE SERPL-MCNC: 117 MG/DL (ref 82–115)
HDLC SERPL-MCNC: 53 MG/DL (ref 35–60)
LDLC SERPL CALC-MCNC: 53 MG/DL (ref 50–140)
POTASSIUM SERPL-SCNC: 4.3 MMOL/L (ref 3.5–5.1)
PROT SERPL-MCNC: 6.4 GM/DL (ref 5.8–7.6)
SODIUM SERPL-SCNC: 141 MMOL/L (ref 136–145)
TRIGL SERPL-MCNC: 44 MG/DL (ref 37–140)
VLDLC SERPL CALC-MCNC: 9 MG/DL

## 2024-06-06 PROCEDURE — 36415 COLL VENOUS BLD VENIPUNCTURE: CPT

## 2024-06-06 PROCEDURE — 80053 COMPREHEN METABOLIC PANEL: CPT

## 2024-06-06 PROCEDURE — 80061 LIPID PANEL: CPT

## 2024-07-22 ENCOUNTER — TELEPHONE (OUTPATIENT)
Dept: FAMILY MEDICINE | Facility: CLINIC | Age: 74
End: 2024-07-22
Payer: MEDICARE

## 2024-07-22 DIAGNOSIS — E83.52 HYPERCALCEMIA: ICD-10-CM

## 2024-07-22 DIAGNOSIS — E11.59 HYPERTENSION ASSOCIATED WITH DIABETES: ICD-10-CM

## 2024-07-22 DIAGNOSIS — N18.32 TYPE 2 DIABETES MELLITUS WITH STAGE 3B CHRONIC KIDNEY DISEASE, WITHOUT LONG-TERM CURRENT USE OF INSULIN: Primary | ICD-10-CM

## 2024-07-22 DIAGNOSIS — Z00.00 WELLNESS EXAMINATION: ICD-10-CM

## 2024-07-22 DIAGNOSIS — E03.9 HYPOTHYROIDISM, UNSPECIFIED TYPE: ICD-10-CM

## 2024-07-22 DIAGNOSIS — Z78.0 POSTMENOPAUSAL: ICD-10-CM

## 2024-07-22 DIAGNOSIS — E11.69 HYPERLIPIDEMIA ASSOCIATED WITH TYPE 2 DIABETES MELLITUS: ICD-10-CM

## 2024-07-22 DIAGNOSIS — I15.2 HYPERTENSION ASSOCIATED WITH DIABETES: ICD-10-CM

## 2024-07-22 DIAGNOSIS — E11.22 TYPE 2 DIABETES MELLITUS WITH STAGE 3B CHRONIC KIDNEY DISEASE, WITHOUT LONG-TERM CURRENT USE OF INSULIN: Primary | ICD-10-CM

## 2024-07-22 DIAGNOSIS — N18.32 STAGE 3B CHRONIC KIDNEY DISEASE: ICD-10-CM

## 2024-07-22 DIAGNOSIS — E78.5 HYPERLIPIDEMIA ASSOCIATED WITH TYPE 2 DIABETES MELLITUS: ICD-10-CM

## 2024-07-22 RX ORDER — FLASH GLUCOSE SENSOR
KIT MISCELLANEOUS
Qty: 2 KIT | Refills: 11 | Status: SHIPPED | OUTPATIENT
Start: 2024-07-22

## 2024-07-22 NOTE — TELEPHONE ENCOUNTER
----- Message from Saida Mahmood sent at 7/22/2024 11:01 AM CDT -----  Regarding: lab orders/refill  .Who Called: Claire Ang    What order is the patient requesting: Labs   When does the expect the orders to be performed? Ortho Labs    Preferred Method of Contact: Phone Call  Patient's Preferred Phone Number on File: [phone]   Best Call Back Number, if different:  Additional Information: Needs orders pt has wellness July 31 @ 2:00    .Who Called: Claire Ang    Refill or New Rx:Refill  RX Name and Strength:FREESTYLE PAULO 14 DAY SENSOR Kit   How is the patient currently taking it? (ex. 1XDay):  Is this a 30 day or 90 day RX:  Local or Mail Order:Mail   List of preferred pharmacies on file (remove unneeded): [unfilled]  If different Pharmacy is requested, enter Pharmacy information here including location and phone number:  [facility] [place] [facility] [address] [facility] AT [address] TO [facility] SIT[address]  Ordering Provider:Izzy    Preferred Method of Contact: Phone Call  Patient's Preferred Phone Number on File: [phone]   Best Call Back Number, if different:  Additional Information:

## 2024-07-29 ENCOUNTER — LAB VISIT (OUTPATIENT)
Dept: LAB | Facility: HOSPITAL | Age: 74
End: 2024-07-29
Attending: FAMILY MEDICINE
Payer: MEDICARE

## 2024-07-29 DIAGNOSIS — E03.9 HYPOTHYROIDISM, UNSPECIFIED TYPE: Primary | ICD-10-CM

## 2024-07-29 DIAGNOSIS — R82.71 BACTERIURIA: ICD-10-CM

## 2024-07-29 DIAGNOSIS — R82.71 BACTERIURIA: Primary | ICD-10-CM

## 2024-07-29 PROCEDURE — 87186 SC STD MICRODIL/AGAR DIL: CPT

## 2024-07-29 PROCEDURE — 87077 CULTURE AEROBIC IDENTIFY: CPT

## 2024-07-31 ENCOUNTER — OFFICE VISIT (OUTPATIENT)
Dept: FAMILY MEDICINE | Facility: CLINIC | Age: 74
End: 2024-07-31
Payer: MEDICARE

## 2024-07-31 VITALS
WEIGHT: 147.38 LBS | HEIGHT: 64 IN | SYSTOLIC BLOOD PRESSURE: 121 MMHG | TEMPERATURE: 98 F | DIASTOLIC BLOOD PRESSURE: 76 MMHG | RESPIRATION RATE: 19 BRPM | OXYGEN SATURATION: 98 % | HEART RATE: 92 BPM | BODY MASS INDEX: 25.16 KG/M2

## 2024-07-31 DIAGNOSIS — E11.22 TYPE 2 DIABETES MELLITUS WITH STAGE 3B CHRONIC KIDNEY DISEASE, WITHOUT LONG-TERM CURRENT USE OF INSULIN: ICD-10-CM

## 2024-07-31 DIAGNOSIS — Z00.00 WELLNESS EXAMINATION: Primary | ICD-10-CM

## 2024-07-31 DIAGNOSIS — Z71.89 ADVANCED CARE PLANNING/COUNSELING DISCUSSION: ICD-10-CM

## 2024-07-31 DIAGNOSIS — K21.9 GASTROESOPHAGEAL REFLUX DISEASE WITHOUT ESOPHAGITIS: ICD-10-CM

## 2024-07-31 DIAGNOSIS — F32.A DEPRESSIVE DISORDER: ICD-10-CM

## 2024-07-31 DIAGNOSIS — Z85.828 HISTORY OF SKIN CANCER: ICD-10-CM

## 2024-07-31 DIAGNOSIS — E11.69 HYPERLIPIDEMIA ASSOCIATED WITH TYPE 2 DIABETES MELLITUS: ICD-10-CM

## 2024-07-31 DIAGNOSIS — M10.9 GOUT, UNSPECIFIED CAUSE, UNSPECIFIED CHRONICITY, UNSPECIFIED SITE: ICD-10-CM

## 2024-07-31 DIAGNOSIS — I15.2 HYPERTENSION ASSOCIATED WITH DIABETES: ICD-10-CM

## 2024-07-31 DIAGNOSIS — Z85.3 HISTORY OF BREAST CANCER: ICD-10-CM

## 2024-07-31 DIAGNOSIS — E78.5 HYPERLIPIDEMIA ASSOCIATED WITH TYPE 2 DIABETES MELLITUS: ICD-10-CM

## 2024-07-31 DIAGNOSIS — N18.32 STAGE 3B CHRONIC KIDNEY DISEASE: ICD-10-CM

## 2024-07-31 DIAGNOSIS — E11.59 HYPERTENSION ASSOCIATED WITH DIABETES: ICD-10-CM

## 2024-07-31 DIAGNOSIS — J43.8 OTHER EMPHYSEMA: ICD-10-CM

## 2024-07-31 DIAGNOSIS — N39.0 URINARY TRACT INFECTION WITHOUT HEMATURIA, SITE UNSPECIFIED: ICD-10-CM

## 2024-07-31 DIAGNOSIS — N18.32 TYPE 2 DIABETES MELLITUS WITH STAGE 3B CHRONIC KIDNEY DISEASE, WITHOUT LONG-TERM CURRENT USE OF INSULIN: ICD-10-CM

## 2024-07-31 DIAGNOSIS — E03.9 ACQUIRED HYPOTHYROIDISM: ICD-10-CM

## 2024-07-31 DIAGNOSIS — L84 CALLUS OF FOOT: ICD-10-CM

## 2024-07-31 LAB — BACTERIA UR CULT: ABNORMAL

## 2024-07-31 PROCEDURE — 3061F NEG MICROALBUMINURIA REV: CPT | Mod: CPTII,,, | Performed by: NURSE PRACTITIONER

## 2024-07-31 PROCEDURE — 3288F FALL RISK ASSESSMENT DOCD: CPT | Mod: CPTII,,, | Performed by: NURSE PRACTITIONER

## 2024-07-31 PROCEDURE — 1159F MED LIST DOCD IN RCRD: CPT | Mod: CPTII,,, | Performed by: NURSE PRACTITIONER

## 2024-07-31 PROCEDURE — 1126F AMNT PAIN NOTED NONE PRSNT: CPT | Mod: CPTII,,, | Performed by: NURSE PRACTITIONER

## 2024-07-31 PROCEDURE — 3074F SYST BP LT 130 MM HG: CPT | Mod: CPTII,,, | Performed by: NURSE PRACTITIONER

## 2024-07-31 PROCEDURE — 1160F RVW MEDS BY RX/DR IN RCRD: CPT | Mod: CPTII,,, | Performed by: NURSE PRACTITIONER

## 2024-07-31 PROCEDURE — 3078F DIAST BP <80 MM HG: CPT | Mod: CPTII,,, | Performed by: NURSE PRACTITIONER

## 2024-07-31 PROCEDURE — 1100F PTFALLS ASSESS-DOCD GE2>/YR: CPT | Mod: CPTII,,, | Performed by: NURSE PRACTITIONER

## 2024-07-31 PROCEDURE — G0439 PPPS, SUBSEQ VISIT: HCPCS | Mod: ,,, | Performed by: NURSE PRACTITIONER

## 2024-07-31 PROCEDURE — 3044F HG A1C LEVEL LT 7.0%: CPT | Mod: CPTII,,, | Performed by: NURSE PRACTITIONER

## 2024-07-31 PROCEDURE — 3066F NEPHROPATHY DOC TX: CPT | Mod: CPTII,,, | Performed by: NURSE PRACTITIONER

## 2024-07-31 RX ORDER — SEMAGLUTIDE 0.68 MG/ML
0.5 INJECTION, SOLUTION SUBCUTANEOUS
Qty: 9 ML | Refills: 0 | Status: SHIPPED | OUTPATIENT
Start: 2024-07-31

## 2024-07-31 RX ORDER — NITROFURANTOIN 25; 75 MG/1; MG/1
100 CAPSULE ORAL 2 TIMES DAILY
Qty: 10 CAPSULE | Refills: 0 | Status: SHIPPED | OUTPATIENT
Start: 2024-07-31 | End: 2024-08-05

## 2024-07-31 RX ORDER — MUPIROCIN 20 MG/G
OINTMENT TOPICAL 3 TIMES DAILY
Qty: 22 G | Refills: 5 | Status: SHIPPED | OUTPATIENT
Start: 2024-07-31

## 2024-07-31 NOTE — ASSESSMENT & PLAN NOTE
Cora Mitchell D/C'd.  Discharge instructions including the importance of hydration, the use of OTC medications, informations on stress management and the proper follow up recommendations have been provided to the patient/family.  Return precautions given. Questions answered. Verbalized understanding. Pt walked out of ER with family. Pt in NAD, alert and acting age appropriate.      Stable  Keep appt with PCP for follow up

## 2024-07-31 NOTE — ASSESSMENT & PLAN NOTE
Labs previously done and reviewed with patient   Colonoscopy up to date (2/2018)  MMG due and scheduled  DEXA up to date (8/2024); Osteopenia  Tdap; Shingles vaccine due  DM foot exam due; done in office today  DM eye exam due; has appt ([facility]); [place]

## 2024-07-31 NOTE — ASSESSMENT & PLAN NOTE
+ urine culture  Rx Macrobid  Instructed to continue to monitor symptoms  F/U if no improvement  Report to ED for any nausea, vomiting, flank pain, CP, SOB, and/or worsening symptoms  Pt is agreeable to plan and verbalizes understanding

## 2024-07-31 NOTE — ASSESSMENT & PLAN NOTE
Declines Podiatry referral  She will have calluses removed at next pedicure  Instructed to wear shoes at all times; do not walk barefoot  Instructed to inspect feet daily   [< 3 months] : less than 3 months [FreeTextEntry1] : spine lesions [de-identified] : He developed low back pain and hamstring pain. Patient went to the chiropractor. He states that this did not help. When he got no relief he had a MRI done which showed the lesions. He then a a series of testing completed to make sure that the lesions were not "Cancerous".\par \par He then saw Dr. Wan who told him that one of the lesions needed to be removed. \par \par He comes in today seeking a second opinion regarding this surgical option.\par \par Patient currently the patient has low back pain with a shooting pain down his left leg to his foot. He also notes shooting pain in his left foot and toes.\par He has increased pain when he lays down or sits down. He gets relief when he stands and walks.

## 2024-07-31 NOTE — PROGRESS NOTES
Patient ID: [id number]     Chief Complaint: Medicare Wellness and Lab Results      HPI:     Claire Ang is a 74 y.o. female here today for a Medicare Wellness.  Denies any acute problems.      Labs previously done and reviewed with patient   Colonoscopy up to date (2/2018)  MMG due and scheduled  DEXA up to date (8/2024); Osteopenia  Tdap; Shingles vaccine due  DM foot exam due; done in office today  DM eye exam due; has appt ([facility]); November    Opioid Screening: Patient medication list reviewed, patient is not taking prescription opioids. Patient is not using additional opioids than prescribed. Patient is at low risk of substance abuse based on this opioid use history.     Of note, pt will be having a upper/lower GI on Friday.        -------------------------------------    Anxiety    CAD (coronary artery disease)    Diabetes mellitus    Fibromyalgia    Gastric ulcer    Hypothyroidism    Nephrolithiasis    NSTEMI (non-ST elevated myocardial infarction)    Onychomycosis    Osteoporosis    Paroxysmal atrial fibrillation    Stage 3b chronic kidney disease        Past Surgical History:   Procedure Laterality Date    APPENDECTOMY      CHOLECYSTECTOMY      COLONOSCOPY  01/16/2015    Rakesh Henriquez MD    CORONARY ARTERY BYPASS GRAFT  10/20/2023    coronary stents  10/08/2023    HYSTERECTOMY      MASTECTOMY Right     PHACOEMULSIFICATION, CATARACT, WITH IOL INSERTION Left 02/28/2023    Procedure: PHACOEMULSIFICATION, CATARACT, WITH IOL INSERTION- OS;  Surgeon: Stefanie Zapien MD;  Location: [facility];  Service: Ophthalmology;  Laterality: Left;    PLACEMENT-STENT      rt kidney stent      UPPER GASTROINTESTINAL ENDOSCOPY  07/12/2022       Review of patient's allergies indicates:   Allergen Reactions    Hydrocodone bitartrate      Other reaction(s): GI Intolerance  Other reaction(s): GI Intolerance  Acetaminophen hydrocodone bitartrate  Acetaminophen hydrocodone bitartrate      Sulfamethoxazole-trimethoprim       Other reaction(s): stomach pain  Other reaction(s): stomach pain      Hydrocodone-acetaminophen Nausea And Vomiting     Other reaction(s): Visual hallucinations  Other reaction(s): Visual hallucinations      Meperidine Nausea And Vomiting     Other reaction(s): GI Intolerance, Visual hallucinations  Other reaction(s): GI Intolerance, Visual hallucinations    Other reaction(s): Vomitting    Morphine Palpitations     Other reaction(s): chest tightness  Other reaction(s): chest tightness         No outpatient medications have been marked as taking for the 7/31/24 encounter (Office Visit) with Marisela Magaña NP.       Social History     Socioeconomic History    Marital status:    Tobacco Use    Smoking status: Never     Passive exposure: Never    Smokeless tobacco: Never   Substance and Sexual Activity    Alcohol use: Not Currently    Drug use: Never   Social History Narrative    ** Merged History Encounter **          Social Determinants of Health     Financial Resource Strain: Patient Declined (10/8/2023)    [facility]    Overall Financial Resource Strain (CARDIA)     Difficulty of Paying Living Expenses: Patient declined   Food Insecurity: Patient Declined (10/8/2023)    [facility], [facility]    Hunger Vital Sign     Worried About Running Out of Food in the Last Year: Patient declined     Ran Out of Food in the Last Year: Patient declined   Transportation Needs: Patient Declined (10/8/2023)    Received from [facility] Subsidiaries and Affiliates, [facility] Subsidiaries and  Affiliates    PRAPARE - Transportation     Lack of Transportation (Medical): Patient declined     Lack of Transportation (Non-Medical): Patient declined   Stress: Patient Declined (10/8/2023)    [facility], [facility]    [facility] - Occupational Stress Questionnaire     Feeling of Stress : Patient declined        Family History   Problem Relation Name Age of Onset    Cancer Mother      Cancer Brother      Lung disease Brother          Patient Care Team:  Jany Magana MD as PCP - General (Family Medicine)  Rakesh Henriquez MD as Consulting Physician (Gastroenterology)  Mino Vick Jr., MD as Consulting Physician (Ophthalmology)  Marlon Reich II, MD as Consulting Physician (Ophthalmology)  Corey Daigle MD as Consulting Physician (Cardiology)  Jasmyne Tsang MD as Consulting Physician (Oncology)  Gordon Trujillo MD (Dermatology)  Stefanie Zapien MD as Consulting Physician (Ophthalmology)  Jagdeep Gamboa MD as Consulting Physician (Urology)  Chino Yanes MD as Consulting Physician (Pulmonary Disease)  Racheal Robison MD as Consulting Physician (Nephrology)       Subjective:     Review of Systems   All other systems reviewed and are negative.        Patient Reported Health Risk Assessment  [age] 70-79  Are you male or female?: Female  During the past four weeks, how much have you been bothered by emotional problems such as feeling anxious, depressed, irritable, sad, or downhearted and blue?: Not at all  During the past five weeks, has your physical and/or emotional health limited your social activities with family, friends, neighbors, or groups?: Not at all  During the past four weeks, how much bodily pain have you generally had?: No pain  During the past four weeks, was  someone available to help if you needed and wanted help?: Yes, as much as I wanted  During the past four weeks, what was the hardest physical activity you could do for at least two minutes?: Moderate  Can you get to places out of walking distance without help?  (For example, can you travel alone on buses or taxis, or drive your own car?): Yes  Can you go shopping for groceries or clothes without someone's help?: Yes  Can you prepare your own meals?: Yes  Can you do your own housework without help?: Yes  Because of any health problems, do you need the help of another person with your personal care needs such as eating, bathing, dressing, or getting around the house?: No  Can you handle your own money without help?: Yes  During the past four weeks, how would you rate your health in general?: Good  How have things been going for you during the past four weeks?: Pretty well  Are you having difficulties driving your car?: No  Do you always fasten your seat belt when you are in a car?: Yes, usually  How often in the past four weeks have you been bothered by falling or dizzy when standing up?: Never  How often in the past four weeks have you been bothered by trouble eating well?: Never  How often in the past four weeks have you been bothered by teeth or denture problems?: Never  How often in the past four weeks have you been bothered with problems using the telephone?: Never  How often in the past four weeks have you been bothered by tiredness or fatigue?: Seldom  Have you fallen two or more times in the past year?: No  Are you afraid of falling?: No  Are you a smoker?: No  During the past four weeks, how many drinks of wine, beer, or other alcoholic beverages did you have?: No alcohol at all  Do you exercise for about 20 minutes three or more days a week?: No, I usually do not exercise this much  Have you been given any information to help you with hazards in your house that might hurt you?: No  Have you been given any  "information to help you with keeping track of your medications?: No  How often do you have trouble taking medicines the way you've been told to take them?: I always take them as prescribed  How confident are you that you can control and manage most of your health problems?: Very confident  What is your race? (Check all that apply.):     Objective:     /76 (BP Location: Right arm, Patient Position: Sitting, BP Method: Medium (Automatic))   Pulse 92   Temp 98.3 °F (36.8 °C) (Oral)   Resp 19   Ht 5' 4" (1.626 m)   Wt 66.9 kg (147 lb 6.4 oz)   SpO2 98%   BMI 25.30 kg/m²     Physical Exam  Vitals reviewed.   Constitutional:       Appearance: Normal appearance.   HENT:      Head: Normocephalic.      Mouth/Throat:      Mouth: Mucous membranes are moist.   Eyes:      Conjunctiva/sclera: Conjunctivae normal.      Pupils: Pupils are equal, round, and reactive to light.   Cardiovascular:      Rate and Rhythm: Normal rate and regular rhythm.   Pulmonary:      Effort: Pulmonary effort is normal. No respiratory distress.      Breath sounds: Normal breath sounds.   Musculoskeletal:         General: Normal range of motion.      Cervical back: Normal range of motion and neck supple.   Feet:      Comments: Diabetic foot exam:   Left: Sharp/dull discrimination normal   Filament test present  Right: Sharp/dull discrimination normal   Filament test present    General Appearance: dry, warm, skin intact, appropriate hair growth,   Toe Nails: no thick or overgrown toenails  Posterior Tibial Pulse: 2+ bilaterally  Dorsalis Pedis Pulse: 2+ bilaterally  Capillary Refill: < 2 sec bilaterally  Presence of Ulcer: No ulcers bilaterally+ calluses bilateral great toes  Monofilament Testing: Normal, no evidence of peripheral neuropathy bilaterally       Skin:     General: Skin is warm and dry.   Neurological:      Mental Status: She is alert and oriented to person, place, and time.   Psychiatric:         Mood and Affect: Mood " normal.                No data to display                  7/31/2024     2:00 PM 11/3/2023     8:40 AM 8/21/2023    11:00 AM 8/15/2023     1:00 PM 8/15/2023     9:30 AM 8/8/2023    10:30 AM 6/13/2023    10:30 AM   Fall Risk Assessment - Outpatient   Mobility Status Ambulatory Ambulatory Ambulatory Ambulatory Ambulatory Ambulatory Ambulatory   Number of falls 1 with injury 0 0 0 0 0 0   Identified as fall risk True False False False False False False           Depression Screening  Over the past two weeks, has the patient felt down, depressed, or hopeless?: No  Over the past two weeks, has the patient felt little interest or pleasure in doing things?: No  Functional Ability/Safety Screening  Was the patient's timed Up & Go test unsteady or longer than 30 seconds?: No  Does the patient need help with phone, transportation, shopping, preparing meals, housework, laundry, meds, or managing money?: No  Does the patient's home have rugs in the hallway, lack grab bars in the bathroom, lack handrails on the stairs or have poor lighting?: No  Have you noticed any hearing difficulties?: No  Cognitive Function (Assessed through direct observation with due consideration of information obtained by way of patient reports and/or concerns raised by family, friends, caretakers, or others)    Does the patient repeat questions/statements in the same day?: No  Does the patient have trouble remembering the date, year, and time?: No  Does the patient have difficulty managing finances?: No  Does the patient have a decreased sense of direction?: No  Assessment/Plan:     1. Wellness examination  Assessment & Plan:  Labs previously done and reviewed with patient   Colonoscopy up to date (2/2018)  MMG due and scheduled  DEXA up to date (8/2024); Osteopenia  Tdap; Shingles vaccine due  DM foot exam due; done in office today  DM eye exam due; has appt ([facility]); [place]      2. Type 2 diabetes mellitus with stage 3b chronic kidney disease, without  long-term current use of insulin  Assessment & Plan:  Stable; A1C 6.7  Continue Ozempic as prescribed  Keep appt with PCP for follow up      Orders:  -     semaglutide (OZEMPIC) 0.25 mg or 0.5 mg (2 mg/3 mL) pen injector; Inject 0.5 mg into the skin every 7 days.  Dispense: 9 mL; Refill: 0    3. Gastroesophageal reflux disease without esophagitis  Assessment & Plan:  Stable  Continue PPI  Keep appt with PCP for follow up        4. Acquired hypothyroidism  Assessment & Plan:  Stable  Continue Levothyroxine  Keep appt with PCP for follow up        5. Gout, unspecified cause, unspecified chronicity, unspecified site  Assessment & Plan:  Stable  Keep appt with PCP for follow up        6. Advanced care planning/counseling discussion  Assessment & Plan:  Advanced Care Planning:  I attest that I have had a face-to-face discussion with patient       7. History of skin cancer  Assessment & Plan:  Stable  Keep appt with PCP for follow up        8. History of breast cancer  Assessment & Plan:  Stable; MMG scheduled  Keep appt with Oncology for follow up      9. Hyperlipidemia associated with type 2 diabetes mellitus  Assessment & Plan:  Stable  Continue Zetia and Statin  Keep appt with PCP for follow up        10. Hypertension associated with diabetes  Assessment & Plan:  Stable  Continue antihypertensives as prescribed  Keep appt with PCP for follow up        11. Stage 3b chronic kidney disease  Assessment & Plan:  Stable; GFR 51 and improved  Continue current meds  Keep appt with Nephrologist for follow up        12. Depressive disorder  Assessment & Plan:  Stable  Continue Wellbutrin as prescribed  Keep appt with Psych for follow up        13. Other emphysema  Assessment & Plan:  Stable  Keep appt with PCP for follow up        14. Urinary tract infection without hematuria, site unspecified  Assessment & Plan:  + urine culture  Rx Macrobid  Instructed to continue to monitor symptoms  F/U if no improvement  Report to ED for  any nausea, vomiting, flank pain, CP, SOB, and/or worsening symptoms  Pt is agreeable to plan and verbalizes understanding      Orders:  -     nitrofurantoin, macrocrystal-monohydrate, (MACROBID) 100 MG capsule; Take 1 capsule (100 mg total) by mouth 2 (two) times daily. for 5 days  Dispense: 10 capsule; Refill: 0    15. Callus of foot  Assessment & Plan:  Declines Podiatry referral  She will have calluses removed at next pedicure  Instructed to wear shoes at all times; do not walk barefoot  Instructed to inspect feet daily      Other orders  -     mupirocin (BACTROBAN) 2 % ointment; Apply topically 3 (three) times daily.  Dispense: 22 g; Refill: 5           Medicare Annual Wellness and Personalized Prevention Plan:   Fall Risk + Home Safety + Hearing Impairment + Depression Screen + Opioid and Substance Abuse Screening + Cognitive Impairment Screen + Health Risk Assessment all reviewed.     Health Maintenance Topics with due status: Not Due       Topic Last Completion Date    Colorectal Cancer Screening 02/08/2018    DEXA Scan 08/21/2023    Influenza Vaccine 11/02/2023    Diabetes Urine Screening 07/29/2024    Lipid Panel 07/29/2024    Hemoglobin A1c 07/29/2024    Foot Exam 07/31/2024      The patient's Health Maintenance was reviewed and the following appears to be due at this time:   Health Maintenance Due   Topic Date Due    TETANUS VACCINE  09/28/2017    Shingles Vaccine (2 of 2) 02/02/2021    COVID-19 Vaccine (3 - Pfizer risk series) 04/19/2021    Eye Exam  09/07/2023    Mammogram  08/21/2024       Advance Care Planning   I attest to discussing Advance Care Planning with patient and/or family member.  Education was provided including the importance of the Health Care Power of , Advance Directives, and/or [place] documentation.  The patient expressed understanding to the importance of this information and discussion.         Follow up in about 3 months (around 10/31/2024) for F/U with PCP; chronic  illnesses. In addition to their scheduled follow up, the patient has also been instructed to follow up on as needed basis.

## 2024-08-05 ENCOUNTER — DOCUMENTATION ONLY (OUTPATIENT)
Dept: FAMILY MEDICINE | Facility: CLINIC | Age: 74
End: 2024-08-05
Payer: MEDICARE

## 2024-08-05 DIAGNOSIS — E03.9 HYPOTHYROIDISM, UNSPECIFIED TYPE: ICD-10-CM

## 2024-08-05 LAB — CRC RECOMMENDATION EXT: NORMAL

## 2024-08-05 RX ORDER — LEVOTHYROXINE SODIUM 75 UG/1
75 TABLET ORAL
Qty: 90 TABLET | Refills: 0 | Status: SHIPPED | OUTPATIENT
Start: 2024-08-05

## 2024-08-19 DIAGNOSIS — R10.13 ABDOMINAL PAIN, EPIGASTRIC: Primary | ICD-10-CM

## 2024-08-19 DIAGNOSIS — K21.9 GASTROESOPHAGEAL REFLUX DISEASE: ICD-10-CM

## 2024-08-21 PROBLEM — I25.110 CORONARY ARTERY DISEASE INVOLVING NATIVE CORONARY ARTERY OF NATIVE HEART WITH UNSTABLE ANGINA PECTORIS: Status: ACTIVE | Noted: 2023-10-11

## 2024-08-21 PROBLEM — S22.080A COMPRESSION FRACTURE OF T12 VERTEBRA: Status: ACTIVE | Noted: 2024-04-11

## 2024-08-21 PROBLEM — K21.9 GASTROESOPHAGEAL REFLUX DISEASE: Status: ACTIVE | Noted: 2018-02-08

## 2024-08-21 NOTE — PROGRESS NOTES
Subjective:       Patient ID: Claire Ang is a 74 y.o. female.    Surgeon: Dr. Reji Navas  [facility] medical oncologist: Dr. Claire Delaney  Primary care: Dr. Jany Magana    1. Right Breast Cancer stage IB ([id number])--ER+/MD+Her-2neg diagnosed 10/2015  Biopsy/pathology: Excisional biopsy right breast mass UOQ done 8/12/2015--invasive poorly differentiated ductal carcinoma, Irish grade 3, multifocal 18mm and 5mm in greatest dimension respectively, high grade DCIS associated, invasive carcinoma present at margin, ER 97%, MD 64%, Her2 2+ by IHC and non-amplified by FISH, Ki67 68%  Surgery/pathology: Right breast mastectomy with SLN biopsy 9/9/2015--biopsy site changes with no residual in-situ or invasive carcinoma, 1/6 lymph nodes positive for metastatic carcinoma measures only 0.5mm with no extranodal extension, Oncotype DX score 24 (16% chance of distant recurrence with Tamoxifen alone).    DEXA:  5/30/17--AP spine -0.6, Left femoral neck -1.4, right -2.9, left total hip -0.8, -1.7 c/w osteoporosis.  7/23/19--AP spine 0.9, Left femoral neck -1.4, right -2.5, Left total hip -0.9, right -2.1--improved spine, right femur, worse in right and left total hips.  8/19/21--AP spine 0.4, Left femoral neck -1.6, right -2.7, Left total hip -0.9, right -2.0 (mixed response).    Genetic testing BRCA1/2 negative 3/2016.    2. RUE DVT(post-surgical) 9/23/2015--treated with Xarelto (stopped due to hematuria).    Treatment history:  1. Weekly Taxol X 12 and Adriamycin/Cytoxan X 4 cycles completed 4/26/2016.  2. Boniva 11/2016--stopped 2/2017 (unable to tolerate).  3. Femara started 5/17/2016 changed to Arimidex 8/2016 (arthralgias), changed to Aromasin 11/2016 (patient never started).  4. Aromasin 3/26/2018--stopped 5/2018 due to joint problems.  5. Tamoxifen 8/26/18--stopped in 2020 for side effects, patient stopped on her own (unknown dates).  6. Prolia 2/28/17--stopped 8/2022 (due to patient no longer being on  AI).    Procedures:  S/p EGD and colonoscopy on 8/2/24--Grade A esophagitis in the GE junction, mild erythema in the antrum compatible with gastritis, normal mucosa in duodenum. Normal mucosa in the terminal ileum, polyp in the transverse colon. Moderate diverticulosis of the descending and sigmoid colon, grade I/stage I internal hemorrhoids. Repeat in 5 years.     Current treatment: Observation. Stopped Tamoxifen. Refusing to restart or try AI.     Chief Complaint: Other Misc (Pt reports no concerns today.)    HPI   Patient presents for follow-up for breast cancer. She is doing well. Presented at [facility] last week with back pain after an injury with her horse and was sent home with muscle relaxers. She did undergo T12 kyphoplasty with right transpedicular biopsy in April with Dr. Graham (pathology negative). Her back pain is much better. She is still not interested in starting back on Tamoxifen or AI's at this time. Recent MMG on 8/22/24 benign. No other problems reported today.     Past Medical History:   Diagnosis Date    Anxiety     CAD (coronary artery disease)     Diabetes mellitus     Fibromyalgia     Gastric ulcer     Hypothyroidism     Nephrolithiasis     NSTEMI (non-ST elevated myocardial infarction)     Onychomycosis     Osteoporosis     Paroxysmal atrial fibrillation     Stage 3b chronic kidney disease       Review of patient's allergies indicates:   Allergen Reactions    Hydrocodone bitartrate      Other reaction(s): GI Intolerance  Other reaction(s): GI Intolerance  Acetaminophen hydrocodone bitartrate  Acetaminophen hydrocodone bitartrate      Sulfamethoxazole-trimethoprim      Other reaction(s): stomach pain  Other reaction(s): stomach pain      Hydrocodone-acetaminophen Nausea And Vomiting     Other reaction(s): Visual hallucinations  Other reaction(s): Visual hallucinations      Meperidine Nausea And Vomiting     Other reaction(s): GI Intolerance, Visual hallucinations  Other reaction(s): GI  Intolerance, Visual hallucinations    Other reaction(s): Vomitting    Morphine Palpitations     Other reaction(s): chest tightness  Other reaction(s): chest tightness        Current Outpatient Medications on File Prior to Visit   Medication Sig Dispense Refill    atorvastatin (LIPITOR) 40 MG tablet Take 40 mg by mouth every evening.      cetirizine (ZYRTEC) 5 MG tablet Take 5 mg by mouth daily as needed.      cyanocobalamin, vitamin B-12, 1,000 mcg Subl Take by mouth once daily.      ezetimibe (ZETIA) 10 mg tablet Take 1 tablet (10 mg total) by mouth every evening. 90 tablet 3    flash glucose scanning reader Misc 1 each by Misc.(Non-Drug; Combo Route) route Daily. 200 each 11    FREESTYLE PAULO 14 DAY SENSOR Kit USE TO MONITOR BLOOD SUGAR (DX E11.9) 2 kit 11    mupirocin (BACTROBAN) 2 % ointment Apply topically 3 (three) times daily. 22 g 5    semaglutide (OZEMPIC) 0.25 mg or 0.5 mg (2 mg/3 mL) pen injector Inject 0.5 mg into the skin every 7 days. 9 mL 0    SYNTHROID 75 mcg tablet TAKE 1 TABLET ONCE DAILY 90 tablet 0    valACYclovir (VALTREX) 1000 MG tablet Take by mouth as needed.      WELLBUTRIN  mg TBSR 12 hr tablet Take 1 tablet (100 mg total) by mouth 2 (two) times daily. 180 tablet 2    pantoprazole (PROTONIX) 40 MG tablet Take 40 mg by mouth every morning. (Patient not taking: Reported on 8/27/2024)      [DISCONTINUED] amiodarone (PACERONE) 200 MG Tab Take 200 mg by mouth once daily. (Patient not taking: Reported on 8/27/2024)      [DISCONTINUED] metoprolol succinate (TOPROL-XL) 25 MG 24 hr tablet Take 25 mg by mouth Daily.      [DISCONTINUED] prasugreL (EFFIENT) 10 mg Tab Take 10 mg by mouth once daily. (Patient not taking: Reported on 8/27/2024)       No current facility-administered medications on file prior to visit.      Review of Systems   Constitutional:  Negative for appetite change, fatigue, fever and unexpected weight change.   HENT:  Negative for mouth sores.    Eyes: Negative.     Respiratory:  Negative for cough and shortness of breath.    Cardiovascular:  Negative for chest pain and leg swelling.   Gastrointestinal:  Negative for abdominal distention, abdominal pain, constipation, diarrhea, nausea, vomiting and reflux.   Genitourinary:  Negative for difficulty urinating, dysuria and hematuria.        Frequent UTIs   Musculoskeletal:  Negative for arthralgias and back pain.   Integumentary:  Negative for rash.   Neurological:  Negative for weakness and headaches.   Hematological:  Negative for adenopathy.   Psychiatric/Behavioral:  Negative for sleep disturbance. The patient is not nervous/anxious.          Vitals:    08/27/24 1305   BP: 119/76   Pulse: 82   Resp: 14   Temp: 98.1 °F (36.7 °C)      Physical Exam  Vitals reviewed.   Constitutional:       Appearance: Normal appearance. She is overweight.   HENT:      Head: Normocephalic.   Eyes:      General: Vision grossly intact.      Extraocular Movements: Extraocular movements intact.   Cardiovascular:      Rate and Rhythm: Normal rate and regular rhythm.      Heart sounds: Normal heart sounds.   Pulmonary:      Effort: Pulmonary effort is normal.      Breath sounds: Normal breath sounds.   Chest:      Comments: Breast exam deferred- recent MMG.  Abdominal:      General: Abdomen is protuberant. Bowel sounds are normal.      Palpations: Abdomen is soft.   Musculoskeletal:      Cervical back: Normal range of motion and neck supple.      Right lower leg: No edema.      Left lower leg: No edema.   Skin:     General: Skin is warm and moist.      Capillary Refill: Capillary refill takes less than 2 seconds.   Neurological:      General: No focal deficit present.      Mental Status: She is alert and oriented to person, place, and time.   Psychiatric:         Attention and Perception: Attention normal.         Mood and Affect: Mood normal.         Behavior: Behavior is cooperative.         Thought Content: Thought content normal.          Cognition and Memory: Cognition normal.         Judgment: Judgment normal.       Documentation Only on 08/05/2024   Component Date Value    CRC Recommendation Exter* 08/05/2024 Repeat colonoscopy in 5 years    Lab Visit on 07/29/2024   Component Date Value    Urine Culture 07/29/2024 10,000 - 25,000 colonies/ml Escherichia coli (A)    Lab Visit on 07/26/2024   Component Date Value    Sodium 07/29/2024 141     Potassium 07/29/2024 4.6     Chloride 07/29/2024 105     CO2 07/29/2024 28     Glucose 07/29/2024 127 (H)     Blood Urea Nitrogen 07/29/2024 19.0     Creatinine 07/29/2024 1.15 (H)     Calcium 07/29/2024 9.6     Protein Total 07/29/2024 6.9     Albumin 07/29/2024 3.6     Globulin 07/29/2024 3.3     Albumin/Globulin Ratio 07/29/2024 1.1     Bilirubin Total 07/29/2024 0.8     ALP 07/29/2024 104     ALT 07/29/2024 12     AST 07/29/2024 17     eGFR 07/29/2024 51     Anion Gap 07/29/2024 8.0     BUN/Creatinine Ratio 07/29/2024 17     Cholesterol Total 07/29/2024 124     HDL Cholesterol 07/29/2024 61 (H)     Triglyceride 07/29/2024 58     Cholesterol/HDL Ratio 07/29/2024 2     Very Low Density Lipopro* 07/29/2024 12     LDL Cholesterol 07/29/2024 51.00     TSH 07/29/2024 0.261 (L)     Hemoglobin A1c 07/29/2024 6.7     Estimated Average Glucose 07/29/2024 145.6     Urine Microalbumin 07/29/2024 11.0     Urine Creatinine 07/29/2024 105.3     Microalbumin Creatinine * 07/29/2024 10.4     Color, UA 07/29/2024 Yellow     Appearance, UA 07/29/2024 Hazy (A)     Specific [place], UA 07/29/2024 1.020     pH, UA 07/29/2024 6.0     Protein, UA 07/29/2024 Negative     Glucose, UA 07/29/2024 Negative     Ketones, UA 07/29/2024 Negative     Blood, UA 07/29/2024 Small (A)     Bilirubin, UA 07/29/2024 Negative     Urobilinogen, UA 07/29/2024 0.2     Nitrites, UA 07/29/2024 Negative     Leukocyte Esterase, UA 07/29/2024 Small (A)     WBC 07/29/2024 6.60     RBC 07/29/2024 3.67 (L)     Hgb 07/29/2024 12.2     Hct 07/29/2024 36.8 (L)   "   MCV 07/29/2024 100.3 (H)     MCH 07/29/2024 33.2 (H)     MCHC 07/29/2024 33.2     RDW 07/29/2024 12.3     Platelet 07/29/2024 176     MPV 07/29/2024 8.7     Neut % 07/29/2024 61.9     Lymph % 07/29/2024 28.5     Mono % 07/29/2024 6.1     Eos % 07/29/2024 2.7     Basophil % 07/29/2024 0.5     Lymph # 07/29/2024 1.88     Neut # 07/29/2024 4.09     Mono # 07/29/2024 0.40     Eos # 07/29/2024 0.18     Baso # 07/29/2024 0.03     IG# 07/29/2024 0.02     IG% 07/29/2024 0.3     NRBC% 07/29/2024 0.0     Bacteria, UA 07/29/2024 Few (A)     RBC, UA 07/29/2024 0-2     WBC, UA 07/29/2024 6-10 (A)     Squamous Epithelial Cell* 07/29/2024 Few (A)     Thyroxine Free 07/29/2024 1.13           Assessment:       1. Malignant neoplasm of central portion of right breast in female, estrogen receptor positive    2. History of breast cancer    3. Other osteoporosis without current pathological fracture    4. Encounter for screening mammogram for breast cancer         Plan:       Patient with stage IB ([id number]) right breast cancer s/p right mastectomy in 10/2015, intermediate risk oncotype, completed adjuvant chemotherapy with Taxol weekly X 12 and AC every 3 weeks X 4 cycles on 4/26/2016.   Patient was started in Femara 5/2016, changed to Arimidex 8/2016 but patient stopped all endocrine therapy in 11/2016.   Aromasin prescribed in 3/2018 and she took only for a few months then stopped due to "crippling" arthritis.  Discussed Tamoxifen and she started it in 9/2018 but stopped for a little while. She restarted in but stopped in 2020 due to ongoing arthritis and side effects.  She does not wish to restart any other treatment at this time.    Currently patient has not signs or symptoms to suggest recurrence of disease.  Recent labs show resolved anemia with Hgb of 12.2 g/dL. This has been on/off. She also has mild renal insufficiency. Followed by nephrology. She does have PUD that may contribute to her anemia.  Continue MVI with iron " daily.   Still refusing to take Tamoxifen or AI's.   Left screening MMG on 8/22/24 benign with recommendations to repeat in 1 year. Will send orders.   MRI of breasts on 5/3/23 benign with recommendations to alternate every 6 months between MMG. She will be due for MRI of breasts in February 2025. Will send orders.   Will continue routine surveillance visits.  RTC 1 year for follow-up with labs.  Patient has been on and off Prolia. Most recent DEXA from 8/2023 showed mixed response. Instructed her to have her PCP manage her bone health from now on since no longer have her on any bone altering medications.   All questions answered at this time.        AGUSTO Adame

## 2024-08-22 ENCOUNTER — HOSPITAL ENCOUNTER (OUTPATIENT)
Dept: RADIOLOGY | Facility: HOSPITAL | Age: 74
Discharge: HOME | End: 2024-08-22
Attending: NURSE PRACTITIONER
Payer: MEDICARE

## 2024-08-22 DIAGNOSIS — Z12.31 SCREENING MAMMOGRAM FOR HIGH-RISK PATIENT: ICD-10-CM

## 2024-08-22 PROCEDURE — 77063 BREAST TOMOSYNTHESIS BI: CPT | Mod: 26,52,, | Performed by: RADIOLOGY

## 2024-08-22 PROCEDURE — 77067 SCR MAMMO BI INCL CAD: CPT | Mod: 26,52,, | Performed by: RADIOLOGY

## 2024-08-22 PROCEDURE — 77067 SCR MAMMO BI INCL CAD: CPT | Mod: TC,52

## 2024-08-27 ENCOUNTER — OFFICE VISIT (OUTPATIENT)
Dept: HEMATOLOGY/ONCOLOGY | Facility: CLINIC | Age: 74
End: 2024-08-27
Payer: MEDICARE

## 2024-08-27 VITALS
WEIGHT: 149.19 LBS | SYSTOLIC BLOOD PRESSURE: 119 MMHG | OXYGEN SATURATION: 98 % | TEMPERATURE: 98 F | DIASTOLIC BLOOD PRESSURE: 76 MMHG | HEART RATE: 82 BPM | HEIGHT: 64 IN | BODY MASS INDEX: 25.47 KG/M2 | RESPIRATION RATE: 14 BRPM

## 2024-08-27 DIAGNOSIS — C50.111 MALIGNANT NEOPLASM OF CENTRAL PORTION OF RIGHT BREAST IN FEMALE, ESTROGEN RECEPTOR POSITIVE: Primary | ICD-10-CM

## 2024-08-27 DIAGNOSIS — M81.8 OTHER OSTEOPOROSIS WITHOUT CURRENT PATHOLOGICAL FRACTURE: ICD-10-CM

## 2024-08-27 DIAGNOSIS — Z12.31 ENCOUNTER FOR SCREENING MAMMOGRAM FOR BREAST CANCER: ICD-10-CM

## 2024-08-27 DIAGNOSIS — Z17.0 MALIGNANT NEOPLASM OF CENTRAL PORTION OF RIGHT BREAST IN FEMALE, ESTROGEN RECEPTOR POSITIVE: Primary | ICD-10-CM

## 2024-08-27 DIAGNOSIS — Z85.3 HISTORY OF BREAST CANCER: ICD-10-CM

## 2024-08-27 PROCEDURE — 3078F DIAST BP <80 MM HG: CPT | Mod: CPTII,S$GLB,, | Performed by: NURSE PRACTITIONER

## 2024-08-27 PROCEDURE — 3008F BODY MASS INDEX DOCD: CPT | Mod: CPTII,S$GLB,, | Performed by: NURSE PRACTITIONER

## 2024-08-27 PROCEDURE — 1126F AMNT PAIN NOTED NONE PRSNT: CPT | Mod: CPTII,S$GLB,, | Performed by: NURSE PRACTITIONER

## 2024-08-27 PROCEDURE — 3074F SYST BP LT 130 MM HG: CPT | Mod: CPTII,S$GLB,, | Performed by: NURSE PRACTITIONER

## 2024-08-27 PROCEDURE — 3061F NEG MICROALBUMINURIA REV: CPT | Mod: CPTII,S$GLB,, | Performed by: NURSE PRACTITIONER

## 2024-08-27 PROCEDURE — 3288F FALL RISK ASSESSMENT DOCD: CPT | Mod: CPTII,S$GLB,, | Performed by: NURSE PRACTITIONER

## 2024-08-27 PROCEDURE — 99214 OFFICE O/P EST MOD 30 MIN: CPT | Mod: S$GLB,,, | Performed by: NURSE PRACTITIONER

## 2024-08-27 PROCEDURE — 1101F PT FALLS ASSESS-DOCD LE1/YR: CPT | Mod: CPTII,S$GLB,, | Performed by: NURSE PRACTITIONER

## 2024-08-27 PROCEDURE — 3044F HG A1C LEVEL LT 7.0%: CPT | Mod: CPTII,S$GLB,, | Performed by: NURSE PRACTITIONER

## 2024-08-27 PROCEDURE — 99999 PR PBB SHADOW E&M-EST. PATIENT-LVL IV: CPT | Mod: PBBFAC,,, | Performed by: NURSE PRACTITIONER

## 2024-08-27 PROCEDURE — 1159F MED LIST DOCD IN RCRD: CPT | Mod: CPTII,S$GLB,, | Performed by: NURSE PRACTITIONER

## 2024-08-27 PROCEDURE — 3066F NEPHROPATHY DOC TX: CPT | Mod: CPTII,S$GLB,, | Performed by: NURSE PRACTITIONER

## 2024-08-27 PROCEDURE — 1160F RVW MEDS BY RX/DR IN RCRD: CPT | Mod: CPTII,S$GLB,, | Performed by: NURSE PRACTITIONER

## 2024-08-27 RX ORDER — SODIUM CHLORIDE 0.9 % (FLUSH) 0.9 %
10 SYRINGE (ML) INJECTION
OUTPATIENT
Start: 2024-09-10

## 2024-08-27 RX ORDER — HEPARIN 100 UNIT/ML
500 SYRINGE INTRAVENOUS
OUTPATIENT
Start: 2024-09-10

## 2024-09-06 ENCOUNTER — HOSPITAL ENCOUNTER (OUTPATIENT)
Dept: RADIOLOGY | Facility: HOSPITAL | Age: 74
Discharge: HOME | End: 2024-09-06
Attending: INTERNAL MEDICINE
Payer: MEDICARE

## 2024-09-06 DIAGNOSIS — K21.9 GASTROESOPHAGEAL REFLUX DISEASE: ICD-10-CM

## 2024-09-06 DIAGNOSIS — R10.13 ABDOMINAL PAIN, EPIGASTRIC: ICD-10-CM

## 2024-09-06 PROCEDURE — 74177 CT ABD & PELVIS W/CONTRAST: CPT | Mod: TC

## 2024-09-06 PROCEDURE — 25500020 PHARM REV CODE 255: Performed by: INTERNAL MEDICINE

## 2024-09-06 PROCEDURE — A9698 NON-RAD CONTRAST MATERIALNOC: HCPCS | Performed by: INTERNAL MEDICINE

## 2024-09-06 RX ADMIN — IOHEXOL 100 ML: 350 INJECTION, SOLUTION INTRAVENOUS at 04:09

## 2024-09-06 RX ADMIN — BARIUM SULFATE 1350 ML: 1 SUSPENSION ORAL at 04:09

## 2024-09-10 ENCOUNTER — TELEPHONE (OUTPATIENT)
Dept: FAMILY MEDICINE | Facility: CLINIC | Age: 74
End: 2024-09-10
Payer: MEDICARE

## 2024-09-10 NOTE — TELEPHONE ENCOUNTER
Note from dr. Peng office reviewed.  Notes some air in her bladder which is abnormal.  Did she have any recent procedure? Does she see urology? If not, we can refer her.

## 2024-09-10 NOTE — TELEPHONE ENCOUNTER
Spoke with patient she does see Dr. Gamboa in [place]. I reviewed these results with her she denies any recent procedure or surgery. I sent results to Dr. Gamboa office with a message to follow up with patient for eval and I also instructed the patient to contact their office this week to get an appt scheduled. She verbalized understanding

## 2024-09-23 ENCOUNTER — OFFICE VISIT (OUTPATIENT)
Dept: FAMILY MEDICINE | Facility: CLINIC | Age: 74
End: 2024-09-23
Payer: MEDICARE

## 2024-09-23 ENCOUNTER — LAB VISIT (OUTPATIENT)
Dept: LAB | Facility: HOSPITAL | Age: 74
End: 2024-09-23
Attending: NURSE PRACTITIONER
Payer: MEDICARE

## 2024-09-23 VITALS
SYSTOLIC BLOOD PRESSURE: 125 MMHG | WEIGHT: 148.63 LBS | BODY MASS INDEX: 25.38 KG/M2 | HEIGHT: 64 IN | DIASTOLIC BLOOD PRESSURE: 77 MMHG | OXYGEN SATURATION: 97 % | TEMPERATURE: 97 F | RESPIRATION RATE: 18 BRPM | HEART RATE: 95 BPM

## 2024-09-23 DIAGNOSIS — E03.9 ACQUIRED HYPOTHYROIDISM: ICD-10-CM

## 2024-09-23 DIAGNOSIS — R53.83 FATIGUE, UNSPECIFIED TYPE: ICD-10-CM

## 2024-09-23 DIAGNOSIS — Z12.31 ENCOUNTER FOR SCREENING MAMMOGRAM FOR MALIGNANT NEOPLASM OF BREAST: Primary | ICD-10-CM

## 2024-09-23 DIAGNOSIS — R53.83 FATIGUE, UNSPECIFIED TYPE: Primary | ICD-10-CM

## 2024-09-23 LAB
ALBUMIN SERPL-MCNC: 3.7 G/DL (ref 3.4–4.8)
ALBUMIN/GLOB SERPL: 1.1 RATIO (ref 1.1–2)
ALP SERPL-CCNC: 106 UNIT/L (ref 40–150)
ALT SERPL-CCNC: 13 UNIT/L (ref 0–55)
ANION GAP SERPL CALC-SCNC: 10 MEQ/L
AST SERPL-CCNC: 16 UNIT/L (ref 5–34)
BASOPHILS # BLD AUTO: 0.04 X10(3)/MCL
BASOPHILS NFR BLD AUTO: 0.4 %
BILIRUB SERPL-MCNC: 0.7 MG/DL
BUN SERPL-MCNC: 17.6 MG/DL (ref 9.8–20.1)
CALCIUM SERPL-MCNC: 10 MG/DL (ref 8.4–10.2)
CHLORIDE SERPL-SCNC: 105 MMOL/L (ref 98–107)
CO2 SERPL-SCNC: 28 MMOL/L (ref 23–31)
CREAT SERPL-MCNC: 1.31 MG/DL (ref 0.55–1.02)
CREAT/UREA NIT SERPL: 13
EOSINOPHIL # BLD AUTO: 0.15 X10(3)/MCL (ref 0–0.9)
EOSINOPHIL NFR BLD AUTO: 1.7 %
ERYTHROCYTE [DISTWIDTH] IN BLOOD BY AUTOMATED COUNT: 12.7 % (ref 11.5–17)
GFR SERPLBLD CREATININE-BSD FMLA CKD-EPI: 43 ML/MIN/1.73/M2
GLOBULIN SER-MCNC: 3.3 GM/DL (ref 2.4–3.5)
GLUCOSE SERPL-MCNC: 111 MG/DL (ref 82–115)
HCT VFR BLD AUTO: 36.6 % (ref 37–47)
HGB BLD-MCNC: 12.3 G/DL (ref 12–16)
IMM GRANULOCYTES # BLD AUTO: 0.02 X10(3)/MCL (ref 0–0.04)
IMM GRANULOCYTES NFR BLD AUTO: 0.2 %
LYMPHOCYTES # BLD AUTO: 2.83 X10(3)/MCL (ref 0.6–4.6)
LYMPHOCYTES NFR BLD AUTO: 31.4 %
MCH RBC QN AUTO: 33.1 PG (ref 27–31)
MCHC RBC AUTO-ENTMCNC: 33.6 G/DL (ref 33–36)
MCV RBC AUTO: 98.4 FL (ref 80–94)
MONOCYTES # BLD AUTO: 0.63 X10(3)/MCL (ref 0.1–1.3)
MONOCYTES NFR BLD AUTO: 7 %
NEUTROPHILS # BLD AUTO: 5.35 X10(3)/MCL (ref 2.1–9.2)
NEUTROPHILS NFR BLD AUTO: 59.3 %
NRBC BLD AUTO-RTO: 0 %
PLATELET # BLD AUTO: 171 X10(3)/MCL (ref 130–400)
PMV BLD AUTO: 9.4 FL (ref 7.4–10.4)
POTASSIUM SERPL-SCNC: 4.3 MMOL/L (ref 3.5–5.1)
PROT SERPL-MCNC: 7 GM/DL (ref 5.8–7.6)
RBC # BLD AUTO: 3.72 X10(6)/MCL (ref 4.2–5.4)
SODIUM SERPL-SCNC: 143 MMOL/L (ref 136–145)
T4 FREE SERPL-MCNC: 1.24 NG/DL (ref 0.7–1.48)
TSH SERPL-ACNC: 0.21 UIU/ML (ref 0.35–4.94)
WBC # BLD AUTO: 9.02 X10(3)/MCL (ref 4.5–11.5)

## 2024-09-23 PROCEDURE — 84439 ASSAY OF FREE THYROXINE: CPT

## 2024-09-23 PROCEDURE — 3044F HG A1C LEVEL LT 7.0%: CPT | Mod: CPTII,,, | Performed by: NURSE PRACTITIONER

## 2024-09-23 PROCEDURE — 84443 ASSAY THYROID STIM HORMONE: CPT

## 2024-09-23 PROCEDURE — 3066F NEPHROPATHY DOC TX: CPT | Mod: CPTII,,, | Performed by: NURSE PRACTITIONER

## 2024-09-23 PROCEDURE — 1160F RVW MEDS BY RX/DR IN RCRD: CPT | Mod: CPTII,,, | Performed by: NURSE PRACTITIONER

## 2024-09-23 PROCEDURE — 1126F AMNT PAIN NOTED NONE PRSNT: CPT | Mod: CPTII,,, | Performed by: NURSE PRACTITIONER

## 2024-09-23 PROCEDURE — 1101F PT FALLS ASSESS-DOCD LE1/YR: CPT | Mod: CPTII,,, | Performed by: NURSE PRACTITIONER

## 2024-09-23 PROCEDURE — 1159F MED LIST DOCD IN RCRD: CPT | Mod: CPTII,,, | Performed by: NURSE PRACTITIONER

## 2024-09-23 PROCEDURE — 3074F SYST BP LT 130 MM HG: CPT | Mod: CPTII,,, | Performed by: NURSE PRACTITIONER

## 2024-09-23 PROCEDURE — 3061F NEG MICROALBUMINURIA REV: CPT | Mod: CPTII,,, | Performed by: NURSE PRACTITIONER

## 2024-09-23 PROCEDURE — 99213 OFFICE O/P EST LOW 20 MIN: CPT | Mod: ,,, | Performed by: NURSE PRACTITIONER

## 2024-09-23 PROCEDURE — 3008F BODY MASS INDEX DOCD: CPT | Mod: CPTII,,, | Performed by: NURSE PRACTITIONER

## 2024-09-23 PROCEDURE — 80053 COMPREHEN METABOLIC PANEL: CPT

## 2024-09-23 PROCEDURE — 3288F FALL RISK ASSESSMENT DOCD: CPT | Mod: CPTII,,, | Performed by: NURSE PRACTITIONER

## 2024-09-23 PROCEDURE — 36415 COLL VENOUS BLD VENIPUNCTURE: CPT

## 2024-09-23 PROCEDURE — 3078F DIAST BP <80 MM HG: CPT | Mod: CPTII,,, | Performed by: NURSE PRACTITIONER

## 2024-09-23 PROCEDURE — 85025 COMPLETE CBC W/AUTO DIFF WBC: CPT

## 2024-09-23 NOTE — ASSESSMENT & PLAN NOTE
Generalized fatigue  Repeat labs today (including TSH, Free T4/T3)  Will call with results  Keep appt with PCP for follow up  Instructed to continue to monitor symptoms  Report to ED for any CP, SOB, and/or worsening symptoms  Pt is agreeable to plan and verbalizes understanding

## 2024-09-23 NOTE — PROGRESS NOTES
Subjective:      Patient ID: Claire Ang is a 74 y.o. White female      Chief Complaint: Fatigue       Past Medical History:   Diagnosis Date    Anxiety     CAD (coronary artery disease)     Callus of foot 07/31/2024    Chronic cystitis 10/26/2022    Compression fracture of T12 vertebra 04/11/2024    Coronary artery disease involving native coronary artery of native heart with unstable angina pectoris 10/11/2023    Depressive disorder 05/26/2022    Formatting of this note might be different from the original.   Last Assessment & Plan:     Formatting of this note might be different from the original.    Stable on current prescription meds. Keep appointments with psych      Diabetes mellitus     Fibromyalgia     Gastric ulcer     Gastroesophageal reflux disease 02/08/2018    Formatting of this note might be different from the original.   Last Assessment & Plan:     Formatting of this note might be different from the original.    On PPI and H2 blocker.  Needs to establish with new GI since dr. tavera retired.      Genital herpes simplex 05/26/2022    Gout 02/07/2023    History of breast cancer 05/26/2022    History of skin cancer 08/26/2022    Hypercalcemia 03/27/2023    Hyperlipidemia associated with type 2 diabetes mellitus 08/26/2022    Hypothyroidism     Malignant neoplasm of central portion of right breast in female, estrogen receptor positive 08/05/2022    Nephrolithiasis     NSTEMI (non-ST elevated myocardial infarction)     Onychomycosis     Osteoporosis     Other emphysema 03/27/2023    Paroxysmal atrial fibrillation     Postmenopausal 08/26/2022    Stage 3b chronic kidney disease         HPI  Fatigue  This is a new problem. Episode onset: in the past two weeks. Associated symptoms include fatigue. Pertinent negatives include no abdominal pain, chest pain, chills, coughing, fever, myalgias, nausea, sore throat or urinary symptoms.   She also has rhinorrhea.  States negative home Covid-19 testing x  2.    Of note, history of hypothyroidism; on Synthroid 75 mcg daily.  Denies any other problems.      Patient Care Team:  Jany Magana MD as PCP - General (Family Medicine)  Rakesh Henriquez MD as Consulting Physician (Gastroenterology)  Mino Vick Jr., MD as Consulting Physician (Ophthalmology)  Marlon Reich II, MD as Consulting Physician (Ophthalmology)  Corey Daigle MD as Consulting Physician (Cardiology)  Jasmyne Tsang MD as Consulting Physician (Oncology)  Gordon Trujillo MD (Dermatology)  Stefanie Zapien MD as Consulting Physician (Ophthalmology)  Jagdeep Gamboa MD as Consulting Physician (Urology)  Chino Yanes MD as Consulting Physician (Pulmonary Disease)  Racheal Robison MD as Consulting Physician (Nephrology)      Review of Systems   Constitutional:  Positive for fatigue. Negative for chills and fever.   HENT:  Positive for rhinorrhea. Negative for sore throat.    Respiratory:  Negative for cough.    Cardiovascular:  Negative for chest pain.   Gastrointestinal:  Negative for abdominal pain and nausea.   Musculoskeletal:  Negative for myalgias.           Objective:      Vitals:    09/23/24 1507   BP: 125/77   Pulse: 95   Resp: 18   Temp: 97.3 °F (36.3 °C)      Body mass index is 25.51 kg/m².     Physical Exam  Vitals reviewed.   Constitutional:       Appearance: Normal appearance.   HENT:      Head: Normocephalic.      Nose: Nose normal. No mucosal edema.      Right Turbinates: Not enlarged or swollen.      Left Turbinates: Not enlarged or swollen.      Mouth/Throat:      Mouth: Mucous membranes are moist.   Eyes:      Conjunctiva/sclera: Conjunctivae normal.      Pupils: Pupils are equal, round, and reactive to light.   Cardiovascular:      Rate and Rhythm: Normal rate and regular rhythm.   Pulmonary:      Effort: Pulmonary effort is normal. No respiratory distress.      Breath sounds: Normal breath sounds. No decreased breath sounds, wheezing, rhonchi or  rales.   Abdominal:      General: Bowel sounds are normal.      Palpations: Abdomen is soft.   Musculoskeletal:         General: Normal range of motion.      Cervical back: Normal range of motion and neck supple.   Skin:     General: Skin is warm and dry.   Neurological:      Mental Status: She is alert and oriented to person, place, and time.   Psychiatric:         Mood and Affect: Mood normal.            Current Outpatient Medications:     atorvastatin (LIPITOR) 40 MG tablet, Take 40 mg by mouth every evening., Disp: , Rfl:     cetirizine (ZYRTEC) 5 MG tablet, Take 5 mg by mouth daily as needed., Disp: , Rfl:     cyanocobalamin, vitamin B-12, 1,000 mcg Subl, Take by mouth once daily., Disp: , Rfl:     ezetimibe (ZETIA) 10 mg tablet, Take 1 tablet (10 mg total) by mouth every evening., Disp: 90 tablet, Rfl: 3    flash glucose scanning reader Misc, 1 each by Misc.(Non-Drug; Combo Route) route Daily., Disp: 200 each, Rfl: 11    FREESTYLE PAULO 14 DAY SENSOR Kit, USE TO MONITOR BLOOD SUGAR (DX E11.9), Disp: 2 kit, Rfl: 11    mupirocin (BACTROBAN) 2 % ointment, Apply topically 3 (three) times daily., Disp: 22 g, Rfl: 5    pantoprazole (PROTONIX) 40 MG tablet, Take 40 mg by mouth every morning. (Patient not taking: Reported on 8/27/2024), Disp: , Rfl:     semaglutide (OZEMPIC) 0.25 mg or 0.5 mg (2 mg/3 mL) pen injector, Inject 0.5 mg into the skin every 7 days., Disp: 9 mL, Rfl: 0    SYNTHROID 75 mcg tablet, TAKE 1 TABLET ONCE DAILY, Disp: 90 tablet, Rfl: 0    valACYclovir (VALTREX) 1000 MG tablet, Take by mouth as needed., Disp: , Rfl:     WELLBUTRIN  mg TBSR 12 hr tablet, Take 1 tablet (100 mg total) by mouth 2 (two) times daily., Disp: 180 tablet, Rfl: 2    Assessment & Plan:     Problem List Items Addressed This Visit          Endocrine    Hypothyroidism     C/O of fatigue  Repeat labs today  Will call with results  Keep appt with PCP for follow up         Relevant Orders    Comprehensive Metabolic Panel     TSH    T4, Free    CBC Auto Differential       Other    Fatigue - Primary     Generalized fatigue  Repeat labs today (including TSH, Free T4/T3)  Will call with results  Keep appt with PCP for follow up  Instructed to continue to monitor symptoms  Report to ED for any CP, SOB, and/or worsening symptoms  Pt is agreeable to plan and verbalizes understanding           Relevant Orders    Comprehensive Metabolic Panel    TSH    T4, Free    CBC Auto Differential

## 2024-09-25 ENCOUNTER — APPOINTMENT (OUTPATIENT)
Dept: LAB | Facility: HOSPITAL | Age: 74
End: 2024-09-25
Attending: UROLOGY
Payer: MEDICARE

## 2024-09-25 DIAGNOSIS — N39.0 URINARY TRACT INFECTION, SITE NOT SPECIFIED: Primary | ICD-10-CM

## 2024-09-25 LAB
BACTERIA #/AREA URNS AUTO: ABNORMAL /HPF
BILIRUB UR QL STRIP.AUTO: NEGATIVE
CLARITY UR: CLEAR
COLOR UR AUTO: ABNORMAL
GLUCOSE UR QL STRIP: NEGATIVE
HGB UR QL STRIP: ABNORMAL
KETONES UR QL STRIP: NEGATIVE
LEUKOCYTE ESTERASE UR QL STRIP: ABNORMAL
NITRITE UR QL STRIP: NEGATIVE
PH UR STRIP: 6 [PH]
PROT UR QL STRIP: NEGATIVE
RBC #/AREA URNS AUTO: ABNORMAL /HPF
SP GR UR STRIP.AUTO: 1.02 (ref 1–1.03)
SQUAMOUS #/AREA URNS AUTO: ABNORMAL /HPF
UROBILINOGEN UR STRIP-ACNC: 0.2
WBC #/AREA URNS AUTO: ABNORMAL /HPF

## 2024-09-25 PROCEDURE — 81001 URINALYSIS AUTO W/SCOPE: CPT

## 2024-09-25 PROCEDURE — 81003 URINALYSIS AUTO W/O SCOPE: CPT

## 2024-09-25 PROCEDURE — 87186 SC STD MICRODIL/AGAR DIL: CPT

## 2024-09-25 PROCEDURE — 87086 URINE CULTURE/COLONY COUNT: CPT

## 2024-09-25 RX ORDER — LEVOTHYROXINE SODIUM 50 UG/1
50 TABLET ORAL
Qty: 30 TABLET | Refills: 11 | Status: SHIPPED | OUTPATIENT
Start: 2024-09-25 | End: 2025-09-25

## 2024-09-25 RX ORDER — LEVOTHYROXINE SODIUM 25 UG/1
12.5 TABLET ORAL
Qty: 15 TABLET | Refills: 11 | Status: SHIPPED | OUTPATIENT
Start: 2024-09-25 | End: 2025-09-25

## 2024-09-26 DIAGNOSIS — E03.9 HYPOTHYROIDISM, UNSPECIFIED TYPE: Primary | ICD-10-CM

## 2024-09-27 LAB — BACTERIA UR CULT: ABNORMAL

## 2024-10-06 DIAGNOSIS — E11.22 TYPE 2 DIABETES MELLITUS WITH STAGE 3B CHRONIC KIDNEY DISEASE, WITHOUT LONG-TERM CURRENT USE OF INSULIN: ICD-10-CM

## 2024-10-06 DIAGNOSIS — N18.32 TYPE 2 DIABETES MELLITUS WITH STAGE 3B CHRONIC KIDNEY DISEASE, WITHOUT LONG-TERM CURRENT USE OF INSULIN: ICD-10-CM

## 2024-10-07 RX ORDER — SEMAGLUTIDE 0.68 MG/ML
INJECTION, SOLUTION SUBCUTANEOUS
Qty: 9 ML | Refills: 0 | Status: SHIPPED | OUTPATIENT
Start: 2024-10-07 | End: 2024-10-10

## 2024-10-10 ENCOUNTER — OFFICE VISIT (OUTPATIENT)
Dept: FAMILY MEDICINE | Facility: CLINIC | Age: 74
End: 2024-10-10
Payer: MEDICARE

## 2024-10-10 ENCOUNTER — LAB VISIT (OUTPATIENT)
Dept: LAB | Facility: HOSPITAL | Age: 74
End: 2024-10-10
Attending: INTERNAL MEDICINE
Payer: MEDICARE

## 2024-10-10 VITALS
OXYGEN SATURATION: 99 % | SYSTOLIC BLOOD PRESSURE: 122 MMHG | HEIGHT: 64 IN | BODY MASS INDEX: 26.15 KG/M2 | HEART RATE: 88 BPM | TEMPERATURE: 98 F | RESPIRATION RATE: 16 BRPM | DIASTOLIC BLOOD PRESSURE: 64 MMHG | WEIGHT: 153.19 LBS

## 2024-10-10 DIAGNOSIS — E11.69 HYPERLIPIDEMIA ASSOCIATED WITH TYPE 2 DIABETES MELLITUS: ICD-10-CM

## 2024-10-10 DIAGNOSIS — E03.9 ACQUIRED HYPOTHYROIDISM: ICD-10-CM

## 2024-10-10 DIAGNOSIS — N18.32 TYPE 2 DIABETES MELLITUS WITH STAGE 3B CHRONIC KIDNEY DISEASE, WITHOUT LONG-TERM CURRENT USE OF INSULIN: Primary | ICD-10-CM

## 2024-10-10 DIAGNOSIS — Z95.1 POSTSURGICAL AORTOCORONARY BYPASS STATUS: ICD-10-CM

## 2024-10-10 DIAGNOSIS — E11.22 TYPE 2 DIABETES MELLITUS WITH STAGE 3B CHRONIC KIDNEY DISEASE, WITHOUT LONG-TERM CURRENT USE OF INSULIN: Primary | ICD-10-CM

## 2024-10-10 DIAGNOSIS — M54.2 NECK PAIN: ICD-10-CM

## 2024-10-10 DIAGNOSIS — E03.9 HYPOTHYROIDISM, UNSPECIFIED TYPE: ICD-10-CM

## 2024-10-10 DIAGNOSIS — E78.5 HYPERLIPIDEMIA, UNSPECIFIED HYPERLIPIDEMIA TYPE: Primary | ICD-10-CM

## 2024-10-10 DIAGNOSIS — I15.2 HYPERTENSION ASSOCIATED WITH DIABETES: ICD-10-CM

## 2024-10-10 DIAGNOSIS — I25.110 ATHEROSCLEROTIC HEART DISEASE OF NATIVE CORONARY ARTERY WITH UNSTABLE ANGINA PECTORIS: ICD-10-CM

## 2024-10-10 DIAGNOSIS — E78.5 HYPERLIPIDEMIA ASSOCIATED WITH TYPE 2 DIABETES MELLITUS: ICD-10-CM

## 2024-10-10 DIAGNOSIS — I10 ESSENTIAL HYPERTENSION, MALIGNANT: ICD-10-CM

## 2024-10-10 DIAGNOSIS — E11.59 HYPERTENSION ASSOCIATED WITH DIABETES: ICD-10-CM

## 2024-10-10 LAB
ALBUMIN SERPL-MCNC: 3.5 G/DL (ref 3.4–4.8)
ALBUMIN/GLOB SERPL: 1.1 RATIO (ref 1.1–2)
ALP SERPL-CCNC: 94 UNIT/L (ref 40–150)
ALT SERPL-CCNC: 15 UNIT/L (ref 0–55)
ANION GAP SERPL CALC-SCNC: 9 MEQ/L
AST SERPL-CCNC: 17 UNIT/L (ref 5–34)
BILIRUB SERPL-MCNC: 0.9 MG/DL
BUN SERPL-MCNC: 20.6 MG/DL (ref 9.8–20.1)
CALCIUM SERPL-MCNC: 9.1 MG/DL (ref 8.4–10.2)
CHLORIDE SERPL-SCNC: 104 MMOL/L (ref 98–107)
CHOLEST SERPL-MCNC: 140 MG/DL
CHOLEST/HDLC SERPL: 2 {RATIO} (ref 0–5)
CO2 SERPL-SCNC: 28 MMOL/L (ref 23–31)
CREAT SERPL-MCNC: 1.12 MG/DL (ref 0.55–1.02)
CREAT/UREA NIT SERPL: 18
GFR SERPLBLD CREATININE-BSD FMLA CKD-EPI: 52 ML/MIN/1.73/M2
GLOBULIN SER-MCNC: 3.2 GM/DL (ref 2.4–3.5)
GLUCOSE SERPL-MCNC: 135 MG/DL (ref 82–115)
HDLC SERPL-MCNC: 63 MG/DL (ref 35–60)
LDLC SERPL CALC-MCNC: 53 MG/DL (ref 50–140)
POTASSIUM SERPL-SCNC: 3.9 MMOL/L (ref 3.5–5.1)
PROT SERPL-MCNC: 6.7 GM/DL (ref 5.8–7.6)
SODIUM SERPL-SCNC: 141 MMOL/L (ref 136–145)
T4 FREE SERPL-MCNC: 1.02 NG/DL (ref 0.7–1.48)
TRIGL SERPL-MCNC: 121 MG/DL (ref 37–140)
TSH SERPL-ACNC: 1.54 UIU/ML (ref 0.35–4.94)
VLDLC SERPL CALC-MCNC: 24 MG/DL

## 2024-10-10 PROCEDURE — 84439 ASSAY OF FREE THYROXINE: CPT

## 2024-10-10 PROCEDURE — 84443 ASSAY THYROID STIM HORMONE: CPT

## 2024-10-10 PROCEDURE — 1101F PT FALLS ASSESS-DOCD LE1/YR: CPT | Mod: CPTII,,, | Performed by: FAMILY MEDICINE

## 2024-10-10 PROCEDURE — 99214 OFFICE O/P EST MOD 30 MIN: CPT | Mod: ,,, | Performed by: FAMILY MEDICINE

## 2024-10-10 PROCEDURE — 80061 LIPID PANEL: CPT

## 2024-10-10 PROCEDURE — 3074F SYST BP LT 130 MM HG: CPT | Mod: CPTII,,, | Performed by: FAMILY MEDICINE

## 2024-10-10 PROCEDURE — 80053 COMPREHEN METABOLIC PANEL: CPT

## 2024-10-10 PROCEDURE — 3061F NEG MICROALBUMINURIA REV: CPT | Mod: CPTII,,, | Performed by: FAMILY MEDICINE

## 2024-10-10 PROCEDURE — 3288F FALL RISK ASSESSMENT DOCD: CPT | Mod: CPTII,,, | Performed by: FAMILY MEDICINE

## 2024-10-10 PROCEDURE — 1159F MED LIST DOCD IN RCRD: CPT | Mod: CPTII,,, | Performed by: FAMILY MEDICINE

## 2024-10-10 PROCEDURE — 3078F DIAST BP <80 MM HG: CPT | Mod: CPTII,,, | Performed by: FAMILY MEDICINE

## 2024-10-10 PROCEDURE — 3066F NEPHROPATHY DOC TX: CPT | Mod: CPTII,,, | Performed by: FAMILY MEDICINE

## 2024-10-10 PROCEDURE — 3044F HG A1C LEVEL LT 7.0%: CPT | Mod: CPTII,,, | Performed by: FAMILY MEDICINE

## 2024-10-10 PROCEDURE — 36415 COLL VENOUS BLD VENIPUNCTURE: CPT

## 2024-10-10 PROCEDURE — 3008F BODY MASS INDEX DOCD: CPT | Mod: CPTII,,, | Performed by: FAMILY MEDICINE

## 2024-10-10 RX ORDER — SEMAGLUTIDE 1.34 MG/ML
1 INJECTION, SOLUTION SUBCUTANEOUS
Qty: 9 ML | Refills: 3 | Status: SHIPPED | OUTPATIENT
Start: 2024-10-10 | End: 2025-10-10

## 2024-10-10 RX ORDER — METHOCARBAMOL 750 MG/1
750 TABLET, FILM COATED ORAL 3 TIMES DAILY
COMMUNITY
Start: 2024-10-07

## 2024-10-10 RX ORDER — LEVOTHYROXINE SODIUM 50 UG/1
50 TABLET ORAL
Qty: 90 TABLET | Refills: 3 | Status: SHIPPED | OUTPATIENT
Start: 2024-10-10 | End: 2024-10-15 | Stop reason: SDUPTHER

## 2024-10-10 RX ORDER — OXYCODONE AND ACETAMINOPHEN 5; 325 MG/1; MG/1
1 TABLET ORAL EVERY 6 HOURS PRN
COMMUNITY
Start: 2024-10-07

## 2024-10-10 RX ORDER — LEVOTHYROXINE SODIUM 25 UG/1
12.5 TABLET ORAL
Qty: 45 TABLET | Refills: 3 | Status: SHIPPED | OUTPATIENT
Start: 2024-10-10 | End: 2024-10-15 | Stop reason: SDUPTHER

## 2024-10-10 NOTE — ASSESSMENT & PLAN NOTE
Lab Results   Component Value Date    HGBA1C 6.7 07/29/2024     Foot exam 7/2024  Eye exam with dr. Zapien.  Will request  Urine micro 7/2024    Encouraged compliance with dmii diet.     Was having hypoglycemic episodes on glimepiride.     on ozempic  1mg. refill sent to [facility]. On statin      Contact clinic for concerns.

## 2024-10-10 NOTE — ASSESSMENT & PLAN NOTE
"On zetia and statin. Keep appts with cards      Lab Results   Component Value Date    CHOL 140 10/10/2024    CHOL 124 07/29/2024    CHOL 115 06/06/2024     Lab Results   Component Value Date    HDL 63 (H) 10/10/2024    HDL 61 (H) 07/29/2024    HDL 53 06/06/2024     Lab Results   Component Value Date    LDLCALC 60 10/12/2023     Lab Results   Component Value Date    TRIG 121 10/10/2024    TRIG 58 07/29/2024    TRIG 44 06/06/2024       No results found for: "CHOLHDL"    "

## 2024-10-10 NOTE — PROGRESS NOTES
"Subjective:        Patient ID: Claire Ang is a 74 y.o. female.    Chief Complaint: Diabetes      Patient presents to the clinic unaccompanied for follow up.  She is due for her wellness visit in july.      Went to [place] to visit friends.  Had severe neck pain.  Went to er.  Did not stay.  Went home.  Saw [facility] [place] on [place]7/24. Gave 4 steroid shots and rx for percocet and gabapentin. Is better     she has diabetes.  She reports compliance with her medications.  Her last A1c was 6.7 2024. Her last foot exam was 2024.  Her ophthalmologist is Dr. denise who told her that she had the beginnings of macular degeneration and was referred to Dr. Vick.   She was previously on metformin but this was stopped after her labs of 2021 showed elevated creatinine.  This was replaced with glipizide but made her nauseous so she stopped. she is currently on ozempic 1mg weekly.  Needs refill.      She has hypertension and hyperlipidemia.  Her cardiologist is Dr. Daigle.  On zetia and statin.  Had open heart surgery in 10/2023.      She has hypothyroidism and is on Synthroid name brand.  Her calcium has been elevated in the past. Not taking calcium supplement. Sometimes takes tums. Pth, vitamin d and ionized calcium 3/2023 were wnl                Went to [facility] ER 3/2/23 for vomiting. Got IV fluids.   Cmp there calcium was 10.2.   had ct abd done as well which also reported emphysematous changed at lung bases.  Denies sob. Never smoker.  Brother- lung cancer (dx 69). Another brother-  from lung disease (not cancer) at age 78. Dad had asbestosis. Mom dx age 65 from "oat" cell carcinoma.  Mom was smoker. No other relatives smoked. Cxr 3/2023 showed hyperinflation.  We will order PFTs.   pulmonology- dr. Yanes in [place].         She presented to the wellness clinic on May 18, 2022.  Due to her weakness and hypotension and abdominal pain she was referred to the emergency room.  Labs and CT scan were done showing cystitis/ " UTI.  The patient was given Cipro, Bentyl, Zofran and tramadol and discharged home.  Reports frequent utis.  Used to see dr. Christopher.  Would like to see another urologist. Now seeing- Urology dr. Alfonso in .      She has acid reflux and reports compliance with her PPI and H2 blocker.  Previously she saw Dr. Tavera and is requesting to establish with a new Gastro, Dr. Peng.  Her last colonoscopy was with Dr. tavera 1/16/15  Which showed external hemorrhoids and sigmoid diverticulosis      She has anxiety and depression and follows with Psychiatry, Dr. Aguirre.  She is on Wellbutrin.       She has ckd.  Seeing dr. Robison.  Has follow up  12/2023     she has a history of breast cancer and had a right mastectomy.  She is followed by Dr. Tsang.  Appt 8/20/23 with labs.   She was on tamoxifen.  She wears a prosthetic. mammogram 7/2022 was normal. breast mri 5/2023 wnl. She has had a complete hysterectomy at age 37 her heavy menses and therefore no longer does Pap smears     She has osteoporosis and was on Prolia.  She takes vitamin-D.  does not take calcium due to elevated calcium.  Her last bone density test was 8/2021     she has a personal history of skin cancer.  Her dermatologist is Dr. Trujillo.      She is allergic to sulfa, hydrocodone, meperidine and morphine. She declines immunizations today     She had left cataract surgery with dr. Zapien 2/2023     Got covid 11/2022 while on cruise. Took ivermectin. Is better.              Review of Systems   Constitutional: Negative.    HENT: Negative.     Respiratory: Negative.     Cardiovascular: Negative.    Gastrointestinal: Negative.    Genitourinary: Negative.    Musculoskeletal:  Positive for back pain.         Review of patient's allergies indicates:   Allergen Reactions    Hydrocodone bitartrate      Other reaction(s): GI Intolerance  Other reaction(s): GI Intolerance  Acetaminophen hydrocodone bitartrate  Acetaminophen hydrocodone bitartrate       "Sulfamethoxazole-trimethoprim      Other reaction(s): stomach pain  Other reaction(s): stomach pain      Hydrocodone-acetaminophen Nausea And Vomiting     Other reaction(s): Visual hallucinations  Other reaction(s): Visual hallucinations      Meperidine Nausea And Vomiting     Other reaction(s): GI Intolerance, Visual hallucinations  Other reaction(s): GI Intolerance, Visual hallucinations    Other reaction(s): Vomitting    Morphine Palpitations     Other reaction(s): chest tightness  Other reaction(s): chest tightness        Vitals:    10/10/24 1358   BP: 122/64   Pulse: 88   Resp: 16   Temp: 98 °F (36.7 °C)   TempSrc: Temporal   SpO2: 99%   Weight: 69.5 kg (153 lb 3.2 oz)   Height: 5' 4" (1.626 m)      Social History     Socioeconomic History    Marital status:    Tobacco Use    Smoking status: Never     Passive exposure: Never    Smokeless tobacco: Never   Substance and Sexual Activity    Alcohol use: Not Currently    Drug use: Never   Social History Narrative    ** Merged History Encounter **          Social Drivers of Health     Financial Resource Strain: Patient Declined (10/8/2023)    [facility], [facility]    Overall Financial Resource Strain (CARDIA)     Difficulty of Paying Living Expenses: Patient declined   Food Insecurity: Patient Declined (10/8/2023)    [facility], [facility] and Its Subsidiaries and Affiliates    Hunger Vital Sign     Worried About Running Out of Food in the Last Year: Patient declined     Ran Out of Food in the Last Year: Patient declined   Transportation Needs: Patient Declined (10/8/2023)    [facility], [facility]    PRAPARE - Transportation     Lack of Transportation (Medical): Patient declined     Lack of Transportation (Non-Medical): Patient declined   Stress: Patient Declined (10/8/2023)    Received from [facility], [facility]    [facility] of Occupational Health - Occupational Stress Questionnaire     Feeling of Stress : Patient declined      Family History   Problem Relation Name Age of Onset    Cancer Mother      Cancer Brother      Lung disease Brother            Objective:     Physical Exam  Vitals and nursing note reviewed.   Constitutional:       Appearance: Normal appearance. She is normal weight.   HENT:      Head: Normocephalic and atraumatic.      Nose: Nose normal.      Mouth/Throat:      Mouth: Mucous membranes are moist.      Pharynx: Oropharynx is clear.   Eyes:      Extraocular Movements: Extraocular movements intact.   Cardiovascular:      Rate and Rhythm: Normal rate and regular rhythm.      Pulses: Normal pulses.      Heart sounds: Normal heart sounds.   Pulmonary:      Effort: Pulmonary effort is normal.      Breath sounds: Normal breath sounds.   Musculoskeletal:         General: Normal range of motion.      Cervical back: Normal range of motion.   Skin:     General: Skin is warm and dry.   Neurological:      General: No focal deficit present.      Mental Status: She is alert and oriented to person, place, and time. Mental status is at baseline.   Psychiatric:         Mood and Affect: Mood normal.       Current Outpatient Medications on File Prior to Visit   Medication Sig Dispense Refill    atorvastatin (LIPITOR) 40 MG tablet Take 40 mg by mouth every evening.      cetirizine (ZYRTEC) 5 MG tablet Take 5 mg by mouth daily as needed.       cyanocobalamin, vitamin B-12, 1,000 mcg Subl Take by mouth once daily.      ezetimibe (ZETIA) 10 mg tablet Take 1 tablet (10 mg total) by mouth every evening. 90 tablet 3    methocarbamoL (ROBAXIN) 750 MG Tab Take 750 mg by mouth 3 (three) times daily.      mupirocin (BACTROBAN) 2 % ointment Apply topically 3 (three) times daily. 22 g 5    oxyCODONE-acetaminophen (PERCOCET) 5-325 mg per tablet Take 1 tablet by mouth every 6 (six) hours as needed.      pantoprazole (PROTONIX) 40 MG tablet Take 40 mg by mouth every morning.      valACYclovir (VALTREX) 1000 MG tablet Take by mouth as needed.      WELLBUTRIN  mg TBSR 12 hr tablet Take 1 tablet (100 mg total) by mouth 2 (two) times daily. 180 tablet 2    flash glucose scanning reader Misc 1 each by Misc.(Non-Drug; Combo Route) route Daily. 200 each 11    ShoutOut PAULO 14 DAY SENSOR Kit USE TO MONITOR BLOOD SUGAR (DX E11.9) 2 kit 11     No current facility-administered medications on file prior to visit.     Health Maintenance   Topic Date Due    TETANUS VACCINE  09/28/2017    Shingles Vaccine (2 of 2) 02/02/2021    Eye Exam  09/07/2023    Hemoglobin A1c  01/29/2025    Foot Exam  07/31/2025    DEXA Scan  08/21/2025    Mammogram  08/22/2025    Lipid Panel  10/10/2025    Colorectal Cancer Screening  08/05/2029    Hepatitis C Screening  Completed      Results for orders placed or performed in visit on 10/10/24   TSH    Collection Time: 10/10/24  8:32 AM   Result Value Ref Range    TSH 1.541 0.350 - 4.940 uIU/mL   T4, Free    Collection Time: 10/10/24  8:32 AM   Result Value Ref Range    Thyroxine Free 1.02 0.70 - 1.48 ng/dL   Comprehensive Metabolic Panel    Collection Time: 10/10/24  8:32 AM   Result Value Ref Range    Sodium 141 136 - 145 mmol/L    Potassium 3.9 3.5 - 5.1 mmol/L    Chloride 104 98 - 107 mmol/L    CO2 28 23 - 31 mmol/L    Glucose 135 (H) 82 - 115 mg/dL    Blood Urea Nitrogen 20.6 (H) 9.8 - 20.1 mg/dL    Creatinine 1.12 (H) 0.55 - 1.02 mg/dL     Calcium 9.1 8.4 - 10.2 mg/dL    Protein Total 6.7 5.8 - 7.6 gm/dL    Albumin 3.5 3.4 - 4.8 g/dL    Globulin 3.2 2.4 - 3.5 gm/dL    Albumin/Globulin Ratio 1.1 1.1 - 2.0 ratio    Bilirubin Total 0.9 <=1.5 mg/dL    ALP 94 40 - 150 unit/L    ALT 15 0 - 55 unit/L    AST 17 5 - 34 unit/L    eGFR 52 mL/min/1.73/m2    Anion Gap 9.0 mEq/L    BUN/Creatinine Ratio 18    Lipid Panel    Collection Time: 10/10/24  8:32 AM   Result Value Ref Range    Cholesterol Total 140 <=200 mg/dL    HDL Cholesterol 63 (H) 35 - 60 mg/dL    Triglyceride 121 37 - 140 mg/dL    Cholesterol/HDL Ratio 2 0 - 5    Very Low Density Lipoprotein 24     LDL Cholesterol 53.00 50.00 - 140.00 mg/dL          Assessment & Plan:     Active Problem List with Overview Notes    Diagnosis Date Noted    Neck pain 10/15/2024    Fatigue 09/23/2024    Wellness examination 07/31/2024    Urinary tract infection without hematuria 07/31/2024    Callus of foot 07/31/2024    Compression fracture of T12 vertebra 04/11/2024    Coronary artery disease involving native coronary artery of native heart with unstable angina pectoris 10/11/2023    Breast cancer screening by mammogram 08/15/2023    Hypercalcemia 03/27/2023    Other emphysema 03/27/2023    Advanced care planning/counseling discussion 02/07/2023    Gout 02/07/2023    Stage 3b chronic kidney disease     Chronic cystitis 10/26/2022    Hyperlipidemia associated with type 2 diabetes mellitus 08/26/2022    Hypertension associated with diabetes 08/26/2022    Postmenopausal 08/26/2022    History of skin cancer 08/26/2022    Malignant neoplasm of central portion of right breast in female, estrogen receptor positive 08/05/2022    Acute pain of left shoulder 07/13/2022    Diabetes mellitus 05/26/2022    Hypothyroidism 05/26/2022    Anxiety 05/26/2022     Formatting of this note might be different from the original.   Last Assessment & Plan:     Formatting of this note might be different from the original.    Stable on current  prescription meds. Keep appointments with psych      Depressive disorder 05/26/2022     Formatting of this note might be different from the original.   Last Assessment & Plan:     Formatting of this note might be different from the original.    Stable on current prescription meds. Keep appointments with psych      History of breast cancer 05/26/2022    Genital herpes simplex 05/26/2022    Malignant neoplasm of skin of upper extremity 05/26/2022    Age-related osteoporosis without current pathological fracture 04/05/2022    Gastroesophageal reflux disease 02/08/2018     Formatting of this note might be different from the original.   Last Assessment & Plan:     Formatting of this note might be different from the original.    On PPI and H2 blocker.  Needs to establish with new GI since dr. tavera retired.      Osteoporosis 01/11/2017       1. Type 2 diabetes mellitus with stage 3b chronic kidney disease, without long-term current use of insulin  Assessment & Plan:  Lab Results   Component Value Date    HGBA1C 6.7 07/29/2024     Foot exam 7/2024  Eye exam with dr. Zapien.  Will request  Urine micro 7/2024    Encouraged compliance with dmii diet.     Was having hypoglycemic episodes on glimepiride.     on ozempic  1mg. refill sent to [facility]. On statin      Contact clinic for concerns.       Orders:  -     semaglutide (OZEMPIC) 1 mg/dose (4 mg/3 mL); Inject 1 mg into the skin every 7 days.  Dispense: 9 mL; Refill: 3  -     Comprehensive Metabolic Panel; Future; Expected date: 01/10/2025  -     TSH; Future; Expected date: 01/10/2025  -     Hemoglobin A1C; Future; Expected date: 01/10/2025    2. Hypertension associated with diabetes  Assessment & Plan:  BP at goal  Continue current prescription meds. Keep appts with cards    Orders:  -     Comprehensive Metabolic Panel; Future; Expected date: 01/10/2025  -     TSH; Future; Expected date: 01/10/2025  -     Hemoglobin A1C; Future; Expected date: 01/10/2025    3.  "Hyperlipidemia associated with type 2 diabetes mellitus  Assessment & Plan:  On zetia and statin. Keep appts with cards      Lab Results   Component Value Date    CHOL 140 10/10/2024    CHOL 124 07/29/2024    CHOL 115 06/06/2024     Lab Results   Component Value Date    HDL 63 (H) 10/10/2024    HDL 61 (H) 07/29/2024    HDL 53 06/06/2024     Lab Results   Component Value Date    LDLCALC 60 10/12/2023     Lab Results   Component Value Date    TRIG 121 10/10/2024    TRIG 58 07/29/2024    TRIG 44 06/06/2024       No results found for: "CHOLHDL"      Orders:  -     Comprehensive Metabolic Panel; Future; Expected date: 01/10/2025  -     TSH; Future; Expected date: 01/10/2025  -     Hemoglobin A1C; Future; Expected date: 01/10/2025    4. Acquired hypothyroidism  Assessment & Plan:  Lab Results   Component Value Date    TSH 1.541 10/10/2024     Stable on synthroid dose    Orders:  -     Discontinue: levothyroxine (SYNTHROID) 50 MCG tablet; Take 1 tablet (50 mcg total) by mouth before breakfast. Take with Synthroid 12.5  Dispense: 90 tablet; Refill: 3  -     Discontinue: levothyroxine (SYNTHROID) 25 MCG tablet; Take 0.5 tablets (12.5 mcg total) by mouth before breakfast. Take with Synthroid 50 mcg tablet  Dispense: 45 tablet; Refill: 3  -     Comprehensive Metabolic Panel; Future; Expected date: 01/10/2025  -     TSH; Future; Expected date: 01/10/2025  -     Hemoglobin A1C; Future; Expected date: 01/10/2025    5. Neck pain  Assessment & Plan:  Went to ER while in [place] with friends.  [place] [facility] [place]  Got steroid shots. Is on percocet. Sees dr. Navas or dr. Hernandez locally who prescribes this.             Follow up in about 4 months (around 2/10/2025) for diabetes with labs.   "

## 2024-10-15 DIAGNOSIS — E03.9 ACQUIRED HYPOTHYROIDISM: ICD-10-CM

## 2024-10-15 PROBLEM — T46.6X5A MYALGIA DUE TO STATIN: Status: RESOLVED | Noted: 2023-02-07 | Resolved: 2024-10-15

## 2024-10-15 PROBLEM — M79.10 MYALGIA DUE TO STATIN: Status: RESOLVED | Noted: 2023-02-07 | Resolved: 2024-10-15

## 2024-10-15 PROBLEM — M54.2 NECK PAIN: Status: ACTIVE | Noted: 2024-10-15

## 2024-10-15 RX ORDER — LEVOTHYROXINE SODIUM 50 UG/1
50 TABLET ORAL
Qty: 90 TABLET | Refills: 3 | Status: SHIPPED | OUTPATIENT
Start: 2024-10-15 | End: 2025-10-15

## 2024-10-15 RX ORDER — LEVOTHYROXINE SODIUM 25 UG/1
12.5 TABLET ORAL
Qty: 45 TABLET | Refills: 3 | Status: SHIPPED | OUTPATIENT
Start: 2024-10-15 | End: 2025-10-15

## 2024-10-15 NOTE — ASSESSMENT & PLAN NOTE
Went to ER while in [place] with friends.  Saw [facility] [place]  Got steroid shots. Is on percocet. Sees dr. Navas or dr. Hernandez locally who prescribes this.

## 2024-10-31 LAB
LEFT EYE DM RETINOPATHY: NEGATIVE
RIGHT EYE DM RETINOPATHY: NEGATIVE

## 2024-11-11 ENCOUNTER — TELEPHONE (OUTPATIENT)
Dept: FAMILY MEDICINE | Facility: CLINIC | Age: 74
End: 2024-11-11
Payer: MEDICARE

## 2024-11-11 NOTE — TELEPHONE ENCOUNTER
----- Message from Skyler sent at 11/11/2024  9:03 AM CST -----  .Who Called: Claire Ang    Caller is requesting information on test results.    Name of Test (Lab/Mammo/Etc): xray of the neck or mri of the neck   Date of Test: 8/11/24  Where the test was performed: [facility]   Ordering Provider: Izzy       Patient's Preferred Phone Number on File: [phone]   Best Call Back Number, if different:  Additional Information: pt states that she needs the results of the items listed for her doctor in [place].   Dr. Jones in [place] phone number [phone]

## 2024-12-05 ENCOUNTER — HOSPITAL ENCOUNTER (OUTPATIENT)
Dept: RADIOLOGY | Facility: HOSPITAL | Age: 74
Discharge: HOME | End: 2024-12-05
Attending: NURSE PRACTITIONER
Payer: MEDICARE

## 2024-12-05 DIAGNOSIS — R50.9 FEVER, UNSPECIFIED: ICD-10-CM

## 2024-12-05 DIAGNOSIS — R50.9 FEVER, UNSPECIFIED: Primary | ICD-10-CM

## 2024-12-05 PROCEDURE — 71046 X-RAY EXAM CHEST 2 VIEWS: CPT | Mod: TC

## 2024-12-13 ENCOUNTER — DOCUMENTATION ONLY (OUTPATIENT)
Dept: FAMILY MEDICINE | Facility: CLINIC | Age: 74
End: 2024-12-13
Payer: MEDICARE

## 2024-12-23 ENCOUNTER — HOSPITAL ENCOUNTER (OUTPATIENT)
Dept: RADIOLOGY | Facility: HOSPITAL | Age: 74
Discharge: HOME | End: 2024-12-23
Attending: NURSE PRACTITIONER
Payer: MEDICARE

## 2024-12-23 VITALS — HEIGHT: 62 IN | WEIGHT: 136 LBS | BODY MASS INDEX: 25.03 KG/M2

## 2024-12-23 DIAGNOSIS — Z12.31 ENCOUNTER FOR SCREENING MAMMOGRAM FOR MALIGNANT NEOPLASM OF BREAST: ICD-10-CM

## 2024-12-23 PROCEDURE — 77063 BREAST TOMOSYNTHESIS BI: CPT | Mod: TC

## 2024-12-30 RX ORDER — MUPIROCIN 20 MG/G
OINTMENT TOPICAL
Qty: 22 G | Refills: 5 | Status: SHIPPED | OUTPATIENT
Start: 2024-12-30

## 2025-01-24 DIAGNOSIS — E11.22 TYPE 2 DIABETES MELLITUS WITH STAGE 3B CHRONIC KIDNEY DISEASE, WITHOUT LONG-TERM CURRENT USE OF INSULIN: ICD-10-CM

## 2025-01-24 DIAGNOSIS — N18.32 TYPE 2 DIABETES MELLITUS WITH STAGE 3B CHRONIC KIDNEY DISEASE, WITHOUT LONG-TERM CURRENT USE OF INSULIN: ICD-10-CM

## 2025-01-24 RX ORDER — SEMAGLUTIDE 1.34 MG/ML
1 INJECTION, SOLUTION SUBCUTANEOUS
Qty: 9 ML | Refills: 0 | Status: SHIPPED | OUTPATIENT
Start: 2025-01-24 | End: 2026-01-24

## 2025-01-24 NOTE — TELEPHONE ENCOUNTER
----- Message from Skyler sent at 1/24/2025 10:20 AM CST -----  .Who Called: Claire Ang    Refill or New Rx:Refill  RX Name and Strength:semaglutide (OZEMPIC) 1 mg/dose (4 mg/3 mL)  How is the patient currently taking it? (ex. 1XDay):1x weekly   Is this a 30 day or 90 day RX:90   Local or Mail Order:local  List of preferred pharmacies on file (remove unneeded): [unfilled]  If different Pharmacy is requested, enter Pharmacy information here including location and phone number: [facility]   Ordering Provider:jen       Preferred Method of Contact: Phone Call  Patient's Preferred Phone Number on File: [phone]   Best Call Back Number, if different:  Additional Information:

## 2025-02-06 ENCOUNTER — LAB VISIT (OUTPATIENT)
Dept: LAB | Facility: HOSPITAL | Age: 75
End: 2025-02-06
Attending: FAMILY MEDICINE
Payer: MEDICARE

## 2025-02-06 DIAGNOSIS — E11.59 HYPERTENSION ASSOCIATED WITH DIABETES: ICD-10-CM

## 2025-02-06 DIAGNOSIS — I15.2 HYPERTENSION ASSOCIATED WITH DIABETES: ICD-10-CM

## 2025-02-06 DIAGNOSIS — E78.5 HYPERLIPIDEMIA ASSOCIATED WITH TYPE 2 DIABETES MELLITUS: ICD-10-CM

## 2025-02-06 DIAGNOSIS — E11.69 HYPERLIPIDEMIA ASSOCIATED WITH TYPE 2 DIABETES MELLITUS: ICD-10-CM

## 2025-02-06 DIAGNOSIS — N18.32 TYPE 2 DIABETES MELLITUS WITH STAGE 3B CHRONIC KIDNEY DISEASE, WITHOUT LONG-TERM CURRENT USE OF INSULIN: ICD-10-CM

## 2025-02-06 DIAGNOSIS — E03.9 ACQUIRED HYPOTHYROIDISM: ICD-10-CM

## 2025-02-06 DIAGNOSIS — E11.22 TYPE 2 DIABETES MELLITUS WITH STAGE 3B CHRONIC KIDNEY DISEASE, WITHOUT LONG-TERM CURRENT USE OF INSULIN: ICD-10-CM

## 2025-02-06 LAB
ALBUMIN SERPL-MCNC: 3.6 G/DL (ref 3.4–4.8)
ALBUMIN/GLOB SERPL: 1.1 RATIO (ref 1.1–2)
ALP SERPL-CCNC: 90 UNIT/L (ref 40–150)
ALT SERPL-CCNC: 13 UNIT/L (ref 0–55)
ANION GAP SERPL CALC-SCNC: 7 MEQ/L
AST SERPL-CCNC: 16 UNIT/L (ref 5–34)
BILIRUB SERPL-MCNC: 1.1 MG/DL
BUN SERPL-MCNC: 20.3 MG/DL (ref 9.8–20.1)
CALCIUM SERPL-MCNC: 9.4 MG/DL (ref 8.4–10.2)
CHLORIDE SERPL-SCNC: 106 MMOL/L (ref 98–107)
CO2 SERPL-SCNC: 27 MMOL/L (ref 23–31)
CREAT SERPL-MCNC: 1.16 MG/DL (ref 0.55–1.02)
CREAT/UREA NIT SERPL: 18
EST. AVERAGE GLUCOSE BLD GHB EST-MCNC: 148.5 MG/DL
GFR SERPLBLD CREATININE-BSD FMLA CKD-EPI: 50 ML/MIN/1.73/M2
GLOBULIN SER-MCNC: 3.4 GM/DL (ref 2.4–3.5)
GLUCOSE SERPL-MCNC: 134 MG/DL (ref 82–115)
HBA1C MFR BLD: 6.8 %
POTASSIUM SERPL-SCNC: 4.3 MMOL/L (ref 3.5–5.1)
PROT SERPL-MCNC: 7 GM/DL (ref 5.8–7.6)
SODIUM SERPL-SCNC: 140 MMOL/L (ref 136–145)
TSH SERPL-ACNC: 0.94 UIU/ML (ref 0.35–4.94)

## 2025-02-06 PROCEDURE — 36415 COLL VENOUS BLD VENIPUNCTURE: CPT

## 2025-02-06 PROCEDURE — 80053 COMPREHEN METABOLIC PANEL: CPT

## 2025-02-06 PROCEDURE — 84443 ASSAY THYROID STIM HORMONE: CPT

## 2025-02-06 PROCEDURE — 83036 HEMOGLOBIN GLYCOSYLATED A1C: CPT

## 2025-02-10 ENCOUNTER — OFFICE VISIT (OUTPATIENT)
Dept: FAMILY MEDICINE | Facility: CLINIC | Age: 75
End: 2025-02-10
Payer: MEDICARE

## 2025-02-10 VITALS
DIASTOLIC BLOOD PRESSURE: 82 MMHG | HEART RATE: 82 BPM | WEIGHT: 154.19 LBS | HEIGHT: 64 IN | BODY MASS INDEX: 26.32 KG/M2 | SYSTOLIC BLOOD PRESSURE: 132 MMHG | RESPIRATION RATE: 16 BRPM | TEMPERATURE: 98 F | OXYGEN SATURATION: 98 %

## 2025-02-10 DIAGNOSIS — F32.A DEPRESSIVE DISORDER: ICD-10-CM

## 2025-02-10 DIAGNOSIS — I15.2 HYPERTENSION ASSOCIATED WITH DIABETES: ICD-10-CM

## 2025-02-10 DIAGNOSIS — E78.5 HYPERLIPIDEMIA ASSOCIATED WITH TYPE 2 DIABETES MELLITUS: ICD-10-CM

## 2025-02-10 DIAGNOSIS — Z00.00 WELLNESS EXAMINATION: ICD-10-CM

## 2025-02-10 DIAGNOSIS — E11.59 HYPERTENSION ASSOCIATED WITH DIABETES: ICD-10-CM

## 2025-02-10 DIAGNOSIS — E11.22 TYPE 2 DIABETES MELLITUS WITH STAGE 3B CHRONIC KIDNEY DISEASE, WITHOUT LONG-TERM CURRENT USE OF INSULIN: Primary | ICD-10-CM

## 2025-02-10 DIAGNOSIS — E11.69 HYPERLIPIDEMIA ASSOCIATED WITH TYPE 2 DIABETES MELLITUS: ICD-10-CM

## 2025-02-10 DIAGNOSIS — E03.9 ACQUIRED HYPOTHYROIDISM: ICD-10-CM

## 2025-02-10 DIAGNOSIS — R10.2 PELVIC PRESSURE IN FEMALE: ICD-10-CM

## 2025-02-10 DIAGNOSIS — N18.32 TYPE 2 DIABETES MELLITUS WITH STAGE 3B CHRONIC KIDNEY DISEASE, WITHOUT LONG-TERM CURRENT USE OF INSULIN: Primary | ICD-10-CM

## 2025-02-10 DIAGNOSIS — F41.9 ANXIETY: ICD-10-CM

## 2025-02-10 LAB
BACTERIA #/AREA URNS AUTO: ABNORMAL /HPF
BILIRUB UR QL STRIP.AUTO: NEGATIVE
CLARITY UR: CLEAR
COLOR UR AUTO: ABNORMAL
GLUCOSE UR QL STRIP: NORMAL
HGB UR QL STRIP: ABNORMAL
KETONES UR QL STRIP: NEGATIVE
LEUKOCYTE ESTERASE UR QL STRIP: 250
NITRITE UR QL STRIP: ABNORMAL
PH UR STRIP: 5.5 [PH]
PROT UR QL STRIP: NEGATIVE
RBC #/AREA URNS AUTO: ABNORMAL /HPF
SP GR UR STRIP.AUTO: 1.02 (ref 1–1.03)
SQUAMOUS #/AREA URNS LPF: ABNORMAL /HPF
UROBILINOGEN UR STRIP-ACNC: NORMAL
WBC #/AREA URNS AUTO: ABNORMAL /HPF

## 2025-02-10 PROCEDURE — 81001 URINALYSIS AUTO W/SCOPE: CPT | Performed by: FAMILY MEDICINE

## 2025-02-10 PROCEDURE — 3075F SYST BP GE 130 - 139MM HG: CPT | Mod: CPTII,,, | Performed by: FAMILY MEDICINE

## 2025-02-10 PROCEDURE — 3044F HG A1C LEVEL LT 7.0%: CPT | Mod: CPTII,,, | Performed by: FAMILY MEDICINE

## 2025-02-10 PROCEDURE — 3079F DIAST BP 80-89 MM HG: CPT | Mod: CPTII,,, | Performed by: FAMILY MEDICINE

## 2025-02-10 PROCEDURE — 1101F PT FALLS ASSESS-DOCD LE1/YR: CPT | Mod: CPTII,,, | Performed by: FAMILY MEDICINE

## 2025-02-10 PROCEDURE — 87086 URINE CULTURE/COLONY COUNT: CPT | Performed by: FAMILY MEDICINE

## 2025-02-10 PROCEDURE — 99214 OFFICE O/P EST MOD 30 MIN: CPT | Mod: ,,, | Performed by: FAMILY MEDICINE

## 2025-02-10 PROCEDURE — 1126F AMNT PAIN NOTED NONE PRSNT: CPT | Mod: CPTII,,, | Performed by: FAMILY MEDICINE

## 2025-02-10 PROCEDURE — 3288F FALL RISK ASSESSMENT DOCD: CPT | Mod: CPTII,,, | Performed by: FAMILY MEDICINE

## 2025-02-10 PROCEDURE — 1159F MED LIST DOCD IN RCRD: CPT | Mod: CPTII,,, | Performed by: FAMILY MEDICINE

## 2025-02-10 PROCEDURE — 3008F BODY MASS INDEX DOCD: CPT | Mod: CPTII,,, | Performed by: FAMILY MEDICINE

## 2025-02-10 PROCEDURE — 1160F RVW MEDS BY RX/DR IN RCRD: CPT | Mod: CPTII,,, | Performed by: FAMILY MEDICINE

## 2025-02-10 RX ORDER — DOXAZOSIN 1 MG/1
1 TABLET ORAL
COMMUNITY

## 2025-02-10 RX ORDER — LEVOTHYROXINE SODIUM 75 UG/1
75 TABLET ORAL EVERY MORNING
Qty: 90 TABLET | Refills: 3 | Status: SHIPPED | OUTPATIENT
Start: 2025-02-10 | End: 2026-02-10

## 2025-02-10 RX ORDER — CIPROFLOXACIN 500 MG/1
500 TABLET ORAL EVERY 12 HOURS
Qty: 14 TABLET | Refills: 0 | Status: SHIPPED | OUTPATIENT
Start: 2025-02-10 | End: 2025-02-10

## 2025-02-10 RX ORDER — LEVOTHYROXINE SODIUM 75 UG/1
1 TABLET ORAL EVERY MORNING
COMMUNITY
End: 2025-02-10 | Stop reason: SDUPTHER

## 2025-02-10 RX ORDER — CIPROFLOXACIN 500 MG/1
500 TABLET ORAL EVERY 12 HOURS
Qty: 14 TABLET | Refills: 0 | Status: SHIPPED | OUTPATIENT
Start: 2025-02-10 | End: 2025-02-12

## 2025-02-10 RX ORDER — BUPROPION HYDROCHLORIDE 100 MG/1
100 TABLET, FILM COATED ORAL 2 TIMES DAILY
Qty: 180 TABLET | Refills: 2 | Status: SHIPPED | OUTPATIENT
Start: 2025-02-10 | End: 2025-02-11

## 2025-02-10 RX ORDER — CETIRIZINE HYDROCHLORIDE 10 MG/1
10 TABLET ORAL
COMMUNITY

## 2025-02-10 RX ORDER — FLASH GLUCOSE SENSOR
KIT MISCELLANEOUS
Qty: 2 KIT | Refills: 99 | Status: SHIPPED | OUTPATIENT
Start: 2025-02-10

## 2025-02-10 NOTE — ASSESSMENT & PLAN NOTE
Will collect urine. Will treat with cipro. Rx sent in. Take as directed. Encourage fluids. Monitor. Keep scheduled appts with urology. Contact clinic for concerns. Patient is agreeable to plan and verbalized understanding

## 2025-02-10 NOTE — PROGRESS NOTES
Please inform patient of results.    1. Ua shows concern for infection. Cipro rx sent in. Take as directed. Encourage fluids. Monitor symptoms. Will call with culture results when available.

## 2025-02-10 NOTE — PROGRESS NOTES
"Subjective:        Patient ID: Claire Ang is a 75 y.o. female.    Chief Complaint: Follow-up (DM follow up )      Patient presents to the clinic unaccompanied for follow up.  She is due for her wellness visit in july.  Patient has had issues with another "Claire Ang" filling controlled medications.  Patient states she is not on clonazepam.  She fills with [employer] or [employer] locally.  Does not fill with [employer].    She reports concern for uti.  Thinks continuation of previous uti that never resolved. Was treated with keflex per urology.   Has pelvic pressure.  Feels fatigue. She is seeing- Urology dr. Alfonso in .     Went to [place] to visit friends.  Had severe neck pain.  Went to er.  Did not stay.  Went home.  Saw c [place] on 10/7/24. Gave 4 steroid shots and rx for percocet and gabapentin. Is better     she has diabetes.  She reports compliance with her medications.  Her last A1c was 6.8 2025. Her last foot exam was 2024.  Her ophthalmologist is Dr. denise who told her that she had the beginnings of macular degeneration and was referred to Dr. Vick.   She was previously on metformin but this was stopped after her labs of 2021 showed elevated creatinine.  This was replaced with glipizide but made her nauseous so she stopped. she is currently on ozempic 1mg weekly.      She has hypertension and hyperlipidemia.  Her cardiologist is Dr. Daigle.  On zetia and statin.  Had open heart surgery in 10/2023.      She has hypothyroidism and is on Synthroid name brand.  Her calcium has been elevated in the past. Not taking calcium supplement. Sometimes takes tums. Pth, vitamin d and ionized calcium 3/2023 were wnl     Went to [facility] ER 3/2/23 for vomiting. Got IV fluids.   Cmp there calcium was 10.2.   had ct abd done as well which also reported emphysematous changed at lung bases.  Denies sob. Never smoker.  Brother- lung cancer (dx 69). Another brother-  from lung disease " "(not cancer) at age 78. Dad had asbestosis. Mom dx age 65 from "oat" cell carcinoma.  Mom was smoker. No other relatives smoked. Cxr 3/2023 showed hyperinflation. pulmonology- dr. Yanes in .         She has acid reflux and reports compliance with her PPI and H2 blocker.  Previously she saw Dr. Tavera and is requesting to establish with a new Gastro, Dr. Peng.  Her last colonoscopy was with Dr. tavera 1/16/15  Which showed external hemorrhoids and sigmoid diverticulosis      She has anxiety and depression and followed with Psychiatry, Dr. Aguirre.  She is on Wellbutrin.  She is filling this from our office. Needs refill. She is not on controlled meds.      She has ckd.  Seeing dr. Robison.  Has follow up  12/2023     she has a history of breast cancer and had a right mastectomy.  She is followed by Dr. Tsang.  Appt 8/20/23 with labs.   She was on tamoxifen.  She wears a prosthetic. mammogram 7/2022 was normal. breast mri 5/2023 wnl. She has had a complete hysterectomy at age 37 her heavy menses and therefore no longer does Pap smears     She has osteoporosis and was on Prolia.  She takes vitamin-D.  does not take calcium due to elevated calcium.  Her last bone density test was 8/2021     she has a personal history of skin cancer.  Her dermatologist is Dr. Trujillo.      She is allergic to sulfa, hydrocodone, meperidine and morphine. She declines immunizations today     She had left cataract surgery with dr. Zapien 2/2023     Got covid 11/2022 while on cruise. Took ivermectin. Is better.              Review of Systems   Constitutional: Negative.    HENT: Negative.     Respiratory: Negative.     Cardiovascular: Negative.    Gastrointestinal: Negative.    Genitourinary:  Positive for pelvic pain. Negative for difficulty urinating.         Review of patient's allergies indicates:   Allergen Reactions    Hydrocodone bitartrate      Other reaction(s): GI Intolerance  Other reaction(s): GI " "Intolerance  Acetaminophen hydrocodone bitartrate  Acetaminophen hydrocodone bitartrate      Sulfamethoxazole-trimethoprim      Other reaction(s): stomach pain  Other reaction(s): stomach pain      Hydrocodone-acetaminophen Nausea And Vomiting     Other reaction(s): Visual hallucinations  Other reaction(s): Visual hallucinations      Meperidine Nausea And Vomiting     Other reaction(s): GI Intolerance, Visual hallucinations  Other reaction(s): GI Intolerance, Visual hallucinations    Other reaction(s): Vomitting    Morphine Palpitations     Other reaction(s): chest tightness  Other reaction(s): chest tightness        Vitals:    02/10/25 1019   BP: 132/82   BP Location: Left arm   Patient Position: Sitting   Pulse: 82   Resp: 16   Temp: 98.2 °F (36.8 °C)   TempSrc: Temporal   SpO2: 98%   Weight: 69.9 kg (154 lb 3.2 oz)   Height: 5' 4" (1.626 m)      Social History     Socioeconomic History    Marital status:    Tobacco Use    Smoking status: Never     Passive exposure: Never    Smokeless tobacco: Never   Substance and Sexual Activity    Alcohol use: Never    Drug use: Not Currently    Sexual activity: Not Currently     Partners: Male     Birth control/protection: None   Social History Narrative    ** Merged History Encounter **          Social Drivers of Health     Financial Resource Strain: Low Risk  (2/3/2025)    Overall Financial Resource Strain (CARDIA)     Difficulty of Paying Living Expenses: Not hard at all   Food Insecurity: Food Insecurity Present (2/3/2025)    Hunger Vital Sign     Worried About Running Out of Food in the Last Year: Never true     Ran Out of Food in the Last Year: Sometimes true   Transportation Needs: Patient Declined (10/8/2023)    [facility], [facility]    PRAPARE - Transportation     Lack of Transportation (Medical): " Patient declined     Lack of Transportation (Non-Medical): Patient declined   Physical Activity: Sufficiently Active (2/3/2025)    Exercise Vital Sign     Days of Exercise per Week: 3 days     Minutes of Exercise per Session: 50 min   Stress: Stress Concern Present (2/3/2025)    [employer] - Occupational Stress Questionnaire     Feeling of Stress : To some extent   Housing Stability: Unknown (2/3/2025)    Housing Stability Vital Sign     Unable to Pay for Housing in the Last Year: No      Family History   Problem Relation Name Age of Onset    Cancer Mother Lung     Alcohol abuse Mother Lung     Cancer Brother Lung         Cancer    Lung disease Brother Lung           Objective:     Physical Exam  Vitals and nursing note reviewed.   Constitutional:       Appearance: Normal appearance. She is normal weight.   HENT:      Head: Normocephalic and atraumatic.      Nose: Nose normal.      Mouth/Throat:      Mouth: Mucous membranes are moist.      Pharynx: Oropharynx is clear.   Eyes:      Extraocular Movements: Extraocular movements intact.   Cardiovascular:      Rate and Rhythm: Normal rate and regular rhythm.      Pulses: Normal pulses.      Heart sounds: Normal heart sounds.   Pulmonary:      Effort: Pulmonary effort is normal.      Breath sounds: Normal breath sounds.   Musculoskeletal:         General: Normal range of motion.      Cervical back: Normal range of motion.   Skin:     General: Skin is warm and dry.   Neurological:      General: No focal deficit present.      Mental Status: She is alert and oriented to person, place, and time. Mental status is at baseline.   Psychiatric:         Mood and Affect: Mood normal.       Current Outpatient Medications on File Prior to Visit   Medication Sig Dispense Refill    atorvastatin (LIPITOR) 40 MG tablet Take 40 mg by mouth every evening.      cetirizine (ZYRTEC) 10 MG tablet Take 10 mg by mouth.      cyanocobalamin, vitamin B-12, 1,000 mcg  Subl Take by mouth once daily.      doxazosin (CARDURA) 1 MG tablet Take 1 mg by mouth.      flash glucose scanning reader Misc 1 each by Misc.(Non-Drug; Combo Route) route Daily. 200 each 11    methocarbamoL (ROBAXIN) 750 MG Tab Take 750 mg by mouth 3 (three) times daily.      mupirocin (BACTROBAN) 2 % ointment APPLY TOPICALLY 3 TIMES A  DAY 22 g 5    semaglutide (OZEMPIC) 1 mg/dose (4 mg/3 mL) Inject 1 mg into the skin every 7 days. 9 mL 0    valACYclovir (VALTREX) 1000 MG tablet Take by mouth as needed.      [DISCONTINUED] FREESTYLE PAULO 14 DAY SENSOR Kit USE TO MONITOR BLOOD SUGAR (DX E11.9) 2 kit 11    [DISCONTINUED] levothyroxine (SYNTHROID) 75 MCG tablet Take 1 tablet by mouth every morning.      [DISCONTINUED] WELLBUTRIN  mg TBSR 12 hr tablet Take 1 tablet (100 mg total) by mouth 2 (two) times daily. 180 tablet 2    ezetimibe (ZETIA) 10 mg tablet Take 1 tablet (10 mg total) by mouth every evening. 90 tablet 3    [DISCONTINUED] cetirizine (ZYRTEC) 5 MG tablet Take 5 mg by mouth daily as needed.      [DISCONTINUED] levothyroxine (SYNTHROID) 25 MCG tablet Take 0.5 tablets (12.5 mcg total) by mouth before breakfast. Take with Synthroid 50 mcg tablet 45 tablet 3    [DISCONTINUED] levothyroxine (SYNTHROID) 50 MCG tablet Take 1 tablet (50 mcg total) by mouth before breakfast. Take with Synthroid 12.5 90 tablet 3    [DISCONTINUED] oxyCODONE-acetaminophen (PERCOCET) 5-325 mg per tablet Take 1 tablet by mouth every 6 (six) hours as needed.      [DISCONTINUED] pantoprazole (PROTONIX) 40 MG tablet Take 40 mg by mouth every morning.       No current facility-administered medications on file prior to visit.     Health Maintenance   Topic Date Due    COVID-19 Vaccine (3 - Pfizer risk series) 04/19/2021    TETANUS VACCINE  02/10/2026 (Originally 9/28/2017)    Shingles Vaccine (2 of 2) 02/10/2026 (Originally 2/2/2021)    Diabetes Urine Screening  07/29/2025    Foot Exam  07/31/2025    Hemoglobin A1c  08/06/2025     DEXA Scan  08/21/2025    Mammogram  08/22/2025    Lipid Panel  10/10/2025    Diabetic Eye Exam  10/31/2025    Colorectal Cancer Screening  08/05/2029    Hepatitis C Screening  Completed    RSV Vaccine (Age 60+ and Pregnant patients)  Completed    Pneumococcal Vaccines (Age 50+)  Completed    Influenza Vaccine  Addressed      Results for orders placed or performed in visit on 02/10/25   Urinalysis    Collection Time: 02/10/25 10:46 AM   Result Value Ref Range    Color, UA Light-Yellow Yellow, Light-Yellow, Colorless, Straw, Dark-Yellow    Appearance, UA Clear Clear    Specific Gravity, UA 1.016 1.005 - 1.030    pH, UA 5.5 5.0 - 8.5    Protein, UA Negative Negative    Glucose, UA Normal Negative, Normal    Ketones, UA Negative Negative    Blood, UA 1+ (A) Negative    Bilirubin, UA Negative Negative    Urobilinogen, UA Normal 0.2, 1.0, Normal    Nitrites, UA 1+ (A) Negative    Leukocyte Esterase,  (A) Negative    RBC, UA 6-10 (A) None Seen, 0-2, 3-5, 0-5 /HPF    WBC, UA 21-50 (A) None Seen, 0-2, 3-5, 0-5 /HPF    Bacteria, UA Many (A) None Seen, Trace /HPF    Squamous Epithelial Cells, UA Trace None Seen, Trace, Rare /HPF          Assessment & Plan:     Active Problem List with Overview Notes    Diagnosis Date Noted    Pelvic pressure in female 02/10/2025    Neck pain 10/15/2024    Fatigue 09/23/2024    Wellness examination 07/31/2024    Urinary tract infection without hematuria 07/31/2024    Callus of foot 07/31/2024    Compression fracture of T12 vertebra 04/11/2024    Coronary artery disease involving native coronary artery of native heart with unstable angina pectoris 10/11/2023    Breast cancer screening by mammogram 08/15/2023    Hypercalcemia 03/27/2023    Other emphysema 03/27/2023    Advanced care planning/counseling discussion 02/07/2023    Gout 02/07/2023    Stage 3b chronic kidney disease     Chronic cystitis 10/26/2022    Hyperlipidemia associated with type 2 diabetes mellitus 08/26/2022     Hypertension associated with diabetes 08/26/2022    Postmenopausal 08/26/2022    History of skin cancer 08/26/2022    Malignant neoplasm of central portion of right breast in female, estrogen receptor positive 08/05/2022    Acute pain of left shoulder 07/13/2022    Diabetes mellitus 05/26/2022    Hypothyroidism 05/26/2022    Anxiety 05/26/2022     Formatting of this note might be different from the original.   Last Assessment & Plan:     Formatting of this note might be different from the original.    Stable on current prescription meds. Keep appointments with psych      Depressive disorder 05/26/2022     Formatting of this note might be different from the original.   Last Assessment & Plan:     Formatting of this note might be different from the original.    Stable on current prescription meds. Keep appointments with psych      History of breast cancer 05/26/2022    Genital herpes simplex 05/26/2022    Malignant neoplasm of skin of upper extremity 05/26/2022    Age-related osteoporosis without current pathological fracture 04/05/2022    Gastroesophageal reflux disease 02/08/2018     Formatting of this note might be different from the original.   Last Assessment & Plan:     Formatting of this note might be different from the original.    On PPI and H2 blocker.  Needs to establish with new GI since dr. tavera retired.      Osteoporosis 01/11/2017       1. Type 2 diabetes mellitus with stage 3b chronic kidney disease, without long-term current use of insulin  Assessment & Plan:  Lab Results   Component Value Date    HGBA1C 6.8 02/06/2025     Foot exam 7/2024  Eye exam with dr. Zapien.   Urine micro 7/2024    Encouraged compliance with dmii diet.     Was having hypoglycemic episodes on glimepiride.     on ozempic  1mg. Doing well. Will send in refill on CGM sensor. On statin      Contact clinic for concerns.       Orders:  -     CBC Auto Differential; Future; Expected date: 08/10/2025  -     Comprehensive Metabolic  Panel; Future; Expected date: 08/10/2025  -     Lipid Panel; Future; Expected date: 08/10/2025  -     TSH; Future; Expected date: 08/10/2025  -     Hemoglobin A1C; Future; Expected date: 08/10/2025  -     Urinalysis; Future; Expected date: 08/10/2025  -     Vitamin D; Future; Expected date: 08/10/2025  -     Microalbumin/Creatinine Ratio, Urine; Future; Expected date: 08/10/2025  -     FREESTYLE PAULO 14 DAY SENSOR Kit; USE TO MONITOR BLOOD SUGAR (DX E11.9)  Dispense: 2 kit; Refill: 99    2. Anxiety  Overview:  Formatting of this note might be different from the original.   Last Assessment & Plan:     Formatting of this note might be different from the original.    Stable on current prescription meds. Keep appointments with psych    Assessment & Plan:  Stable on current prescription med- wellbutrin. States not on clonazepam. No longer seeing psych. We refill wellbutrin    Orders:  -     CBC Auto Differential; Future; Expected date: 08/10/2025  -     Comprehensive Metabolic Panel; Future; Expected date: 08/10/2025  -     Lipid Panel; Future; Expected date: 08/10/2025  -     TSH; Future; Expected date: 08/10/2025  -     Hemoglobin A1C; Future; Expected date: 08/10/2025  -     Urinalysis; Future; Expected date: 08/10/2025  -     Vitamin D; Future; Expected date: 08/10/2025  -     Microalbumin/Creatinine Ratio, Urine; Future; Expected date: 08/10/2025  -     WELLBUTRIN  mg TBSR 12 hr tablet; Take 1 tablet (100 mg total) by mouth 2 (two) times daily. BRAND NAME NECESSARY  Dispense: 180 tablet; Refill: 2    3. Depressive disorder  Overview:  Formatting of this note might be different from the original.   Last Assessment & Plan:     Formatting of this note might be different from the original.    Stable on current prescription meds. Keep appointments with psych    Assessment & Plan:  See anxiety A&P    Orders:  -     CBC Auto Differential; Future; Expected date: 08/10/2025  -     Comprehensive Metabolic Panel;  "Future; Expected date: 08/10/2025  -     Lipid Panel; Future; Expected date: 08/10/2025  -     TSH; Future; Expected date: 08/10/2025  -     Hemoglobin A1C; Future; Expected date: 08/10/2025  -     Urinalysis; Future; Expected date: 08/10/2025  -     Vitamin D; Future; Expected date: 08/10/2025  -     Microalbumin/Creatinine Ratio, Urine; Future; Expected date: 08/10/2025  -     WELLBUTRIN  mg TBSR 12 hr tablet; Take 1 tablet (100 mg total) by mouth 2 (two) times daily. BRAND NAME NECESSARY  Dispense: 180 tablet; Refill: 2    4. Hypertension associated with diabetes  Assessment & Plan:  BP at goal  Continue current prescription meds. Keep appts with [employer]    Orders:  -     CBC Auto Differential; Future; Expected date: 08/10/2025  -     Comprehensive Metabolic Panel; Future; Expected date: 08/10/2025  -     Lipid Panel; Future; Expected date: 08/10/2025  -     TSH; Future; Expected date: 08/10/2025  -     Hemoglobin A1C; Future; Expected date: 08/10/2025  -     Urinalysis; Future; Expected date: 08/10/2025  -     Vitamin D; Future; Expected date: 08/10/2025  -     Microalbumin/Creatinine Ratio, Urine; Future; Expected date: 08/10/2025    5. Hyperlipidemia associated with type 2 diabetes mellitus  Assessment & Plan:  On zetia and statin. Keep appts with cards      Lab Results   Component Value Date    CHOL 140 10/10/2024    CHOL 124 07/29/2024    CHOL 115 06/06/2024     Lab Results   Component Value Date    HDL 63 (H) 10/10/2024    HDL 61 (H) 07/29/2024    HDL 53 06/06/2024     Lab Results   Component Value Date    LDLCALC 60 10/12/2023     Lab Results   Component Value Date    TRIG 121 10/10/2024    TRIG 58 07/29/2024    TRIG 44 06/06/2024       No results found for: "CHOLHDL"      Orders:  -     CBC Auto Differential; Future; Expected date: 08/10/2025  -     Comprehensive Metabolic Panel; Future; Expected date: 08/10/2025  -     Lipid Panel; Future; Expected date: 08/10/2025  -     TSH; Future; Expected date: " 08/10/2025  -     Hemoglobin A1C; Future; Expected date: 08/10/2025  -     Urinalysis; Future; Expected date: 08/10/2025  -     Vitamin D; Future; Expected date: 08/10/2025  -     Microalbumin/Creatinine Ratio, Urine; Future; Expected date: 08/10/2025    6. Acquired hypothyroidism  Assessment & Plan:  Lab Results   Component Value Date    TSH 0.945 02/06/2025     Stable on synthroid dose    Orders:  -     CBC Auto Differential; Future; Expected date: 08/10/2025  -     Comprehensive Metabolic Panel; Future; Expected date: 08/10/2025  -     Lipid Panel; Future; Expected date: 08/10/2025  -     TSH; Future; Expected date: 08/10/2025  -     Hemoglobin A1C; Future; Expected date: 08/10/2025  -     Urinalysis; Future; Expected date: 08/10/2025  -     Vitamin D; Future; Expected date: 08/10/2025  -     Microalbumin/Creatinine Ratio, Urine; Future; Expected date: 08/10/2025  -     levothyroxine (SYNTHROID) 75 MCG tablet; Take 1 tablet (75 mcg total) by mouth every morning. BRAND NAME NECESSARY  Dispense: 90 tablet; Refill: 3    7. Pelvic pressure in female  Assessment & Plan:  Will collect urine. Will treat with cipro. Rx sent in. Take as directed. Encourage fluids. Monitor. Keep scheduled appts with urology. Contact clinic for concerns. Patient is agreeable to plan and verbalized understanding    Orders:  -     Urinalysis  -     Urine Culture High Risk  -     Discontinue: ciprofloxacin HCl (CIPRO) 500 MG tablet; Take 1 tablet (500 mg total) by mouth every 12 (twelve) hours. for 7 days  Dispense: 14 tablet; Refill: 0  -     ciprofloxacin HCl (CIPRO) 500 MG tablet; Take 1 tablet (500 mg total) by mouth every 12 (twelve) hours. for 7 days  Dispense: 14 tablet; Refill: 0    8. Wellness examination  -     CBC Auto Differential; Future; Expected date: 08/10/2025  -     Comprehensive Metabolic Panel; Future; Expected date: 08/10/2025  -     Lipid Panel; Future; Expected date: 08/10/2025  -     TSH; Future; Expected date:  08/10/2025  -     Hemoglobin A1C; Future; Expected date: 08/10/2025  -     Urinalysis; Future; Expected date: 08/10/2025  -     Vitamin D; Future; Expected date: 08/10/2025  -     Microalbumin/Creatinine Ratio, Urine; Future; Expected date: 08/10/2025         Follow up in about 6 months (around 8/10/2025) for Medicare Wellness with labs.

## 2025-02-10 NOTE — ASSESSMENT & PLAN NOTE
Lab Results   Component Value Date    HGBA1C 6.8 02/06/2025     Foot exam 7/2024  Eye exam with dr. Zapien.   Urine micro 7/2024    Encouraged compliance with dmii diet.     Was having hypoglycemic episodes on glimepiride.     on ozempic  1mg. Doing well. Will send in refill on CGM sensor. On statin      Contact clinic for concerns.

## 2025-02-10 NOTE — ASSESSMENT & PLAN NOTE
Stable on current prescription med- wellbutrin. States not on clonazepam. No longer seeing psych. We refill wellbutrin

## 2025-02-11 DIAGNOSIS — F41.9 ANXIETY: ICD-10-CM

## 2025-02-11 DIAGNOSIS — F32.A DEPRESSIVE DISORDER: ICD-10-CM

## 2025-02-11 RX ORDER — BUPROPION HYDROCHLORIDE 100 MG/1
100 TABLET, EXTENDED RELEASE ORAL 2 TIMES DAILY
Qty: 180 TABLET | Refills: 3 | Status: SHIPPED | OUTPATIENT
Start: 2025-02-11 | End: 2026-02-11

## 2025-02-12 ENCOUNTER — TELEPHONE (OUTPATIENT)
Dept: FAMILY MEDICINE | Facility: CLINIC | Age: 75
End: 2025-02-12
Payer: MEDICARE

## 2025-02-12 LAB — BACTERIA UR CULT: ABNORMAL

## 2025-02-12 RX ORDER — NITROFURANTOIN 25; 75 MG/1; MG/1
100 CAPSULE ORAL 2 TIMES DAILY
Qty: 20 CAPSULE | Refills: 0 | Status: SHIPPED | OUTPATIENT
Start: 2025-02-12 | End: 2025-02-22

## 2025-02-12 NOTE — PROGRESS NOTES
Please inform patient of results.    1. Final urine culture shows infection with ecoli resistant to cipro which was sent in. Please have her stop cipro. I will send in macrobid. Take as directed. Encourage fluids. Ok to send results to her urologist if she would like.     Other labwork within acceptable ranges.

## 2025-02-12 NOTE — TELEPHONE ENCOUNTER
----- Message from Jany Magana MD sent at 2/12/2025 11:02 AM CST -----  Please inform patient of results.    1. Final urine culture shows infection with ecoli resistant to cipro which was sent in. Please have her stop cipro. I will send in macrobid. Take as directed. Encourage fluids. Ok to send results to her urologist if she would like.     Other labwork within acceptable ranges.

## 2025-02-13 NOTE — PROGRESS NOTES
She is allergic to bactrim.  Bacteria is resistant to cipro and levaquin.  The other meds it is sensitive to are IV/IM antibiotics. So the only oral med left is macrobid. I recommend she take since it is not an allergy. Please also make sure she follows up with dr. Gamboa office asap

## 2025-02-14 ENCOUNTER — TELEPHONE (OUTPATIENT)
Dept: FAMILY MEDICINE | Facility: CLINIC | Age: 75
End: 2025-02-14
Payer: MEDICARE

## 2025-02-14 RX ORDER — CIPROFLOXACIN AND DEXAMETHASONE 3; 1 MG/ML; MG/ML
4 SUSPENSION/ DROPS AURICULAR (OTIC) 2 TIMES DAILY
Qty: 7.5 ML | Refills: 0 | Status: SHIPPED | OUTPATIENT
Start: 2025-02-14

## 2025-02-14 NOTE — TELEPHONE ENCOUNTER
Patient notified and verbalized understanding  Has appt with Dr. Gamboa office feb 26 to follow up and they confirmed they received her results    She asked me to ask if you could send anything in such as an ear drop for ear infection as well? Stated all night she has ear pain and pressure in her ear. I told her I would ask

## 2025-02-14 NOTE — TELEPHONE ENCOUNTER
----- Message from Jany Magana MD sent at 2/13/2025  5:01 PM CST -----  She is allergic to bactrim.  Bacteria is resistant to cipro and levaquin.  The other meds it is sensitive to are IV/IM antibiotics. So the only oral med left is macrobid. I recommend she take since it is not an allergy. Please also make sure she follows up with dr. Gamboa office asap

## 2025-02-14 NOTE — TELEPHONE ENCOUNTER
I have signed for the following orders AND/OR meds.  Please call the patient and ask the patient to schedule the testing AND/OR inform about any medications that were sent.        Medications Ordered This Encounter   Medications    ciprofloxacin-dexAMETHasone 0.3-0.1% (CIPRODEX) 0.3-0.1 % DrpS     Sig: Place 4 drops into both ears 2 (two) times daily.     Dispense:  7.5 mL     Refill:  0

## 2025-02-19 ENCOUNTER — OFFICE VISIT (OUTPATIENT)
Dept: FAMILY MEDICINE | Facility: CLINIC | Age: 75
End: 2025-02-19
Payer: MEDICARE

## 2025-02-19 VITALS
HEART RATE: 104 BPM | HEIGHT: 64 IN | SYSTOLIC BLOOD PRESSURE: 128 MMHG | BODY MASS INDEX: 26.43 KG/M2 | WEIGHT: 154.81 LBS | DIASTOLIC BLOOD PRESSURE: 78 MMHG | TEMPERATURE: 98 F | RESPIRATION RATE: 19 BRPM | OXYGEN SATURATION: 96 %

## 2025-02-19 DIAGNOSIS — H66.91 RIGHT OTITIS MEDIA, UNSPECIFIED OTITIS MEDIA TYPE: Primary | ICD-10-CM

## 2025-02-19 DIAGNOSIS — A08.4 VIRAL GASTROENTERITIS: ICD-10-CM

## 2025-02-19 PROCEDURE — 1160F RVW MEDS BY RX/DR IN RCRD: CPT | Mod: CPTII,,, | Performed by: NURSE PRACTITIONER

## 2025-02-19 PROCEDURE — 3008F BODY MASS INDEX DOCD: CPT | Mod: CPTII,,, | Performed by: NURSE PRACTITIONER

## 2025-02-19 PROCEDURE — 1159F MED LIST DOCD IN RCRD: CPT | Mod: CPTII,,, | Performed by: NURSE PRACTITIONER

## 2025-02-19 PROCEDURE — 3288F FALL RISK ASSESSMENT DOCD: CPT | Mod: CPTII,,, | Performed by: NURSE PRACTITIONER

## 2025-02-19 PROCEDURE — 3078F DIAST BP <80 MM HG: CPT | Mod: CPTII,,, | Performed by: NURSE PRACTITIONER

## 2025-02-19 PROCEDURE — 3074F SYST BP LT 130 MM HG: CPT | Mod: CPTII,,, | Performed by: NURSE PRACTITIONER

## 2025-02-19 PROCEDURE — 3044F HG A1C LEVEL LT 7.0%: CPT | Mod: CPTII,,, | Performed by: NURSE PRACTITIONER

## 2025-02-19 PROCEDURE — 1101F PT FALLS ASSESS-DOCD LE1/YR: CPT | Mod: CPTII,,, | Performed by: NURSE PRACTITIONER

## 2025-02-19 PROCEDURE — 1125F AMNT PAIN NOTED PAIN PRSNT: CPT | Mod: CPTII,,, | Performed by: NURSE PRACTITIONER

## 2025-02-19 RX ORDER — FLUCONAZOLE 150 MG/1
150 TABLET ORAL DAILY
Qty: 1 TABLET | Refills: 0 | Status: SHIPPED | OUTPATIENT
Start: 2025-02-19 | End: 2025-02-20

## 2025-02-19 RX ORDER — AMOXICILLIN AND CLAVULANATE POTASSIUM 875; 125 MG/1; MG/1
1 TABLET, FILM COATED ORAL 2 TIMES DAILY
Qty: 14 TABLET | Refills: 0 | Status: SHIPPED | OUTPATIENT
Start: 2025-02-19 | End: 2025-02-26

## 2025-02-19 NOTE — ASSESSMENT & PLAN NOTE
Rx Augmentin  May take Tylenol prn  Rx Diflucan (states yeast infection when she takes Augmentin)  Instructed to continue to monitor symptoms  F/U if no improvement  Keep appt with PCP for follow up  Pt is agreeable to plan and verbalizes understanding

## 2025-02-19 NOTE — ASSESSMENT & PLAN NOTE
Overall, states improved today; no pain or diarrhea on today  Pt is non-toxic appearing  Encouraged fluids (Pedialyte or Gatorade)  Instructed to continue to monitor symptoms  F/U if no improvement  Report to ED for any CP, SOB, and/or worsening symptoms  Pt is agreeable to plan and verbalizes understanding

## 2025-02-19 NOTE — PROGRESS NOTES
Subjective:      Patient ID: Claire Ang is a 75 y.o. White female      Chief Complaint: Right Ear Pain       Past Medical History:   Diagnosis Date    Anxiety     CAD (coronary artery disease)     Callus of foot 07/31/2024    Chronic cystitis 10/26/2022    Compression fracture of T12 vertebra 04/11/2024    Coronary artery disease involving native coronary artery of native heart with unstable angina pectoris 10/11/2023    Depressive disorder 05/26/2022    Formatting of this note might be different from the original.   Last Assessment & Plan:     Formatting of this note might be different from the original.    Stable on current prescription meds. Keep appointments with psych      Diabetes mellitus     Fibromyalgia     Gastric ulcer     Gastroesophageal reflux disease 02/08/2018    Formatting of this note might be different from the original.   Last Assessment & Plan:     Formatting of this note might be different from the original.    On PPI and H2 blocker.  Needs to establish with new GI since dr. tavera retired.      Genital herpes simplex 05/26/2022    Gout 02/07/2023    History of breast cancer 05/26/2022    History of skin cancer 08/26/2022    Hypercalcemia 03/27/2023    Hyperlipidemia associated with type 2 diabetes mellitus 08/26/2022    Hypothyroidism     Malignant neoplasm of central portion of right breast in female, estrogen receptor positive 08/05/2022    Nephrolithiasis     NSTEMI (non-ST elevated myocardial infarction)     Onychomycosis     Osteoporosis     Other emphysema 03/27/2023    Paroxysmal atrial fibrillation     Postmenopausal 08/26/2022    Stage 3b chronic kidney disease         HPI  Otalgia   There is pain in the right ear. This is a new problem. Episode onset: 2 weeks. There has been no fever. The pain is at a severity of 8/10. Associated symptoms include abdominal pain and diarrhea. Pertinent negatives include no coughing, ear discharge, hearing loss, sore throat or vomiting.  Treatments tried: Ciprodex. The treatment provided no relief.     C/O of generalized ABD pain on yesterday.  Associated with nausea and diarrhea.  Denies any fever, chills, body aches, CP, or SOB.  States no pain on today, just soreness.  Diarrhea has subsided.      Patient Care Team:  Jany Magana MD as PCP - General (Family Medicine)  Mino Vick Jr., MD as Consulting Physician (Ophthalmology)  Marlon Reich II, MD as Consulting Physician (Ophthalmology)  Corey Daigle MD as Consulting Physician (Cardiology)  Jasmyne Tsang MD as Consulting Physician (Oncology)  Gordon Trujillo MD (Dermatology)  Stefanie Zapien MD as Consulting Physician (Ophthalmology)  Jagdeep Gamboa MD as Consulting Physician (Urology)  Chino Yanes MD as Consulting Physician (Pulmonary Disease)  Racheal Robison MD as Consulting Physician (Nephrology)  Demetrio Hernandez MD (Emergency Medicine)  Enrico Peng MD as Consulting Physician (Gastroenterology)      Review of Systems   Constitutional: Negative.  Negative for appetite change, chills and fever.   HENT:  Positive for ear pain. Negative for ear discharge, hearing loss and sore throat.    Eyes: Negative.  Negative for discharge and redness.   Respiratory: Negative.  Negative for cough and shortness of breath.    Cardiovascular: Negative.  Negative for chest pain.   Gastrointestinal:  Positive for abdominal pain, diarrhea and nausea. Negative for vomiting.   Endocrine: Negative.    Genitourinary: Negative.    Integumentary:  Negative.   Allergic/Immunologic: Negative.    Neurological: Negative.    Psychiatric/Behavioral: Negative.     All other systems reviewed and are negative.          Objective:      Vitals:    02/19/25 1518   BP: 128/78   Pulse: 104   Resp: 19   Temp: 97.5 °F (36.4 °C)      Body mass index is 26.57 kg/m².     Physical Exam  Vitals reviewed.   Constitutional:       Appearance: Normal appearance.   HENT:      Head: Normocephalic.       Right Ear: Tympanic membrane is erythematous and bulging.      Left Ear: Tympanic membrane is not erythematous.      Mouth/Throat:      Mouth: Mucous membranes are moist.   Eyes:      Conjunctiva/sclera: Conjunctivae normal.      Pupils: Pupils are equal, round, and reactive to light.   Cardiovascular:      Rate and Rhythm: Normal rate and regular rhythm.   Pulmonary:      Effort: Pulmonary effort is normal. No respiratory distress.      Breath sounds: Normal breath sounds.   Abdominal:      General: Bowel sounds are normal. There is no distension.      Tenderness: There is no abdominal tenderness. There is no right CVA tenderness, left CVA tenderness, guarding or rebound.   Musculoskeletal:         General: Normal range of motion.      Cervical back: Normal range of motion and neck supple.   Skin:     General: Skin is warm and dry.   Neurological:      Mental Status: She is alert and oriented to person, place, and time.   Psychiatric:         Mood and Affect: Mood normal.          Current Medications[1]    Assessment & Plan:     Problem List Items Addressed This Visit          ENT    Right otitis media - Primary    Rx Augmentin  May take Tylenol prn  Rx Diflucan (states yeast infection when she takes Augmentin)  Instructed to continue to monitor symptoms  F/U if no improvement  Keep appt with PCP for follow up  Pt is agreeable to plan and verbalizes understanding           Relevant Medications    amoxicillin-clavulanate 875-125mg (AUGMENTIN) 875-125 mg per tablet       ID    Viral gastroenteritis    Overall, states improved today; no pain or diarrhea on today  Pt is non-toxic appearing  Encouraged fluids (Pedialyte or Gatorade)  Instructed to continue to monitor symptoms  F/U if no improvement  Report to ED for any CP, SOB, and/or worsening symptoms  Pt is agreeable to plan and verbalizes understanding                                [1]   Current Outpatient Medications:     amoxicillin-clavulanate 875-125mg  (AUGMENTIN) 875-125 mg per tablet, Take 1 tablet by mouth 2 (two) times daily. for 7 days, Disp: 14 tablet, Rfl: 0    atorvastatin (LIPITOR) 40 MG tablet, Take 40 mg by mouth every evening., Disp: , Rfl:     buPROPion (WELLBUTRIN SR) 100 MG TBSR 12 hr tablet, Take 1 tablet (100 mg total) by mouth 2 (two) times daily., Disp: 180 tablet, Rfl: 3    cetirizine (ZYRTEC) 10 MG tablet, Take 10 mg by mouth., Disp: , Rfl:     ciprofloxacin-dexAMETHasone 0.3-0.1% (CIPRODEX) 0.3-0.1 % DrpS, Place 4 drops into both ears 2 (two) times daily., Disp: 7.5 mL, Rfl: 0    cyanocobalamin, vitamin B-12, 1,000 mcg Subl, Take by mouth once daily., Disp: , Rfl:     doxazosin (CARDURA) 1 MG tablet, Take 1 mg by mouth., Disp: , Rfl:     ezetimibe (ZETIA) 10 mg tablet, Take 1 tablet (10 mg total) by mouth every evening., Disp: 90 tablet, Rfl: 3    flash glucose scanning reader Misc, 1 each by Misc.(Non-Drug; Combo Route) route Daily., Disp: 200 each, Rfl: 11    fluconazole (DIFLUCAN) 150 MG Tab, Take 1 tablet (150 mg total) by mouth once daily. for 1 day, Disp: 1 tablet, Rfl: 0    FREESTYLE PAULO 14 DAY SENSOR Kit, USE TO MONITOR BLOOD SUGAR (DX E11.9), Disp: 2 kit, Rfl: 99    levothyroxine (SYNTHROID) 75 MCG tablet, Take 1 tablet (75 mcg total) by mouth every morning. BRAND NAME NECESSARY, Disp: 90 tablet, Rfl: 3    methocarbamoL (ROBAXIN) 750 MG Tab, Take 750 mg by mouth 3 (three) times daily., Disp: , Rfl:     mupirocin (BACTROBAN) 2 % ointment, APPLY TOPICALLY 3 TIMES A  DAY, Disp: 22 g, Rfl: 5    nitrofurantoin, macrocrystal-monohydrate, (MACROBID) 100 MG capsule, Take 1 capsule (100 mg total) by mouth 2 (two) times daily. for 10 days, Disp: 20 capsule, Rfl: 0    semaglutide (OZEMPIC) 1 mg/dose (4 mg/3 mL), Inject 1 mg into the skin every 7 days., Disp: 9 mL, Rfl: 0    valACYclovir (VALTREX) 1000 MG tablet, Take by mouth as needed., Disp: , Rfl:

## 2025-02-24 ENCOUNTER — TELEPHONE (OUTPATIENT)
Dept: FAMILY MEDICINE | Facility: CLINIC | Age: 75
End: 2025-02-24
Payer: MEDICARE

## 2025-02-24 NOTE — TELEPHONE ENCOUNTER
Pt phoned office to state that she is still having ear pain; was seen by NP on 2/19/25. Would like to come back into office for follow up and re-evaluation.  Pt inquiring on possible ENT referral.  Please advise...

## 2025-02-25 ENCOUNTER — INFUSION (OUTPATIENT)
Dept: INFUSION THERAPY | Facility: HOSPITAL | Age: 75
End: 2025-02-25
Attending: NURSE PRACTITIONER
Payer: MEDICARE

## 2025-02-25 VITALS
DIASTOLIC BLOOD PRESSURE: 78 MMHG | WEIGHT: 154.75 LBS | OXYGEN SATURATION: 94 % | BODY MASS INDEX: 26.57 KG/M2 | TEMPERATURE: 98 F | SYSTOLIC BLOOD PRESSURE: 124 MMHG | HEART RATE: 98 BPM

## 2025-02-25 DIAGNOSIS — Z17.0 MALIGNANT NEOPLASM OF CENTRAL PORTION OF RIGHT BREAST IN FEMALE, ESTROGEN RECEPTOR POSITIVE: ICD-10-CM

## 2025-02-25 DIAGNOSIS — M81.0 AGE-RELATED OSTEOPOROSIS WITHOUT CURRENT PATHOLOGICAL FRACTURE: Primary | ICD-10-CM

## 2025-02-25 DIAGNOSIS — C50.111 MALIGNANT NEOPLASM OF CENTRAL PORTION OF RIGHT BREAST IN FEMALE, ESTROGEN RECEPTOR POSITIVE: ICD-10-CM

## 2025-02-25 PROCEDURE — 63600175 PHARM REV CODE 636 W HCPCS: Performed by: NURSE PRACTITIONER

## 2025-02-25 PROCEDURE — 96523 IRRIG DRUG DELIVERY DEVICE: CPT

## 2025-02-25 RX ORDER — HEPARIN 100 UNIT/ML
500 SYRINGE INTRAVENOUS
Status: DISCONTINUED | OUTPATIENT
Start: 2025-02-25 | End: 2025-02-25 | Stop reason: HOSPADM

## 2025-02-25 RX ORDER — HEPARIN 100 UNIT/ML
500 SYRINGE INTRAVENOUS
OUTPATIENT
Start: 2025-02-25

## 2025-02-25 RX ORDER — SODIUM CHLORIDE 0.9 % (FLUSH) 0.9 %
10 SYRINGE (ML) INJECTION
Status: DISCONTINUED | OUTPATIENT
Start: 2025-02-25 | End: 2025-02-25 | Stop reason: HOSPADM

## 2025-02-25 RX ORDER — SODIUM CHLORIDE 0.9 % (FLUSH) 0.9 %
10 SYRINGE (ML) INJECTION
OUTPATIENT
Start: 2025-02-25

## 2025-02-25 RX ADMIN — HEPARIN 500 UNITS: 100 SYRINGE at 11:02

## 2025-02-27 NOTE — TELEPHONE ENCOUNTER
3rd attempt  Phoned pt. No answer. Unable to leave VM, mailbox is full. Waiting on call back from pt.

## 2025-03-03 ENCOUNTER — OFFICE VISIT (OUTPATIENT)
Dept: FAMILY MEDICINE | Facility: CLINIC | Age: 75
End: 2025-03-03
Payer: MEDICARE

## 2025-03-03 ENCOUNTER — RESULTS FOLLOW-UP (OUTPATIENT)
Dept: FAMILY MEDICINE | Facility: CLINIC | Age: 75
End: 2025-03-03

## 2025-03-03 ENCOUNTER — HOSPITAL ENCOUNTER (OUTPATIENT)
Dept: RADIOLOGY | Facility: HOSPITAL | Age: 75
Discharge: HOME | End: 2025-03-03
Payer: MEDICARE

## 2025-03-03 VITALS
SYSTOLIC BLOOD PRESSURE: 136 MMHG | HEIGHT: 64 IN | DIASTOLIC BLOOD PRESSURE: 80 MMHG | HEART RATE: 84 BPM | RESPIRATION RATE: 16 BRPM | TEMPERATURE: 98 F | WEIGHT: 154 LBS | OXYGEN SATURATION: 9 % | BODY MASS INDEX: 26.29 KG/M2

## 2025-03-03 DIAGNOSIS — R22.42 SKIN LUMP OF LEG, LEFT: Primary | ICD-10-CM

## 2025-03-03 DIAGNOSIS — M79.605 LEFT LEG PAIN: ICD-10-CM

## 2025-03-03 PROCEDURE — 73552 X-RAY EXAM OF FEMUR 2/>: CPT | Mod: TC,LT

## 2025-03-03 PROCEDURE — 1159F MED LIST DOCD IN RCRD: CPT | Mod: CPTII,,,

## 2025-03-03 PROCEDURE — 96372 THER/PROPH/DIAG INJ SC/IM: CPT | Mod: ,,,

## 2025-03-03 PROCEDURE — 73502 X-RAY EXAM HIP UNI 2-3 VIEWS: CPT | Mod: TC,LT

## 2025-03-03 PROCEDURE — 3008F BODY MASS INDEX DOCD: CPT | Mod: CPTII,,,

## 2025-03-03 PROCEDURE — 3075F SYST BP GE 130 - 139MM HG: CPT | Mod: CPTII,,,

## 2025-03-03 PROCEDURE — 1160F RVW MEDS BY RX/DR IN RCRD: CPT | Mod: CPTII,,,

## 2025-03-03 PROCEDURE — 1101F PT FALLS ASSESS-DOCD LE1/YR: CPT | Mod: CPTII,,,

## 2025-03-03 PROCEDURE — 99214 OFFICE O/P EST MOD 30 MIN: CPT | Mod: 25,,,

## 2025-03-03 PROCEDURE — 3079F DIAST BP 80-89 MM HG: CPT | Mod: CPTII,,,

## 2025-03-03 PROCEDURE — 3288F FALL RISK ASSESSMENT DOCD: CPT | Mod: CPTII,,,

## 2025-03-03 PROCEDURE — 3044F HG A1C LEVEL LT 7.0%: CPT | Mod: CPTII,,,

## 2025-03-03 RX ORDER — KETOROLAC TROMETHAMINE 30 MG/ML
60 INJECTION, SOLUTION INTRAMUSCULAR; INTRAVENOUS ONCE
Status: COMPLETED | OUTPATIENT
Start: 2025-03-03 | End: 2025-03-03

## 2025-03-03 RX ORDER — KETOROLAC TROMETHAMINE 10 MG/1
10 TABLET, FILM COATED ORAL EVERY 6 HOURS
Qty: 20 TABLET | Refills: 0 | Status: SHIPPED | OUTPATIENT
Start: 2025-03-03 | End: 2025-03-08

## 2025-03-03 RX ORDER — KETOROLAC TROMETHAMINE 30 MG/ML
60 INJECTION, SOLUTION INTRAMUSCULAR; INTRAVENOUS ONCE
Status: DISCONTINUED | OUTPATIENT
Start: 2025-03-03 | End: 2025-03-03

## 2025-03-03 RX ADMIN — KETOROLAC TROMETHAMINE 60 MG: 30 INJECTION, SOLUTION INTRAMUSCULAR; INTRAVENOUS at 10:03

## 2025-03-03 NOTE — ASSESSMENT & PLAN NOTE
Will order xray of left hip and femur to r/o fracture or dislocation. If negative, she can do an ultrasound of her left leg for further evaluation, we will call her with results. ED precautions-any falls, severe pain, numbness and weakness.

## 2025-03-03 NOTE — PROGRESS NOTES
"    Family Medicine  Carla Lucas, FNP-C    Patient ID: [id number]     Chief Complaint: Leg Pain (Lump on outter left thigh, pain radiating down to left leg and also radiates up to hip)      HPI:     Patient presents to the clinic for c/o pain and a lump on her left leg. She denies any kind of trauma or injury but did step funny into a hole in the yard on Thursday or Friday and she felt like it jolted her, she is concerned for her hip or leg being "out of place" or broken, she has a history of osteoporosis and cancer. She states she spent the weekend in the bed. Walking makes it worse, pain goes up and down her leg and into her hip. She has taken some tylenol for the pain with some relief. No bruising noted. She rates it 9/10 at its worse. She desribes it as a throbbing and aching pain. Denies falls, numbness or tingling.      she has diabetes.  She reports compliance with her medications.  Her last A1c was 6.8 2025. Her last foot exam was 2024.  Her ophthalmologist is Dr. denise who told her that she had the beginnings of macular degeneration and was referred to Dr. Vick.   She was previously on metformin but this was stopped after her labs of 2021 showed elevated creatinine.  This was replaced with glipizide but made her nauseous so she stopped. she is currently on ozempic 1mg weekly.      She has hypertension and hyperlipidemia.  Her cardiologist is Dr. Daigle.  On zetia and statin.  Had open heart surgery in 10/2023.      She has hypothyroidism and is on Synthroid name brand.  Her calcium has been elevated in the past. Not taking calcium supplement. Sometimes takes tums. Pth, vitamin d and ionized calcium 3/2023 were wnl      Went to [facility] ER 3/2/23 for vomiting. Got IV fluids.   Cmp there calcium was 10.2.   had ct abd done as well which also reported emphysematous changed at lung bases.  Denies sob. Never smoker.  Brother- lung cancer (dx 69). Another brother-  from lung disease (not cancer) at " "age 78. Dad had asbestosis. Mom dx age 65 from "oat" cell carcinoma.  Mom was smoker. No other relatives smoked. Cxr 3/2023 showed hyperinflation. pulmonology- dr. Yanes in .         She has acid reflux and reports compliance with her PPI and H2 blocker.  Previously she saw Dr. Tavera and is requesting to establish with a new Gastro, Dr. Peng.  Her last colonoscopy was with Dr. tavera 1/16/15  Which showed external hemorrhoids and sigmoid diverticulosis      She has anxiety and depression and followed with Psychiatry, Dr. Aguirre.  She is on Wellbutrin.  She is filling this from our office. She is not on controlled meds.      She has ckd.  Seeing dr. Robison.       she has a history of breast cancer and had a right mastectomy.  She is followed by Dr. Tsang.  She was on tamoxifen.  She wears a prosthetic. mammogram 7/2022 was normal. breast mri 5/2023 wnl. She has had a complete hysterectomy at age 37 her heavy menses and therefore no longer does Pap smears     She has osteoporosis and was on Prolia.  She takes vitamin-D.  does not take calcium due to elevated calcium.  Her last bone density test was 8/2023     she has a personal history of skin cancer.  Her dermatologist is Dr. Trujillo.      She is allergic to sulfa, hydrocodone, meperidine and morphine. She declines immunizations today     She had left cataract surgery with dr. Zapien 2/2023       Leg Pain   The incident occurred 3 to 5 days ago. The incident occurred at home. The injury mechanism is unknown. The pain is present in the left leg. The quality of the pain is described as aching. The pain is at a severity of 9/10. The pain is moderate. The pain has been Intermittent since onset. Associated symptoms include an inability to bear weight. The symptoms are aggravated by movement and palpation. She has tried acetaminophen for the symptoms. The treatment provided mild relief.       Past Medical History:   Diagnosis Date    Anxiety     CAD " (coronary artery disease)     Callus of foot 2024    Chronic cystitis 10/26/2022    Compression fracture of T12 vertebra 2024    Coronary artery disease involving native coronary artery of native heart with unstable angina pectoris 10/11/2023    Depressive disorder 2022    Formatting of this note might be different from the original.   Last Assessment & Plan:     Formatting of this note might be different from the original.    Stable on current prescription meds. Keep appointments with psych      Diabetes mellitus     Fibromyalgia     Gastric ulcer     Gastroesophageal reflux disease 2018    Formatting of this note might be different from the original.   Last Assessment & Plan:     Formatting of this note might be different from the original.    On PPI and H2 blocker.  Needs to establish with new GI since dr. tavera retired.      Genital herpes simplex 2022    Gout 2023    History of breast cancer 2022    History of skin cancer 2022    Hypercalcemia 2023    Hyperlipidemia associated with type 2 diabetes mellitus 2022    Hypothyroidism     Malignant neoplasm of central portion of right breast in female, estrogen receptor positive 2022    Nephrolithiasis     NSTEMI (non-ST elevated myocardial infarction)     Onychomycosis     Osteoporosis     Other emphysema 2023    Paroxysmal atrial fibrillation     Postmenopausal 2022    Stage 3b chronic kidney disease         Past Surgical History:   Procedure Laterality Date    APPENDECTOMY      BREAST SURGERY  2015    Breast cancer     SECTION  1975    Birth Boy    CHOLECYSTECTOMY  2007    COLONOSCOPY  2015    Rakesh Henriquez MD    CORONARY ARTERY BYPASS GRAFT  10/20/2023    coronary stents  10/08/2023    COSMETIC SURGERY  2011    Cancer in face    EYE SURGERY  2023    Cateract    HERNIA REPAIR      HYSTERECTOMY  1987    MASTECTOMY Right     PHACOEMULSIFICATION, CATARACT,  WITH IOL INSERTION Left 02/28/2023    Procedure: PHACOEMULSIFICATION, CATARACT, WITH IOL INSERTION- OS;  Surgeon: Stefanie Zapien MD;  Location: [facility];  Service: Ophthalmology;  Laterality: Left;    PLACEMENT-STENT      rt kidney stent      TUBAL LIGATION      UPPER GASTROINTESTINAL ENDOSCOPY  07/12/2022        Social History     Tobacco Use    Smoking status: Never     Passive exposure: Never    Smokeless tobacco: Never   Substance and Sexual Activity    Alcohol use: Never    Drug use: Not Currently    Sexual activity: Not Currently     Partners: Male     Birth control/protection: None        Current Outpatient Medications   Medication Instructions    atorvastatin (LIPITOR) 40 mg, Nightly    buPROPion (WELLBUTRIN SR) 100 mg, Oral, 2 times daily    cetirizine (ZYRTEC) 10 mg    ciprofloxacin-dexAMETHasone 0.3-0.1% (CIPRODEX) 0.3-0.1 % DrpS 4 drops, Both Ears, 2 times daily    cyanocobalamin, vitamin B-12, 1,000 mcg Subl Daily    doxazosin (CARDURA) 1 mg    ezetimibe (ZETIA) 10 mg, Oral, Nightly    flash glucose scanning reader Misc 1 each, Misc.(Non-Drug; Combo Route), Daily    FREESTYLE PAULO 14 DAY SENSOR Kit USE TO MONITOR BLOOD SUGAR (DX E11.9)    ketorolac (TORADOL) 10 mg, Oral, Every 6 hours, Start on 3/4/2025.    levothyroxine (SYNTHROID) 75 mcg, Oral, Every morning, BRAND NAME NECESSARY    methocarbamoL (ROBAXIN) 750 mg, 3 times daily    mupirocin (BACTROBAN) 2 % ointment APPLY TOPICALLY 3 TIMES A  DAY    OZEMPIC 1 mg, Subcutaneous, Every 7 days    valACYclovir (VALTREX) 1000 MG tablet As needed (PRN)       Review of patient's allergies indicates:   Allergen Reactions    Hydrocodone bitartrate      Other reaction(s): GI Intolerance  Other reaction(s): GI Intolerance  Acetaminophen hydrocodone bitartrate  Acetaminophen hydrocodone bitartrate      Sulfamethoxazole-trimethoprim      Other reaction(s): stomach pain  Other reaction(s): stomach pain      Hydrocodone-acetaminophen Nausea And Vomiting     Other  "reaction(s): Visual hallucinations  Other reaction(s): Visual hallucinations      Meperidine Nausea And Vomiting     Other reaction(s): GI Intolerance, Visual hallucinations  Other reaction(s): GI Intolerance, Visual hallucinations    Other reaction(s): Vomitting    Morphine Palpitations     Other reaction(s): chest tightness  Other reaction(s): chest tightness          Patient Care Team:  Jany Magana MD as PCP - General (Family Medicine)  Mino Vick Jr., MD as Consulting Physician (Ophthalmology)  Marlon Reich II, MD as Consulting Physician (Ophthalmology)  Corey Daigle MD as Consulting Physician (Cardiology)  Jasmyne Tsang MD as Consulting Physician (Oncology)  Gordon Trujillo MD (Dermatology)  Stefanie Zapien MD as Consulting Physician (Ophthalmology)  Jagdeep Gamboa MD as Consulting Physician (Urology)  Chino Yanes MD as Consulting Physician (Pulmonary Disease)  Racheal Robison MD as Consulting Physician (Nephrology)  Demetrio Hernandez MD (Emergency Medicine)  Enrico Peng MD as Consulting Physician (Gastroenterology)     Subjective:     Review of Systems   Constitutional: Negative.    HENT: Negative.     Eyes: Negative.    Respiratory: Negative.     Cardiovascular: Negative.    Gastrointestinal: Negative.    Endocrine: Negative.    Genitourinary: Negative.    Musculoskeletal:  Positive for arthralgias and gait problem. Negative for myalgias.   Skin: Negative.    Allergic/Immunologic: Negative.    Hematological: Negative.    Psychiatric/Behavioral: Negative.             Objective:     Visit Vitals  /80 (BP Location: Right arm, Patient Position: Sitting)   Pulse 84   Temp 97.9 °F (36.6 °C) (Oral)   Resp 16   Ht 5' 4" (1.626 m)   Wt 69.9 kg (154 lb)   SpO2 (!) 9%   BMI 26.43 kg/m²       Physical Exam  Vitals and nursing note reviewed.   Constitutional:       General: She is not in acute distress.     Appearance: Normal appearance. She is not ill-appearing. "   HENT:      Head: Normocephalic and atraumatic.      Mouth/Throat:      Mouth: Mucous membranes are moist.   Eyes:      Pupils: Pupils are equal, round, and reactive to light.   Cardiovascular:      Rate and Rhythm: Normal rate and regular rhythm.      Heart sounds: No murmur heard.     No friction rub. No gallop.   Pulmonary:      Effort: Pulmonary effort is normal. No respiratory distress.      Breath sounds: Normal breath sounds. No wheezing, rhonchi or rales.   Abdominal:      General: Abdomen is flat.      Palpations: Abdomen is soft.   Musculoskeletal:      Left lower leg: Tenderness present.        Legs:       Comments: Tenderness and hard lump noted on lateral left leg. No bruising.    Skin:     General: Skin is warm and dry.   Neurological:      General: No focal deficit present.      Mental Status: She is alert.   Psychiatric:         Mood and Affect: Mood normal.         Labs Reviewed:     Chemistry:  Lab Results   Component Value Date     02/06/2025    K 4.3 02/06/2025    BUN 20.3 (H) 02/06/2025    CREATININE 1.16 (H) 02/06/2025    EGFRNORACEVR 50 02/06/2025    GLUCOSE 134 (H) 02/06/2025    CALCIUM 9.4 02/06/2025    ALKPHOS 90 02/06/2025    LABPROT 7.0 02/06/2025    ALBUMIN 3.6 02/06/2025    BILIDIR 0.2 12/28/2021    IBILI 0.40 12/28/2021    AST 16 02/06/2025    ALT 13 02/06/2025    PHOS 3.1 07/25/2023    [id number] 40.5 03/29/2023    TSH 0.945 02/06/2025    SMKDZH7RRHJ 1.02 10/10/2024        Lab Results   Component Value Date    HGBA1C 6.8 02/06/2025        Hematology:  Lab Results   Component Value Date    WBC 9.02 09/23/2024    HGB 12.3 09/23/2024    HCT 36.6 (L) 09/23/2024     09/23/2024       Lipid Panel:  Lab Results   Component Value Date    CHOL 140 10/10/2024    HDL 63 (H) 10/10/2024    LDL 53.00 10/10/2024    TRIG 121 10/10/2024    TOTALCHOLEST 2 10/10/2024        Urine:  Lab Results   Component Value Date    APPEARANCEUA Clear 02/10/2025    SGUA 1.016 02/10/2025    PROTEINUA  Negative 02/10/2025    KETONESUA Negative 02/10/2025    LEUKOCYTESUR 250 (A) 02/10/2025    RBCUA 6-10 (A) 02/10/2025    WBCUA 21-50 (A) 02/10/2025    BACTERIA Many (A) 02/10/2025    SQEPUA Trace 02/10/2025    CREATRANDUR 105.3 07/29/2024    PROTEINURINE 12.9 07/25/2023    UPROTCREA 0.1 07/25/2023        Assessment:       ICD-10-CM ICD-9-CM   1. Skin lump of leg, left  R22.42 782.2   2. Left leg pain  M79.605 729.5        Plan:     1. Skin lump of leg, left  Assessment & Plan:  Will order xray of left hip and femur to r/o fracture or dislocation. If negative, she can do an ultrasound of her left leg for further evaluation, we will call her with results. ED precautions-any falls, severe pain, numbness and weakness.    Orders:  -     US Extremity Non Vascular Complete Left; Future; Expected date: 03/03/2025  -     ketorolac injection 60 mg  -     ketorolac (TORADOL) 10 mg tablet; Take 1 tablet (10 mg total) by mouth every 6 (six) hours. Start on 3/4/2025. for 5 days  Dispense: 20 tablet; Refill: 0    2. Left leg pain  Assessment & Plan:  Will give toradol IM in clinic and prescription for some by mouth at home. Instructed her to start the by mouth on 3/4/2025 and avoid other NSAIDs while on.   Xray of left hip and femur to r/o fracture/dislocation, will call with results.  Avoid aggravating factors.      Orders:  -     X-Ray Hip 2 or 3 views Left with Pelvis when performed; Future; Expected date: 03/03/2025  -     X-Ray Femur 2 View Left; Future; Expected date: 03/03/2025         Follow up if symptoms worsen or fail to improve. In addition to their scheduled follow up, the patient has also been instructed to follow up on as needed basis.     Future Appointments   Date Time Provider Department Center   5/20/2025 10:30 AM INJECTION CHAIR 01, [facility]   8/1/2025 10:45 [facility]   8/12/2025 10:30 AM INJECTION CHAIR WILL Marie  CHEMOTHERAPY INFUSION [facility] CHEMO [facility]   8/20/2025 11:00 AM LAB, [facility] OLB LAB [facility]   8/27/2025 11:00 AM Jasmyne Tsang MD [facility] HEMONC [facility]   11/4/2025 10:30 AM INJECTION CHAIR 02, [facility] CHEMOTHERAPY INFUSION [facility] CHEMO [facility]        MARC Grier

## 2025-03-03 NOTE — ASSESSMENT & PLAN NOTE
Will give toradol IM in clinic and prescription for some by mouth at home. Instructed her to start the by mouth on 3/4/2025 and avoid other NSAIDs while on.   Xray of left hip and femur to r/o fracture/dislocation, will call with results.  Avoid aggravating factors.

## 2025-03-06 NOTE — TELEPHONE ENCOUNTER
----- Message from MARC Grier sent at 3/3/2025 12:14 PM CST -----  Please inform patient of results.    1. Hip Xray shows no fractures or dislocations.     All other findings within acceptable limits.  ----- Message -----  From: Interface, Rad Results In  Sent: 3/3/2025  12:09 PM CST  To: MARC Grire

## 2025-05-08 ENCOUNTER — TELEPHONE (OUTPATIENT)
Dept: FAMILY MEDICINE | Facility: CLINIC | Age: 75
End: 2025-05-08
Payer: MEDICARE

## 2025-05-08 DIAGNOSIS — F32.A DEPRESSIVE DISORDER: ICD-10-CM

## 2025-05-08 DIAGNOSIS — F41.9 ANXIETY: ICD-10-CM

## 2025-05-08 RX ORDER — BUPROPION HYDROCHLORIDE 100 MG/1
100 TABLET, EXTENDED RELEASE ORAL 2 TIMES DAILY
Qty: 180 TABLET | Refills: 3 | Status: SHIPPED | OUTPATIENT
Start: 2025-05-08 | End: 2025-05-09 | Stop reason: SDUPTHER

## 2025-05-08 NOTE — TELEPHONE ENCOUNTER
Copied from CRM #[id number]. Topic: General Inquiry - Patient Update  >> May 8, 2025  1:43 PM Adriane wrote:  Who Called: Claire Ang    Refill or New Rx:Refill    RX Name and Strength:buPROPion (WELLBUTRIN SR) 200 MG TBSR 12 hr tablet  How is the patient currently taking it? (ex. 1XDay):  Is this a 30 day or 90 day RX:  Local or Mail Order:  List of preferred pharmacies on file (remove unneeded): [unfilled]  If different Pharmacy is requested, enter Pharmacy information here including location and phone number:       [facility]- [facility] - [phone]   Ordering Provider:    Preferred Method of Contact: Phone Call  Patient's Preferred Phone Number on File: [phone]   Best Call Back Number, if different:    Additional Information: REQ BRAND NAME-  pt is out of town forgot medication , please follow up if needed

## 2025-05-08 NOTE — TELEPHONE ENCOUNTER
Copied from CRM #[id number]. Topic: Medications - Pharmacy  >> May 8, 2025  3:30 PM Skyler wrote:  .Who Called: Tesha     Pharmacy is calling to request assistance with Rx    Pharmacy name and phone number: [place] [facility]  Pharmacy contact: tesha pharmacist   Patient Name: lex maya   Prescription Name:  buPROPion (WELLBUTRIN SR) 100 MG TBSR 12 hr tablet  What do they need to clarify?:needs the okay for the generic. States that the name brand cost $1,200        Preferred Method of Contact: Phone Call  Patient's Preferred Phone Number on File: [phone]  Best Call Back Number, if different:  Additional Information:

## 2025-05-09 ENCOUNTER — TELEPHONE (OUTPATIENT)
Dept: FAMILY MEDICINE | Facility: CLINIC | Age: 75
End: 2025-05-09
Payer: MEDICARE

## 2025-05-09 DIAGNOSIS — F41.9 ANXIETY: ICD-10-CM

## 2025-05-09 DIAGNOSIS — F32.A DEPRESSIVE DISORDER: ICD-10-CM

## 2025-05-09 RX ORDER — BUPROPION HYDROCHLORIDE 100 MG/1
100 TABLET, EXTENDED RELEASE ORAL 2 TIMES DAILY
Qty: 10 TABLET | Refills: 0 | Status: SHIPPED | OUTPATIENT
Start: 2025-05-09 | End: 2026-05-09

## 2025-05-09 NOTE — TELEPHONE ENCOUNTER
Copied from CRM #[id number]. Topic: Medications - New Medication Request  >> May 9, 2025  9:59 AM Memo wrote:  Who Called: Claire Ang    Caller is requesting assistance/information from provider's office.    Symptoms (please be specific):    How long has patient had these symptoms:    List of preferred pharmacies on file (remove unneeded): [facility]  If different, enter pharmacy into here including location and phone number:       Preferred Method of Contact: Phone Call  Patient's Preferred Phone Number on File: [phone]   Best Call Back Number, if different:  Additional Information: Patient is requesting a call if she can get the Propion called in states it's a generic brand.

## 2025-05-09 NOTE — TELEPHONE ENCOUNTER
I spoke with patient and she needs 5 day supply of generic bupropion. Rx sent and patient advised. Alessandro

## 2025-05-22 ENCOUNTER — INFUSION (OUTPATIENT)
Dept: INFUSION THERAPY | Facility: HOSPITAL | Age: 75
End: 2025-05-22
Attending: NURSE PRACTITIONER
Payer: MEDICARE

## 2025-05-22 VITALS
SYSTOLIC BLOOD PRESSURE: 117 MMHG | TEMPERATURE: 98 F | HEART RATE: 81 BPM | DIASTOLIC BLOOD PRESSURE: 72 MMHG | RESPIRATION RATE: 18 BRPM | OXYGEN SATURATION: 97 %

## 2025-05-22 DIAGNOSIS — Z17.0 MALIGNANT NEOPLASM OF CENTRAL PORTION OF RIGHT BREAST IN FEMALE, ESTROGEN RECEPTOR POSITIVE: ICD-10-CM

## 2025-05-22 DIAGNOSIS — M81.0 AGE-RELATED OSTEOPOROSIS WITHOUT CURRENT PATHOLOGICAL FRACTURE: Primary | ICD-10-CM

## 2025-05-22 DIAGNOSIS — C50.111 MALIGNANT NEOPLASM OF CENTRAL PORTION OF RIGHT BREAST IN FEMALE, ESTROGEN RECEPTOR POSITIVE: ICD-10-CM

## 2025-05-22 PROCEDURE — 25000003 PHARM REV CODE 250: Performed by: NURSE PRACTITIONER

## 2025-05-22 PROCEDURE — 96523 IRRIG DRUG DELIVERY DEVICE: CPT

## 2025-05-22 PROCEDURE — 63600175 PHARM REV CODE 636 W HCPCS: Performed by: NURSE PRACTITIONER

## 2025-05-22 PROCEDURE — A4216 STERILE WATER/SALINE, 10 ML: HCPCS | Performed by: NURSE PRACTITIONER

## 2025-05-22 RX ORDER — SODIUM CHLORIDE 0.9 % (FLUSH) 0.9 %
10 SYRINGE (ML) INJECTION
OUTPATIENT
Start: 2025-05-22

## 2025-05-22 RX ORDER — HEPARIN 100 UNIT/ML
500 SYRINGE INTRAVENOUS
Status: DISCONTINUED | OUTPATIENT
Start: 2025-05-22 | End: 2025-05-22 | Stop reason: HOSPADM

## 2025-05-22 RX ORDER — HEPARIN 100 UNIT/ML
500 SYRINGE INTRAVENOUS
OUTPATIENT
Start: 2025-05-22

## 2025-05-22 RX ORDER — SODIUM CHLORIDE 0.9 % (FLUSH) 0.9 %
10 SYRINGE (ML) INJECTION
Status: DISCONTINUED | OUTPATIENT
Start: 2025-05-22 | End: 2025-05-22 | Stop reason: HOSPADM

## 2025-05-22 RX ADMIN — Medication 10 ML: at 10:05

## 2025-07-02 DIAGNOSIS — N18.32 TYPE 2 DIABETES MELLITUS WITH STAGE 3B CHRONIC KIDNEY DISEASE, WITHOUT LONG-TERM CURRENT USE OF INSULIN: ICD-10-CM

## 2025-07-02 DIAGNOSIS — E11.22 TYPE 2 DIABETES MELLITUS WITH STAGE 3B CHRONIC KIDNEY DISEASE, WITHOUT LONG-TERM CURRENT USE OF INSULIN: ICD-10-CM

## 2025-07-02 RX ORDER — FLASH GLUCOSE SENSOR
KIT MISCELLANEOUS
Qty: 2 KIT | Refills: 99 | Status: SHIPPED | OUTPATIENT
Start: 2025-07-02

## 2025-07-02 RX ORDER — SEMAGLUTIDE 1.34 MG/ML
1 INJECTION, SOLUTION SUBCUTANEOUS
Qty: 9 ML | Refills: 0 | Status: SHIPPED | OUTPATIENT
Start: 2025-07-02 | End: 2026-07-02

## 2025-07-02 RX ORDER — PANTOPRAZOLE SODIUM 40 MG/1
40 TABLET, DELAYED RELEASE ORAL DAILY
Qty: 90 TABLET | Refills: 2 | Status: SHIPPED | OUTPATIENT
Start: 2025-07-02

## 2025-07-02 RX ORDER — PANTOPRAZOLE SODIUM 40 MG/1
40 TABLET, DELAYED RELEASE ORAL
COMMUNITY
Start: 2025-05-25 | End: 2025-07-02 | Stop reason: SDUPTHER

## 2025-07-02 NOTE — TELEPHONE ENCOUNTER
Copied from CRM #[id number]. Topic: Medications - Medication Refill  >> Jul 2, 2025  4:01 PM Richar wrote:  .Who Called: Claire S Ashia    Refill or New Rx:Refill  RX Name and Strength: FREESTYLE PAULO 14 DAY SENSOR Kit  How is the patient currently taking it? (ex. 1XDay):na  Is this a 30 day or 90 day RX:na  Local or Mail Order:[facility]  List of preferred pharmacies on file (remove unneeded): [facility] AT [facility] [facility]      If different Pharmacy is requested, enter Pharmacy information here including location and phone number: na   Ordering Provider:PCP      Preferred Method of Contact: Phone Call  Patient's Preferred Phone Number on File: [phone]   Best Call Back Number, if different:  Additional Information:     .Who Called: Claire BEARD Ashia    Refill or New Rx:Refill  RX Name and Strength:semaglutide (OZEMPIC) 1 mg/dose (4 mg/3 mL)  How is the patient currently taking it? (ex. 1XDay):na  Is this a 30 day or 90 day RX:9ml  Local or Mail Order:[facility]  List of preferred pharmacies on file (remove unneeded): [facility] [address] [facility]    If different Pharmacy is requested, enter Pharmacy information here including location and phone number: NA   Ordering Provider:PCP      Preferred Method of Contact: Phone Call  Patient's Preferred Phone Number on File: [phone]   Best Call Back Number, if different:  Additional Information:     .Who Called: Claire BEARD Ashia    Refill or New Rx:Refill  RX Name and Strength:pantoprazole (PROTONIX) 40 MG tablet    How is the patient currently taking it? (ex. 1XDay):1xday  Is this a 30 day or 90 day RX:90  Local or Mail Order:Mail  List of preferred pharmacies on file (remove unneeded): [facility] AT [facility]  SIT[facility]    If different Pharmacy is requested, enter Pharmacy information here including location and phone number: na   Ordering Provider:PCP      Preferred Method of Contact: Phone Call  Patient's Preferred Phone Number on File: [phone]   Best Call Back Number, if different:  Additional Information:

## 2025-07-07 ENCOUNTER — TELEPHONE (OUTPATIENT)
Dept: FAMILY MEDICINE | Facility: CLINIC | Age: 75
End: 2025-07-07

## 2025-07-07 ENCOUNTER — RESULTS FOLLOW-UP (OUTPATIENT)
Dept: FAMILY MEDICINE | Facility: CLINIC | Age: 75
End: 2025-07-07

## 2025-07-07 ENCOUNTER — APPOINTMENT (OUTPATIENT)
Dept: LAB | Facility: HOSPITAL | Age: 75
End: 2025-07-07
Payer: MEDICARE

## 2025-07-07 ENCOUNTER — OFFICE VISIT (OUTPATIENT)
Dept: FAMILY MEDICINE | Facility: CLINIC | Age: 75
End: 2025-07-07
Payer: MEDICARE

## 2025-07-07 VITALS
HEIGHT: 64 IN | SYSTOLIC BLOOD PRESSURE: 136 MMHG | RESPIRATION RATE: 16 BRPM | HEART RATE: 69 BPM | BODY MASS INDEX: 26.02 KG/M2 | OXYGEN SATURATION: 99 % | DIASTOLIC BLOOD PRESSURE: 70 MMHG | TEMPERATURE: 97 F | WEIGHT: 152.38 LBS

## 2025-07-07 DIAGNOSIS — R53.83 FATIGUE, UNSPECIFIED TYPE: ICD-10-CM

## 2025-07-07 DIAGNOSIS — Z00.00 WELLNESS EXAMINATION: Primary | ICD-10-CM

## 2025-07-07 DIAGNOSIS — R39.9 UTI SYMPTOMS: Primary | ICD-10-CM

## 2025-07-07 LAB
ANION GAP SERPL CALC-SCNC: 8 MEQ/L
BASOPHILS # BLD AUTO: 0.05 X10(3)/MCL
BASOPHILS NFR BLD AUTO: 0.6 %
BUN SERPL-MCNC: 26.2 MG/DL (ref 9.8–20.1)
CALCIUM SERPL-MCNC: 9.8 MG/DL (ref 8.4–10.2)
CHLORIDE SERPL-SCNC: 101 MMOL/L (ref 98–107)
CO2 SERPL-SCNC: 29 MMOL/L (ref 23–31)
CREAT SERPL-MCNC: 1.15 MG/DL (ref 0.55–1.02)
CREAT/UREA NIT SERPL: 23
EOSINOPHIL # BLD AUTO: 0.2 X10(3)/MCL (ref 0–0.9)
EOSINOPHIL NFR BLD AUTO: 2.4 %
ERYTHROCYTE [DISTWIDTH] IN BLOOD BY AUTOMATED COUNT: 13 % (ref 11.5–17)
GFR SERPLBLD CREATININE-BSD FMLA CKD-EPI: 50 ML/MIN/1.73/M2
GLUCOSE SERPL-MCNC: 142 MG/DL (ref 82–115)
HCT VFR BLD AUTO: 39.7 % (ref 37–47)
HGB BLD-MCNC: 13.1 G/DL (ref 12–16)
IMM GRANULOCYTES # BLD AUTO: 0.02 X10(3)/MCL (ref 0–0.04)
IMM GRANULOCYTES NFR BLD AUTO: 0.2 %
LYMPHOCYTES # BLD AUTO: 2.22 X10(3)/MCL (ref 0.6–4.6)
LYMPHOCYTES NFR BLD AUTO: 27.1 %
MCH RBC QN AUTO: 32.3 PG (ref 27–31)
MCHC RBC AUTO-ENTMCNC: 33 G/DL (ref 33–36)
MCV RBC AUTO: 97.8 FL (ref 80–94)
MONOCYTES # BLD AUTO: 0.58 X10(3)/MCL (ref 0.1–1.3)
MONOCYTES NFR BLD AUTO: 7.1 %
NEUTROPHILS # BLD AUTO: 5.12 X10(3)/MCL (ref 2.1–9.2)
NEUTROPHILS NFR BLD AUTO: 62.6 %
NRBC BLD AUTO-RTO: 0 %
PLATELET # BLD AUTO: 186 X10(3)/MCL (ref 130–400)
PMV BLD AUTO: 9.2 FL (ref 7.4–10.4)
POTASSIUM SERPL-SCNC: 4.5 MMOL/L (ref 3.5–5.1)
RBC # BLD AUTO: 4.06 X10(6)/MCL (ref 4.2–5.4)
SODIUM SERPL-SCNC: 138 MMOL/L (ref 136–145)
WBC # BLD AUTO: 8.19 X10(3)/MCL (ref 4.5–11.5)

## 2025-07-07 PROCEDURE — 1160F RVW MEDS BY RX/DR IN RCRD: CPT | Mod: CPTII,,,

## 2025-07-07 PROCEDURE — 3078F DIAST BP <80 MM HG: CPT | Mod: CPTII,,,

## 2025-07-07 PROCEDURE — 1159F MED LIST DOCD IN RCRD: CPT | Mod: CPTII,,,

## 2025-07-07 PROCEDURE — 3288F FALL RISK ASSESSMENT DOCD: CPT | Mod: CPTII,,,

## 2025-07-07 PROCEDURE — 1125F AMNT PAIN NOTED PAIN PRSNT: CPT | Mod: CPTII,,,

## 2025-07-07 PROCEDURE — 36415 COLL VENOUS BLD VENIPUNCTURE: CPT

## 2025-07-07 PROCEDURE — 3008F BODY MASS INDEX DOCD: CPT | Mod: CPTII,,,

## 2025-07-07 PROCEDURE — 1101F PT FALLS ASSESS-DOCD LE1/YR: CPT | Mod: CPTII,,,

## 2025-07-07 PROCEDURE — 99214 OFFICE O/P EST MOD 30 MIN: CPT | Mod: ,,,

## 2025-07-07 PROCEDURE — 3075F SYST BP GE 130 - 139MM HG: CPT | Mod: CPTII,,,

## 2025-07-07 PROCEDURE — 3044F HG A1C LEVEL LT 7.0%: CPT | Mod: CPTII,,,

## 2025-07-07 RX ORDER — FAMOTIDINE 20 MG/1
20 TABLET, FILM COATED ORAL 2 TIMES DAILY
COMMUNITY
Start: 2025-05-25

## 2025-07-07 RX ORDER — CIPROFLOXACIN 500 MG/1
500 TABLET, FILM COATED ORAL 2 TIMES DAILY
Qty: 14 TABLET | Refills: 0 | Status: SHIPPED | OUTPATIENT
Start: 2025-07-07 | End: 2025-07-09

## 2025-07-07 RX ORDER — LACTULOSE 10 G/15ML
30 SOLUTION ORAL; RECTAL
COMMUNITY
Start: 2025-06-26

## 2025-07-07 NOTE — ASSESSMENT & PLAN NOTE
Start Macrobid.  Complete full course of antibiotics even if your symptoms improve.  UA + C/S ordered.   Report any continuing symptoms after antibiotic completion such as nausea/vomiting, low back pain, blood in urine, burning with urination, or fever/body aches/chills.  Drink plenty of water daily. Avoid soda and caffeine.  Women wipe front to back, urinate after sexual intercourse, and avoid irritating feminine products.  Urinate frequently. Do not hold urine for extended periods of time.    Wear cotton underwear and avoid tight fitting pants.   Use OTC AZO for relief of urinary spasms.

## 2025-07-07 NOTE — PROGRESS NOTES
"  Family Medicine  Carla Lucas, [facility]    Patient ID: [id number]     Chief Complaint: Follow-up (Fatigue/Kidney Infection)      HPI:     Patient presents to the clinic unaccompanied for complaints of fatigue. She also states her urine has been dark and she is having some lower abdominal pain. She denies flank pain. She denies fever, body aches and chills. She admits to urinary frequency. She states she has been treated for pyelonephritis in the past. She states she could have stayed in bed all day on Saturday and . She denies sick contacts. She is wondering if she is anemic, will check some labs. She has been taking some zyrtec.     She has diabetes.  She reports compliance with her medications.  Her last A1c was 6.8 2025. Her last foot exam was 2024.  Her ophthalmologist is Dr. denise who told her that she had the beginnings of macular degeneration and was referred to Dr. Vick.   She was previously on metformin but this was stopped after her labs of 2021 showed elevated creatinine.  This was replaced with glipizide but made her nauseous so she stopped. she is currently on ozempic 1mg weekly.      She has hypertension and hyperlipidemia.  Her cardiologist is Dr. Daigle.  On zetia and statin.  Had open heart surgery in 10/2023.      She has hypothyroidism and is on Synthroid name brand.  Her calcium has been elevated in the past. Not taking calcium supplement. Sometimes takes tums. Pth, vitamin d and ionized calcium 3/2023 were wnl      Went to [facility] ER 3/2/23 for vomiting. Got IV fluids.   Cmp there calcium was 10.2.   had ct abd done as well which also reported emphysematous changed at lung bases.  Denies sob. Never smoker.  Brother- lung cancer (dx 69). Another brother-  from lung disease (not cancer) at age 78. Dad had asbestosis. Mom dx age 65 from "oat" cell carcinoma.  Mom was smoker. No other relatives smoked. Cxr 3/2023 showed hyperinflation. pulmonology- dr. Yanes in [place].         She " has acid reflux and reports compliance with her PPI and H2 blocker.  Previously she saw Dr. Tavera and is requesting to establish with a new Gastro, Dr. Peng.  Her last colonoscopy was with Dr. tavera 1/16/15  Which showed external hemorrhoids and sigmoid diverticulosis      She has anxiety and depression and followed with Psychiatry, Dr. Aguirre.  She is on Wellbutrin.  She is filling this from our office. She is not on controlled meds.      She has ckd.  Seeing dr. Robison.       she has a history of breast cancer and had a right mastectomy.  She is followed by Dr. Tsang.  She was on tamoxifen.  She wears a prosthetic. mammogram 7/2022 was normal. breast mri 5/2023 wnl. She has had a complete hysterectomy at age 37 her heavy menses and therefore no longer does Pap smears     She has osteoporosis and was on Prolia.  She takes vitamin-D.  does not take calcium due to elevated calcium.  Her last bone density test was 8/2023     she has a personal history of skin cancer.  Her dermatologist is Dr. Trujillo.      She is allergic to sulfa, hydrocodone, meperidine and morphine. She declines immunizations today     She had left cataract surgery with dr. Zapien 2/2023.          Past Medical History:   Diagnosis Date    Anxiety     CAD (coronary artery disease)     Callus of foot 07/31/2024    Chronic cystitis 10/26/2022    Compression fracture of T12 vertebra 04/11/2024    Coronary artery disease involving native coronary artery of native heart with unstable angina pectoris 10/11/2023    Depressive disorder 05/26/2022    Formatting of this note might be different from the original.   Last Assessment & Plan:     Formatting of this note might be different from the original.    Stable on current prescription meds. Keep appointments with psych      Diabetes mellitus     Fibromyalgia     Gastric ulcer     Gastroesophageal reflux disease 02/08/2018    Formatting of this note might be different from the original.   Last  Assessment & Plan:     Formatting of this note might be different from the original.    On PPI and H2 blocker.  Needs to establish with new GI since dr. tavera retired.      Genital herpes simplex 2022    Gout 2023    History of breast cancer 2022    History of skin cancer 2022    Hypercalcemia 2023    Hyperlipidemia associated with type 2 diabetes mellitus 2022    Hypothyroidism     Malignant neoplasm of central portion of right breast in female, estrogen receptor positive 2022    Nephrolithiasis     NSTEMI (non-ST elevated myocardial infarction)     Onychomycosis     Osteoporosis     Other emphysema 2023    Paroxysmal atrial fibrillation     Postmenopausal 2022    Stage 3b chronic kidney disease         Past Surgical History:   Procedure Laterality Date    APPENDECTOMY      BREAST SURGERY      Breast cancer     SECTION  1975    Birth Boy    CHOLECYSTECTOMY  2007    COLONOSCOPY  2015    Rakesh Henriquez MD    CORONARY ARTERY BYPASS GRAFT  10/20/2023    coronary stents  10/08/2023    COSMETIC SURGERY      Cancer in face    EYE SURGERY  2023    Cateract    HERNIA REPAIR  2019    HYSTERECTOMY  1987    MASTECTOMY Right     PHACOEMULSIFICATION, CATARACT, WITH IOL INSERTION Left 2023    Procedure: PHACOEMULSIFICATION, CATARACT, WITH IOL INSERTION- OS;  Surgeon: Stefanie Zapien MD;  Location: [facility] OR;  Service: Ophthalmology;  Laterality: Left;    PLACEMENT-STENT      rt kidney stent      TUBAL LIGATION      UPPER GASTROINTESTINAL ENDOSCOPY  2022        Social History     Tobacco Use    Smoking status: Never     Passive exposure: Never    Smokeless tobacco: Never   Substance and Sexual Activity    Alcohol use: Never    Drug use: Not Currently    Sexual activity: Not Currently     Partners: Male     Birth control/protection: None        Current Outpatient Medications   Medication Instructions    atorvastatin (LIPITOR) 40 mg,  Nightly    buPROPion (WELLBUTRIN SR) 100 mg, Oral, 2 times daily    cetirizine (ZYRTEC) 10 mg    ciprofloxacin HCl (CIPRO) 500 mg, Oral, 2 times daily    ciprofloxacin-dexAMETHasone 0.3-0.1% (CIPRODEX) 0.3-0.1 % DrpS 4 drops, Both Ears, 2 times daily    cyanocobalamin, vitamin B-12, 1,000 mcg Subl Daily    doxazosin (CARDURA) 1 mg    ezetimibe (ZETIA) 10 mg, Oral, Nightly    famotidine (PEPCID) 20 mg, 2 times daily    flash glucose scanning reader Misc 1 each, Misc.(Non-Drug; Combo Route), Daily    FREESTYLE PAULO 14 DAY SENSOR Kit USE TO MONITOR BLOOD SUGAR (DX E11.9)    lactulose (CHRONULAC) 10 gram/15 mL solution 30 mLs    levothyroxine (SYNTHROID) 75 mcg, Oral, Every morning, BRAND NAME NECESSARY    methocarbamoL (ROBAXIN) 750 mg, 3 times daily    mupirocin (BACTROBAN) 2 % ointment APPLY TOPICALLY 3 TIMES A  DAY    OZEMPIC 1 mg, Subcutaneous, Every 7 days    pantoprazole (PROTONIX) 40 mg, Oral, Daily    valACYclovir (VALTREX) 1000 MG tablet As needed (PRN)       Review of patient's allergies indicates:   Allergen Reactions    Hydrocodone bitartrate      Other reaction(s): GI Intolerance  Other reaction(s): GI Intolerance  Acetaminophen hydrocodone bitartrate  Acetaminophen hydrocodone bitartrate      Sulfamethoxazole-trimethoprim      Other reaction(s): stomach pain  Other reaction(s): stomach pain      Hydrocodone-acetaminophen Nausea And Vomiting     Other reaction(s): Visual hallucinations  Other reaction(s): Visual hallucinations      Meperidine Nausea And Vomiting     Other reaction(s): GI Intolerance, Visual hallucinations  Other reaction(s): GI Intolerance, Visual hallucinations    Other reaction(s): Vomitting    Morphine Palpitations     Other reaction(s): chest tightness  Other reaction(s): chest tightness          Patient Care Team:  Jany Magana MD as PCP - General (Family Medicine)  Mino Vick Jr., MD as Consulting Physician (Ophthalmology)  Marlon Reich II, MD as Consulting Physician  "(Ophthalmology)  Corey Daigle MD as Consulting Physician (Cardiology)  Jasmyne Tsang MD as Consulting Physician (Oncology)  Gordon Trujillo MD (Dermatology)  Stefanie Zapien MD as Consulting Physician (Ophthalmology)  Jagdeep Gamboa MD as Consulting Physician (Urology)  Chino Yanes MD as Consulting Physician (Pulmonary Disease)  Racheal Robison MD as Consulting Physician (Nephrology)  Demetrio Hernandez MD (Emergency Medicine)  Enrico Peng MD as Consulting Physician (Gastroenterology)     Subjective:     Review of Systems   Constitutional:  Positive for fatigue.   HENT: Negative.     Eyes: Negative.    Respiratory: Negative.     Cardiovascular: Negative.    Gastrointestinal: Negative.    Endocrine: Negative.    Genitourinary:  Positive for dysuria and frequency.   Musculoskeletal: Negative.  Negative for myalgias.   Skin: Negative.    Allergic/Immunologic: Negative.    Neurological: Negative.    Hematological: Negative.    Psychiatric/Behavioral: Negative.           Objective:     Visit Vitals  /70 (BP Location: Left arm, Patient Position: Sitting)   Pulse 69   Temp 97.4 °F (36.3 °C) (Oral)   Resp 16   Ht 5' 4" (1.626 m)   Wt 69.1 kg (152 lb 6.4 oz)   SpO2 99%   BMI 26.16 kg/m²       Physical Exam  Vitals and nursing note reviewed.   Constitutional:       General: She is not in acute distress.     Appearance: Normal appearance. She is not ill-appearing.   HENT:      Head: Normocephalic and atraumatic.      Mouth/Throat:      Mouth: Mucous membranes are moist.   Eyes:      Pupils: Pupils are equal, round, and reactive to light.   Cardiovascular:      Rate and Rhythm: Normal rate and regular rhythm.      Heart sounds: No murmur heard.     No friction rub. No gallop.   Pulmonary:      Effort: Pulmonary effort is normal. No respiratory distress.      Breath sounds: Normal breath sounds. No wheezing, rhonchi or rales.   Abdominal:      General: Abdomen is flat.      Palpations: " Abdomen is soft.   Skin:     General: Skin is warm and dry.   Neurological:      General: No focal deficit present.      Mental Status: She is alert and oriented to person, place, and time.   Psychiatric:         Mood and Affect: Mood normal.         Behavior: Behavior normal.         Labs Reviewed:     Chemistry:  Lab Results   Component Value Date     (L) 04/14/2025    K 3.8 04/14/2025    BUN 22 04/14/2025    CREATININE 1.04 (H) 04/14/2025    EGFRNORACEVR 50 02/06/2025    CALCIUM 9.2 04/14/2025    ALKPHOS 90 02/06/2025    LABPROT 12.9 04/13/2025    ALBUMIN 3.6 02/06/2025    BILIDIR 0.2 12/28/2021    IBILI 0.40 12/28/2021    AST 16 02/06/2025    ALT 13 02/06/2025    PHOS 3.1 07/25/2023    HNBUAMSD46VV 40.5 03/29/2023    TSH 0.945 02/06/2025    ZKKWOQ2BESE 1.02 10/10/2024        Lab Results   Component Value Date    HGBA1C 6.8 02/06/2025        Hematology:  Lab Results   Component Value Date    WBC 9.02 09/23/2024    HGB 12.3 09/23/2024    HCT 36.6 (L) 09/23/2024     09/23/2024       Lipid Panel:  Lab Results   Component Value Date    CHOL 140 10/10/2024    HDL 63 (H) 10/10/2024    LDL 53.00 10/10/2024    TRIG 121 10/10/2024    TOTALCHOLEST 2 10/10/2024        Urine:  Lab Results   Component Value Date    APPEARANCEUA Clear 02/10/2025    SGUA 1.016 02/10/2025    PROTEINUA Negative 02/10/2025    KETONESUA Negative 02/10/2025    LEUKOCYTESUR 250 (A) 02/10/2025    RBCUA 6-10 (A) 02/10/2025    WBCUA 21-50 (A) 02/10/2025    BACTERIA Many (A) 02/10/2025    SQEPUA Trace 02/10/2025    CREATRANDUR 105.3 07/29/2024    PROTEINURINE 12.9 07/25/2023    UPROTCREA 0.1 07/25/2023        Assessment:       ICD-10-CM ICD-9-CM   1. UTI symptoms  R39.9 788.99   2. Fatigue, unspecified type  R53.83 780.79        Plan:     1. UTI symptoms  Assessment & Plan:  Start Macrobid.  Complete full course of antibiotics even if your symptoms improve.  UA + C/S ordered.   Report any continuing symptoms after antibiotic completion such  as nausea/vomiting, low back pain, blood in urine, burning with urination, or fever/body aches/chills.  Drink plenty of water daily. Avoid soda and caffeine.  Women wipe front to back, urinate after sexual intercourse, and avoid irritating feminine products.  Urinate frequently. Do not hold urine for extended periods of time.    Wear cotton underwear and avoid tight fitting pants.   Use OTC AZO for relief of urinary spasms.     Orders:  -     Urinalysis ($); Future; Expected date: 07/07/2025  -     Urine Culture High Risk ($$); Future; Expected date: 07/07/2025  -     ciprofloxacin HCl (CIPRO) 500 MG tablet; Take 1 tablet (500 mg total) by mouth 2 (two) times daily. for 7 days  Dispense: 14 tablet; Refill: 0  -     CBC Auto Differential; Future; Expected date: 07/07/2025  -     Basic Metabolic Panel; Future; Expected date: 07/07/2025    2. Fatigue, unspecified type  Assessment & Plan:  Check CBC and BMP and follow up results. Contact clinic for concerns.            Follow up if symptoms worsen or fail to improve. In addition to their scheduled follow up, the patient has also been instructed to follow up on as needed basis.     Future Appointments   Date Time Provider Department Center   8/1/2025 10:45 AM Jany Magana MD [facility]   8/12/2025 10:30 AM INJECTION CHAIR 02, [facility] CHEMOTHERAPY INFUSION [facility] CHEMO [facility]   8/20/2025 11:00 AM LAB, [facility] LAB [facility]   8/25/2025  9:15 AM [facility] BREAST CENTER MAMMO1 SCR1 [facility] JARED [place]   8/27/2025 11:00 AM Jasmyne Tsang MD [facility] HEMONC [facility]   11/4/2025 10:30 AM INJECTION CHAIR 02, [facility] CHEMOTHERAPY INFUSION [facility]        MARC Grier

## 2025-07-09 LAB — BACTERIA UR CULT: ABNORMAL

## 2025-07-09 RX ORDER — NITROFURANTOIN 25; 75 MG/1; MG/1
100 CAPSULE ORAL 2 TIMES DAILY
Qty: 14 CAPSULE | Refills: 0 | Status: SHIPPED | OUTPATIENT
Start: 2025-07-09 | End: 2025-07-16

## 2025-07-10 ENCOUNTER — TELEPHONE (OUTPATIENT)
Dept: FAMILY MEDICINE | Facility: CLINIC | Age: 75
End: 2025-07-10
Payer: MEDICARE

## 2025-07-10 DIAGNOSIS — R10.31 RIGHT INGUINAL PAIN: Primary | ICD-10-CM

## 2025-07-10 NOTE — TELEPHONE ENCOUNTER
Copied from CRM #[id number]. Topic: General Inquiry - Return Call  >> Jul 10, 2025 10:51 AM Memo wrote:  Who Called: Claire BEARD Ashia    Patient is returning phone call    Who Left Message for Patient:Heidy  Does the patient know what this is regarding?:test results      Preferred Method of Contact: Phone Call  Patient's Preferred Phone Number on File: [phone]   Best Call Back Number, if different:  Additional Information: Patient states she has a missed call from clinic.

## 2025-07-10 NOTE — TELEPHONE ENCOUNTER
I ordered imaging. Will call with results when available.  She may still need to be seen so have her schedule an appt for after xr will be completed.         I have signed for the following orders AND/OR meds.  Please call the patient and ask the patient to schedule the testing AND/OR inform about any medications that were sent.      Orders Placed This Encounter   Procedures    X-Ray Hip 1 View Right (with Pelvis when performed)     Standing Status:   Future     Expected Date:   7/10/2025     Expiration Date:   7/10/2026     May the Radiologist modify the order per protocol to meet the clinical needs of the patient?:   Yes     Release to patient:   Immediate

## 2025-07-10 NOTE — TELEPHONE ENCOUNTER
Patient reports groin pain on right side for the last 3-4 weeks. She said it is where her leg meets near her vaginal area, and also where the hip meets the leg. I asked if it felt muscular, or like a pulling pain, she said it was hard to describe. Keeps her up at night , and is bothering her while she drives her car. Taking ibuprofen QID with no relief. Asked if she needs to be see again since she was here 7/7. Asked if she should have x ray?

## 2025-07-14 RX ORDER — MUPIROCIN 20 MG/G
OINTMENT TOPICAL
Qty: 22 G | Refills: 5 | Status: SHIPPED | OUTPATIENT
Start: 2025-07-14

## 2025-07-18 DIAGNOSIS — N18.32 TYPE 2 DIABETES MELLITUS WITH STAGE 3B CHRONIC KIDNEY DISEASE, WITHOUT LONG-TERM CURRENT USE OF INSULIN: ICD-10-CM

## 2025-07-18 DIAGNOSIS — E11.22 TYPE 2 DIABETES MELLITUS WITH STAGE 3B CHRONIC KIDNEY DISEASE, WITHOUT LONG-TERM CURRENT USE OF INSULIN: ICD-10-CM

## 2025-07-18 RX ORDER — SEMAGLUTIDE 1.34 MG/ML
1 INJECTION, SOLUTION SUBCUTANEOUS
Qty: 9 ML | Refills: 0 | Status: SHIPPED | OUTPATIENT
Start: 2025-07-18 | End: 2026-07-18

## 2025-07-18 NOTE — TELEPHONE ENCOUNTER
Copied from CRM #[id number]. Topic: Medications - Medication Refill  >> Jul 18, 2025  9:49 AM Daphney wrote:  Who Called: Claire Ang    Refill or New Rx:Refill  RX Name and Strength:semaglutide (OZEMPIC) 1 mg/dose (4 mg/3 mL)  How is the patient currently taking it? (ex. 1XDay):  Is this a 30 day or 90 day RX:9ml  Local or Mail Order:mail  List of preferred pharmacies on file (remove unneeded): [unfilled][facility] - [facility] AT [address] TO [facility] [address]  If different Pharmacy is requested, enter Pharmacy information here including location and phone number: \   Ordering Provider:      Preferred Method of Contact: Phone Call  Patient's Preferred Phone Number on File: [phone]   Best Call Back Number, if different:  Additional Information: pt called for med refill ans asking for 1.0 dosage pls advise

## 2025-07-18 NOTE — TELEPHONE ENCOUNTER
Copied from CRM #[id number]. Topic: General Inquiry - Patient Advice  >> Jul 18, 2025  9:48 AM Daphney wrote:  Who Called: Claire Ang    Caller is requesting assistance/information from provider's office.    Symptoms (please be specific):    How long has patient had these symptoms:    List of preferred pharmacies on file (remove unneeded): [unfilled]  If different, enter pharmacy into here including location and phone number:       Preferred Method of Contact: Phone Call  Patient's Preferred Phone Number on File: [phone]   Best Call Back Number, if different:  Additional Information: pt called to speak with nurse regarding right hip pain pls advise

## 2025-07-24 ENCOUNTER — HOSPITAL ENCOUNTER (EMERGENCY)
Facility: HOSPITAL | Age: 75
Discharge: HOME | End: 2025-07-24
Attending: EMERGENCY MEDICINE
Payer: MEDICARE

## 2025-07-24 ENCOUNTER — TELEPHONE (OUTPATIENT)
Dept: FAMILY MEDICINE | Facility: CLINIC | Age: 75
End: 2025-07-24

## 2025-07-24 ENCOUNTER — OFFICE VISIT (OUTPATIENT)
Dept: FAMILY MEDICINE | Facility: CLINIC | Age: 75
End: 2025-07-24
Payer: MEDICARE

## 2025-07-24 VITALS
HEIGHT: 64 IN | HEART RATE: 84 BPM | WEIGHT: 153 LBS | DIASTOLIC BLOOD PRESSURE: 89 MMHG | TEMPERATURE: 98 F | SYSTOLIC BLOOD PRESSURE: 154 MMHG | BODY MASS INDEX: 26.12 KG/M2 | OXYGEN SATURATION: 96 % | RESPIRATION RATE: 20 BRPM

## 2025-07-24 VITALS
WEIGHT: 153 LBS | DIASTOLIC BLOOD PRESSURE: 84 MMHG | BODY MASS INDEX: 26.12 KG/M2 | TEMPERATURE: 98 F | HEART RATE: 78 BPM | RESPIRATION RATE: 16 BRPM | SYSTOLIC BLOOD PRESSURE: 132 MMHG | HEIGHT: 64 IN | OXYGEN SATURATION: 98 %

## 2025-07-24 DIAGNOSIS — E11.22 TYPE 2 DIABETES MELLITUS WITH STAGE 3B CHRONIC KIDNEY DISEASE, WITHOUT LONG-TERM CURRENT USE OF INSULIN: ICD-10-CM

## 2025-07-24 DIAGNOSIS — R19.7 DIARRHEA, UNSPECIFIED TYPE: ICD-10-CM

## 2025-07-24 DIAGNOSIS — R10.30 LOWER ABDOMINAL PAIN: ICD-10-CM

## 2025-07-24 DIAGNOSIS — N18.32 TYPE 2 DIABETES MELLITUS WITH STAGE 3B CHRONIC KIDNEY DISEASE, WITHOUT LONG-TERM CURRENT USE OF INSULIN: ICD-10-CM

## 2025-07-24 DIAGNOSIS — N30.00 ACUTE CYSTITIS WITHOUT HEMATURIA: Primary | ICD-10-CM

## 2025-07-24 DIAGNOSIS — E03.9 ACQUIRED HYPOTHYROIDISM: ICD-10-CM

## 2025-07-24 DIAGNOSIS — R39.9 UTI SYMPTOMS: Primary | ICD-10-CM

## 2025-07-24 LAB
ALBUMIN SERPL-MCNC: 3.2 G/DL (ref 3.4–4.8)
ALBUMIN/GLOB SERPL: 0.9 RATIO (ref 1.1–2)
ALP SERPL-CCNC: 99 UNIT/L (ref 40–150)
ALT SERPL-CCNC: 14 UNIT/L (ref 0–55)
ANION GAP SERPL CALC-SCNC: 6 MEQ/L
AST SERPL-CCNC: 17 UNIT/L (ref 11–45)
BACTERIA #/AREA URNS AUTO: ABNORMAL /HPF
BACTERIA #/AREA URNS AUTO: ABNORMAL /HPF
BASOPHILS # BLD AUTO: 0.03 X10(3)/MCL
BASOPHILS NFR BLD AUTO: 0.4 %
BILIRUB SERPL-MCNC: 0.9 MG/DL
BILIRUB UR QL STRIP.AUTO: NEGATIVE
BILIRUB UR QL STRIP.AUTO: NEGATIVE
BUN SERPL-MCNC: 16.4 MG/DL (ref 9.8–20.1)
CALCIUM SERPL-MCNC: 8.8 MG/DL (ref 8.4–10.2)
CAOX CRY UR QL COMP ASSIST: ABNORMAL
CHLORIDE SERPL-SCNC: 107 MMOL/L (ref 98–107)
CLARITY UR: ABNORMAL
CLARITY UR: CLEAR
CO2 SERPL-SCNC: 27 MMOL/L (ref 23–31)
COLOR UR AUTO: ABNORMAL
COLOR UR AUTO: YELLOW
CREAT SERPL-MCNC: 0.97 MG/DL (ref 0.55–1.02)
CREAT/UREA NIT SERPL: 17
EOSINOPHIL # BLD AUTO: 0.21 X10(3)/MCL (ref 0–0.9)
EOSINOPHIL NFR BLD AUTO: 2.8 %
ERYTHROCYTE [DISTWIDTH] IN BLOOD BY AUTOMATED COUNT: 12.7 % (ref 11.5–17)
GFR SERPLBLD CREATININE-BSD FMLA CKD-EPI: >60 ML/MIN/1.73/M2
GLOBULIN SER-MCNC: 3.7 GM/DL (ref 2.4–3.5)
GLUCOSE SERPL-MCNC: 115 MG/DL (ref 82–115)
GLUCOSE UR QL STRIP: NEGATIVE
GLUCOSE UR QL STRIP: NORMAL
HCT VFR BLD AUTO: 34.4 % (ref 37–47)
HGB BLD-MCNC: 11.5 G/DL (ref 12–16)
HGB UR QL STRIP: ABNORMAL
HGB UR QL STRIP: ABNORMAL
IMM GRANULOCYTES # BLD AUTO: 0.01 X10(3)/MCL (ref 0–0.04)
IMM GRANULOCYTES NFR BLD AUTO: 0.1 %
KETONES UR QL STRIP: NEGATIVE
KETONES UR QL STRIP: NEGATIVE
LEUKOCYTE ESTERASE UR QL STRIP: 500
LEUKOCYTE ESTERASE UR QL STRIP: NEGATIVE
LYMPHOCYTES # BLD AUTO: 2.21 X10(3)/MCL (ref 0.6–4.6)
LYMPHOCYTES NFR BLD AUTO: 29.4 %
MCH RBC QN AUTO: 32.5 PG (ref 27–31)
MCHC RBC AUTO-ENTMCNC: 33.4 G/DL (ref 33–36)
MCV RBC AUTO: 97.2 FL (ref 80–94)
MONOCYTES # BLD AUTO: 0.46 X10(3)/MCL (ref 0.1–1.3)
MONOCYTES NFR BLD AUTO: 6.1 %
MUCOUS THREADS URNS QL MICRO: ABNORMAL /LPF
NEUTROPHILS # BLD AUTO: 4.59 X10(3)/MCL (ref 2.1–9.2)
NEUTROPHILS NFR BLD AUTO: 61.2 %
NITRITE UR QL STRIP: ABNORMAL
NITRITE UR QL STRIP: NEGATIVE
NRBC BLD AUTO-RTO: 0 %
PH UR STRIP: 5.5 [PH]
PH UR STRIP: 6 [PH]
PLATELET # BLD AUTO: 148 X10(3)/MCL (ref 130–400)
PMV BLD AUTO: 9.4 FL (ref 7.4–10.4)
POTASSIUM SERPL-SCNC: 3.8 MMOL/L (ref 3.5–5.1)
PROT SERPL-MCNC: 6.9 GM/DL (ref 5.8–7.6)
PROT UR QL STRIP: NEGATIVE
PROT UR QL STRIP: NEGATIVE
RBC # BLD AUTO: 3.54 X10(6)/MCL (ref 4.2–5.4)
RBC #/AREA URNS AUTO: ABNORMAL /HPF
RBC #/AREA URNS AUTO: ABNORMAL /HPF
SODIUM SERPL-SCNC: 140 MMOL/L (ref 136–145)
SP GR UR STRIP.AUTO: 1.02 (ref 1–1.03)
SP GR UR STRIP.AUTO: <=1.005 (ref 1–1.03)
SQUAMOUS #/AREA URNS AUTO: ABNORMAL /HPF
SQUAMOUS #/AREA URNS LPF: ABNORMAL /HPF
UROBILINOGEN UR STRIP-ACNC: 0.2
UROBILINOGEN UR STRIP-ACNC: NORMAL
WBC # BLD AUTO: 7.51 X10(3)/MCL (ref 4.5–11.5)
WBC #/AREA URNS AUTO: >100 /HPF
WBC #/AREA URNS AUTO: ABNORMAL /HPF
WBC CLUMPS UR QL AUTO: ABNORMAL

## 2025-07-24 PROCEDURE — 3079F DIAST BP 80-89 MM HG: CPT | Mod: CPTII,,, | Performed by: FAMILY MEDICINE

## 2025-07-24 PROCEDURE — 96361 HYDRATE IV INFUSION ADD-ON: CPT

## 2025-07-24 PROCEDURE — 80053 COMPREHEN METABOLIC PANEL: CPT | Performed by: EMERGENCY MEDICINE

## 2025-07-24 PROCEDURE — 85025 COMPLETE CBC W/AUTO DIFF WBC: CPT | Performed by: EMERGENCY MEDICINE

## 2025-07-24 PROCEDURE — 96375 TX/PRO/DX INJ NEW DRUG ADDON: CPT

## 2025-07-24 PROCEDURE — 99214 OFFICE O/P EST MOD 30 MIN: CPT | Mod: ,,, | Performed by: FAMILY MEDICINE

## 2025-07-24 PROCEDURE — 3075F SYST BP GE 130 - 139MM HG: CPT | Mod: CPTII,,, | Performed by: FAMILY MEDICINE

## 2025-07-24 PROCEDURE — 63600175 PHARM REV CODE 636 W HCPCS

## 2025-07-24 PROCEDURE — 1126F AMNT PAIN NOTED NONE PRSNT: CPT | Mod: CPTII,,, | Performed by: FAMILY MEDICINE

## 2025-07-24 PROCEDURE — 3044F HG A1C LEVEL LT 7.0%: CPT | Mod: CPTII,,, | Performed by: FAMILY MEDICINE

## 2025-07-24 PROCEDURE — 3008F BODY MASS INDEX DOCD: CPT | Mod: CPTII,,, | Performed by: FAMILY MEDICINE

## 2025-07-24 PROCEDURE — 1160F RVW MEDS BY RX/DR IN RCRD: CPT | Mod: CPTII,,, | Performed by: FAMILY MEDICINE

## 2025-07-24 PROCEDURE — 96374 THER/PROPH/DIAG INJ IV PUSH: CPT

## 2025-07-24 PROCEDURE — 25000003 PHARM REV CODE 250: Performed by: EMERGENCY MEDICINE

## 2025-07-24 PROCEDURE — 63600175 PHARM REV CODE 636 W HCPCS: Performed by: EMERGENCY MEDICINE

## 2025-07-24 PROCEDURE — 81003 URINALYSIS AUTO W/O SCOPE: CPT | Performed by: EMERGENCY MEDICINE

## 2025-07-24 PROCEDURE — 1159F MED LIST DOCD IN RCRD: CPT | Mod: CPTII,,, | Performed by: FAMILY MEDICINE

## 2025-07-24 PROCEDURE — 99285 EMERGENCY DEPT VISIT HI MDM: CPT | Mod: 25

## 2025-07-24 PROCEDURE — 25500020 PHARM REV CODE 255

## 2025-07-24 RX ORDER — CEFDINIR 300 MG/1
300 CAPSULE ORAL EVERY 12 HOURS
Qty: 14 CAPSULE | Refills: 0 | Status: SHIPPED | OUTPATIENT
Start: 2025-07-24 | End: 2025-07-31

## 2025-07-24 RX ORDER — ONDANSETRON HYDROCHLORIDE 2 MG/ML
4 INJECTION, SOLUTION INTRAVENOUS
Status: COMPLETED | OUTPATIENT
Start: 2025-07-24 | End: 2025-07-24

## 2025-07-24 RX ORDER — DICYCLOMINE HYDROCHLORIDE 10 MG/1
10 CAPSULE ORAL 2 TIMES DAILY PRN
Qty: 10 CAPSULE | Refills: 0 | Status: SHIPPED | OUTPATIENT
Start: 2025-07-24

## 2025-07-24 RX ORDER — SODIUM CHLORIDE 9 MG/ML
1000 INJECTION, SOLUTION INTRAVENOUS
Status: COMPLETED | OUTPATIENT
Start: 2025-07-24 | End: 2025-07-24

## 2025-07-24 RX ORDER — CEFTRIAXONE 1 G/1
1 INJECTION, POWDER, FOR SOLUTION INTRAMUSCULAR; INTRAVENOUS
Status: COMPLETED | OUTPATIENT
Start: 2025-07-24 | End: 2025-07-24

## 2025-07-24 RX ADMIN — ONDANSETRON 4 MG: 2 INJECTION INTRAMUSCULAR; INTRAVENOUS at 11:07

## 2025-07-24 RX ADMIN — SODIUM CHLORIDE 1000 ML: 9 INJECTION, SOLUTION INTRAVENOUS at 11:07

## 2025-07-24 RX ADMIN — CEFTRIAXONE SODIUM 1 G: 1 INJECTION, POWDER, FOR SOLUTION INTRAMUSCULAR; INTRAVENOUS at 02:07

## 2025-07-24 RX ADMIN — IOHEXOL 100 ML: 350 INJECTION, SOLUTION INTRAVENOUS at 12:07

## 2025-07-24 NOTE — PROGRESS NOTES
"Subjective:        Patient ID: Claire Ang is a 75 y.o. female.    Chief Complaint: Follow-up (UTI follow up, diarrhea )      Patient presents to the clinic unaccompanied for complaint.   She is due for her wellness visit in july.  Scheduled for 8/2025.  Patient has had issues with another "Claire Ang" filling controlled medications.  Patient states she is not on clonazepam.  She fills with [facility] or [facility] locally.  Does not fill with [facility].     She reports concern for uti.  Thinks continuation of previous uti from 7/7/25 that never resolved. Is weak. Dysuria.  +urine color change.  [place] dr. Gamboa [place].  Rx med every day. Felt good while on. Thinks was keflex.  Haven't seen him in awhile.   Has pelvic pressure.  Feels fatigue.     Diarrhea since Thursday - ate sheela pizano in [place] with her friend.  Friend sick too. C/o Stomach cramps. Foul smelling diarrhea.  No nausea. Tolerating PO.       she has diabetes.  She reports compliance with her medications.  Her last A1c was 6.8 2/2025. Her last foot exam was 7/2024.cbg in clinic is 209. Ate 1/2 lindsey cracker and chocolate milk this am.    Her ophthalmologist is Dr. denise who told her that she had the beginnings of macular degeneration and was referred to Dr. Vick.   She was previously on metformin but this was stopped after her labs of 4/2021 showed elevated creatinine.  This was replaced with glipizide but made her nauseous so she stopped. she is currently on ozempic 1mg weekly.              She has hypertension and hyperlipidemia.  Her cardiologist is Dr. Daigle.  On zetia and statin.  Had open heart surgery in 10/2023.      She has hypothyroidism and is on Synthroid name brand.  Her calcium has been elevated in the past. Not taking calcium supplement. Sometimes takes tums. Pth, vitamin d and ionized calcium 3/2023 were wnl      Went to [facility] ER 3/2/23 for vomiting. Got IV fluids.   Cmp there calcium was 10.2.   had " "ct abd done as well which also reported emphysematous changed at lung bases.  Denies sob. Never smoker.  Brother- lung cancer (dx 69). Another brother-  from lung disease (not cancer) at age 78. Dad had asbestosis. Mom dx age 65 from "oat" cell carcinoma.  Mom was smoker. No other relatives smoked. Cxr 3/2023 showed hyperinflation. pulmonology- dr. Yanes in .         She has acid reflux and reports compliance with her PPI and H2 blocker.  Previously she saw Dr. Tavera and is requesting to establish with a new Gastro, Dr. Peng.  Her last colonoscopy was with Dr. tavera 1/16/15  Which showed external hemorrhoids and sigmoid diverticulosis      She has anxiety and depression and followed with Psychiatry, Dr. Aguirre.  She is on Wellbutrin.  She is filling this from our office. Needs refill. She is not on controlled meds.      She has ckd.  Seeing dr. Robison.  Has follow up  2023     she has a history of breast cancer and had a right mastectomy.  She is followed by Dr. Tsang.  Appt 23 with labs.   She was on tamoxifen.  She wears a prosthetic. mammogram 2022 was normal. breast mri 2023 wnl. She has had a complete hysterectomy at age 37 her heavy menses and therefore no longer does Pap smears     She has osteoporosis and was on Prolia.  She takes vitamin-D.  does not take calcium due to elevated calcium.  Her last bone density test was 2021     she has a personal history of skin cancer.  Her dermatologist is Dr. Trujillo.      She is allergic to sulfa, hydrocodone, meperidine and morphine. She declines immunizations today     She had left cataract surgery with dr. Zapien 2023     Got covid 2022 while on cruise. Took ivermectin. Is better.                 Review of Systems   Constitutional:  Positive for fatigue. Negative for fever.   HENT: Negative.     Respiratory: Negative.     Cardiovascular: Negative.    Gastrointestinal:  Positive for abdominal pain and diarrhea. Negative for " "constipation and vomiting.   Genitourinary:  Positive for dysuria and pelvic pain. Negative for frequency and urgency.         Review of patient's allergies indicates:   Allergen Reactions    Amiodarone      Other Reaction(s): NO AFIB ON  2/2024    Amlodipine      Other Reaction(s): BACK PAIN/LEG PAIN/SIDE PAIN    Hydrocodone bitartrate      Other reaction(s): GI Intolerance  Other reaction(s): GI Intolerance  Acetaminophen hydrocodone bitartrate  Acetaminophen hydrocodone bitartrate      Sulfamethoxazole-trimethoprim      Other reaction(s): stomach pain  Other reaction(s): stomach pain      Valsartan      Other Reaction(s): BACK PAIN/SIDE PAIN/LEG PAIN    Valsartan-hydrochlorothiazide      Other Reaction(s): harsh to kidney's    Hydrocodone-acetaminophen Nausea And Vomiting     Other reaction(s): Visual hallucinations  Other reaction(s): Visual hallucinations      Meperidine Nausea And Vomiting     Other reaction(s): GI Intolerance, Visual hallucinations  Other reaction(s): GI Intolerance, Visual hallucinations    Other reaction(s): Vomitting    Morphine Palpitations     Other reaction(s): chest tightness  Other reaction(s): chest tightness        Vitals:    07/24/25 0933   BP: 132/84   BP Location: Left arm   Patient Position: Sitting   Pulse: 78   Resp: 16   Temp: 97.9 °F (36.6 °C)   TempSrc: Oral   SpO2: 98%   Weight: 69.4 kg (153 lb)   Height: 5' 4" (1.626 m)      Social History     Socioeconomic History    Marital status:    Tobacco Use    Smoking status: Never     Passive exposure: Never    Smokeless tobacco: Never   Substance and Sexual Activity    Alcohol use: Never    Drug use: Not Currently    Sexual activity: Not Currently     Partners: Male     Birth control/protection: None   Social History Narrative    ** Merged History Encounter **          Social Drivers of Health     Financial Resource Strain: Low Risk  (2/3/2025)    Overall Financial Resource Strain (CARDIA)     Difficulty of Paying " Living Expenses: Not hard at all   Food Insecurity: No Food Insecurity (4/13/2025)    [facility]    Hunger Vital Sign     Worried About Running Out of Food in the Last Year: Never true     Ran Out of Food in the Last Year: Never true   Recent Concern: Food Insecurity - Food Insecurity Present (2/3/2025)    Hunger Vital Sign     Worried About Running Out of Food in the Last Year: Never true     Ran Out of Food in the Last Year: Sometimes true   Transportation Needs: No Transportation Needs (4/13/2025)    [facility]    PRAPARE - Transportation     Lack of Transportation (Medical): No     Lack of Transportation (Non-Medical): No   Physical Activity: Sufficiently Active (2/3/2025)    Exercise Vital Sign     Days of Exercise per Week: 3 days     Minutes of Exercise per Session: 50 min   Stress: Stress Concern Present (2/3/2025)    [employer] - Occupational Stress Questionnaire     Feeling of Stress : To some extent   Housing Stability: Unknown (4/13/2025)    [facility]    Housing Stability Vital Sign     Homeless in the Last Year: No      Family History   Problem Relation Name Age of Onset    Cancer Mother Lung     Alcohol abuse Mother Lung     Cancer Brother Lung         Cancer    Lung disease Brother Lung           Objective:     Physical Exam  Vitals and nursing note reviewed.   Constitutional:       General: She is awake.      Appearance: Normal appearance. She is normal weight.   HENT:      Head: Normocephalic and atraumatic.      Nose: Nose normal.      Mouth/Throat:      Mouth: Mucous membranes are moist.      Pharynx: Oropharynx is clear.   Eyes:      Extraocular Movements: Extraocular movements intact.   Cardiovascular:       Rate and Rhythm: Normal rate and regular rhythm.      Pulses: Normal pulses.      Heart sounds: Normal heart sounds.   Pulmonary:      Effort: Pulmonary effort is normal.      Breath sounds: Normal breath sounds.   Musculoskeletal:         General: Normal range of motion.      Cervical back: Normal range of motion.   Skin:     General: Skin is warm and dry.   Neurological:      General: No focal deficit present.      Mental Status: She is alert and oriented to person, place, and time. Mental status is at baseline.   Psychiatric:         Mood and Affect: Mood normal.         Behavior: Behavior is cooperative.       Medications Ordered Prior to Encounter[1]  Health Maintenance   Topic Date Due    COVID-19 Vaccine (3 - Pfizer risk series) 04/19/2021    Diabetes Urine Screening  07/29/2025    Foot Exam  07/31/2025    Hemoglobin A1c  08/06/2025    DEXA Scan  08/21/2025    Mammogram  08/22/2025    TETANUS VACCINE  02/10/2026 (Originally 9/28/2017)    Shingles Vaccine (2 of 2) 02/10/2026 (Originally 2/2/2021)    Influenza Vaccine (1) 09/01/2025    Lipid Panel  10/10/2025    Diabetic Eye Exam  10/31/2025    Colorectal Cancer Screening  08/05/2029    Hepatitis C Screening  Completed    RSV Vaccine (Age 60+ and Pregnant patients)  Completed    Pneumococcal Vaccines (Age 50+)  Completed      Results for orders placed or performed in visit on 07/24/25   Urine Culture High Risk    Collection Time: 07/24/25 10:01 AM    Specimen: Urine, Clean Catch   Result Value Ref Range    Urine Culture >/= 100,000 colonies/ml Escherichia coli (A)        Susceptibility    Escherichia coli - SEBLE     Ampicillin 16 Intermediate      Cefazolin (Urine) 2 Sensitive      Cefazolin (Other) 2 Sensitive      Cefepime <=0.12 Sensitive      Ceftriaxone <=0.25 Sensitive      Ciprofloxacin >=4 Resistant      ESBL Neg Negative      Gentamicin <=1 Sensitive      Levofloxacin >=8 Resistant      Meropenem <=0.25 Sensitive      Nitrofurantoin 128 Resistant       Piperacillin/Tazobactam <=4 Sensitive      Trimeth/Sulfa <=20 Sensitive    Urinalysis    Collection Time: 07/24/25 10:01 AM   Result Value Ref Range    Color, UA Yellow Yellow, Light-Yellow, Colorless, Straw, Dark-Yellow    Appearance, UA Turbid (A) Clear    Specific Gravity, UA 1.016 1.005 - 1.030    pH, UA 5.5 5.0 - 8.5    Protein, UA Negative Negative    Glucose, UA Normal Negative, Normal    Ketones, UA Negative Negative    Blood, UA 1+ (A) Negative    Bilirubin, UA Negative Negative    Urobilinogen, UA Normal 0.2, 1.0, Normal    Nitrites, UA 2+ (A) Negative    Leukocyte Esterase,  (A) Negative    RBC, UA 6-10 (A) None Seen, 0-2, 3-5, 0-5 /HPF    WBC, UA >100 (A) None Seen, 0-2, 3-5, 0-5 /HPF    WBC Clumps, UA Many (A) None Seen, 0-5    Bacteria, UA Many (A) None Seen, Trace /HPF    Squamous Epithelial Cells, UA Occasional (A) None Seen, Trace /HPF    Mucous, UA Trace (A) None Seen /LPF    Calcium Oxalate Crystals, UA Few (A) None Seen          Assessment & Plan:     Active Problem List with Overview Notes    Diagnosis Date Noted    Diarrhea 07/28/2025    Skin lump of leg, left 03/03/2025    Left leg pain 03/03/2025    Right otitis media 02/19/2025    Viral gastroenteritis 02/19/2025    Pelvic pressure in female 02/10/2025    Neck pain 10/15/2024    Fatigue 09/23/2024    Wellness examination 07/31/2024    Urinary tract infection without hematuria 07/31/2024    Callus of foot 07/31/2024    Compression fracture of T12 vertebra 04/11/2024    Coronary artery disease involving native coronary artery of native heart with unstable angina pectoris 10/11/2023    Breast cancer screening by mammogram 08/15/2023    Hypercalcemia 03/27/2023    Other emphysema 03/27/2023    Advanced care planning/counseling discussion 02/07/2023    UTI symptoms 02/07/2023    Gout 02/07/2023    Stage 3b chronic kidney disease     Chronic cystitis 10/26/2022    Hyperlipidemia associated with type 2 diabetes mellitus 08/26/2022     Hypertension associated with diabetes 08/26/2022    Postmenopausal 08/26/2022    History of skin cancer 08/26/2022    Malignant neoplasm of central portion of right breast in female, estrogen receptor positive 08/05/2022    Acute pain of left shoulder 07/13/2022    Diabetes mellitus 05/26/2022    Hypothyroidism 05/26/2022    Anxiety 05/26/2022     Formatting of this note might be different from the original.   Last Assessment & Plan:     Formatting of this note might be different from the original.    Stable on current prescription meds. Keep appointments with psych      Depressive disorder 05/26/2022     Formatting of this note might be different from the original.   Last Assessment & Plan:     Formatting of this note might be different from the original.    Stable on current prescription meds. Keep appointments with psych      History of breast cancer 05/26/2022    Genital herpes simplex 05/26/2022    Malignant neoplasm of skin of upper extremity 05/26/2022    Age-related osteoporosis without current pathological fracture 04/05/2022    Gastroesophageal reflux disease 02/08/2018     Formatting of this note might be different from the original.   Last Assessment & Plan:     Formatting of this note might be different from the original.    On PPI and H2 blocker.  Needs to establish with new GI since dr. tavera retired.      Osteoporosis 01/11/2017       1. UTI symptoms  Assessment & Plan:  +urine culture from 7/7/25 showed e coli.  Macrobid was sent in but was resistant.  She is still having symptoms.  Dysuria, weak, diarrhea, stomach cramps.  Culture shows sensitive to IV antibiotics or rocephin.  We do not have rocephin in clinic.  Due to reported symptoms, will send to ER for evaluation. Patient is agreeable and verbalized understanding.  Patient was brought to Er by clinic staff.     Orders:  -     Pancreatic elastase, fecal; Future; Expected date: 07/24/2025  -     Fecal fat, qualitative; Future; Expected date:  07/24/2025  -     WBC, Stool; Future; Expected date: 07/24/2025  -     Rotavirus antigen, stool; Future; Expected date: 07/24/2025  -     Adenovirus Molecular Detection, PCR, Non-Blood Stool; Future; Expected date: 07/24/2025  -     Calprotectin, Stool; Future; Expected date: 07/24/2025  -     Giardia / Cryptosporidum, EIA; Future; Expected date: 07/24/2025  -     Stool Exam-Ova,Cysts,Parasites; Future; Expected date: 07/24/2025  -     Urinalysis  -     Urine Culture High Risk  -     CBC Auto Differential; Future; Expected date: 07/24/2025  -     Comprehensive Metabolic Panel; Future; Expected date: 07/24/2025  -     TSH; Future; Expected date: 07/24/2025  -     Hemoglobin A1C; Future; Expected date: 07/24/2025    2. Diarrhea, unspecified type  Assessment & Plan:  Labs in Er.  Will also get stool studies.  Tolerating po.  Encourage fluids and rest. Monitor symptoms. Follow brat diet. Contact clinic for concerns. Patient is agreeable to plan and verbalized understanding    Orders:  -     Pancreatic elastase, fecal; Future; Expected date: 07/24/2025  -     Fecal fat, qualitative; Future; Expected date: 07/24/2025  -     WBC, Stool; Future; Expected date: 07/24/2025  -     Rotavirus antigen, stool; Future; Expected date: 07/24/2025  -     Adenovirus Molecular Detection, PCR, Non-Blood Stool; Future; Expected date: 07/24/2025  -     Calprotectin, Stool; Future; Expected date: 07/24/2025  -     Giardia / Cryptosporidum, EIA; Future; Expected date: 07/24/2025  -     Stool Exam-Ova,Cysts,Parasites; Future; Expected date: 07/24/2025  -     Urinalysis  -     Urine Culture High Risk  -     CBC Auto Differential; Future; Expected date: 07/24/2025  -     Comprehensive Metabolic Panel; Future; Expected date: 07/24/2025  -     TSH; Future; Expected date: 07/24/2025  -     Hemoglobin A1C; Future; Expected date: 07/24/2025    3. Type 2 diabetes mellitus with stage 3b chronic kidney disease, without long-term current use of  insulin  Assessment & Plan:  Lab Results   Component Value Date    HGBA1C 6.8 02/06/2025     Foot exam 7/2024  Eye exam with dr. Zapien.   Urine micro 7/2024    Encouraged compliance with dmii diet.     Was having hypoglycemic episodes on glimepiride.     on ozempic  1mg. Doing well. On statin      Contact clinic for concerns.       Orders:  -     CBC Auto Differential; Future; Expected date: 07/24/2025  -     Comprehensive Metabolic Panel; Future; Expected date: 07/24/2025  -     TSH; Future; Expected date: 07/24/2025  -     Hemoglobin A1C; Future; Expected date: 07/24/2025    4. Acquired hypothyroidism  Assessment & Plan:  Lab Results   Component Value Date    TSH 0.945 02/06/2025     Stable on synthroid dose    Orders:  -     CBC Auto Differential; Future; Expected date: 07/24/2025  -     Comprehensive Metabolic Panel; Future; Expected date: 07/24/2025  -     TSH; Future; Expected date: 07/24/2025  -     Hemoglobin A1C; Future; Expected date: 07/24/2025         Follow up for as scheduled or sooner prn.          [1]   Current Outpatient Medications on File Prior to Visit   Medication Sig Dispense Refill    atorvastatin (LIPITOR) 40 MG tablet Take 40 mg by mouth every evening.      buPROPion (WELLBUTRIN SR) 100 MG TBSR 12 hr tablet Take 1 tablet (100 mg total) by mouth 2 (two) times daily. 10 tablet 0    cetirizine (ZYRTEC) 10 MG tablet Take 10 mg by mouth.      ciprofloxacin-dexAMETHasone 0.3-0.1% (CIPRODEX) 0.3-0.1 % DrpS Place 4 drops into both ears 2 (two) times daily. 7.5 mL 0    cyanocobalamin, vitamin B-12, 1,000 mcg Subl Take by mouth once daily.      doxazosin (CARDURA) 1 MG tablet Take 1 mg by mouth.      famotidine (PEPCID) 20 MG tablet Take 20 mg by mouth 2 (two) times daily.      flash glucose scanning reader Misc 1 each by Misc.(Non-Drug; Combo Route) route Daily. 200 each 11    FREESTYLE PAULO 14 DAY SENSOR Kit USE TO MONITOR BLOOD SUGAR (DX E11.9) 2 kit 99    lactulose (CHRONULAC) 10 gram/15 mL  solution Take 30 mLs by mouth.      levothyroxine (SYNTHROID) 75 MCG tablet Take 1 tablet (75 mcg total) by mouth every morning. BRAND NAME NECESSARY 90 tablet 3    methocarbamoL (ROBAXIN) 750 MG Tab Take 750 mg by mouth 3 (three) times daily.      mupirocin (BACTROBAN) 2 % ointment APPLY TOPICALLY 3 TIMES A  DAY 22 g 5    pantoprazole (PROTONIX) 40 MG tablet Take 1 tablet (40 mg total) by mouth once daily. 90 tablet 2    semaglutide (OZEMPIC) 1 mg/dose (4 mg/3 mL) Inject 1 mg into the skin every 7 days. 9 mL 0    valACYclovir (VALTREX) 1000 MG tablet Take by mouth as needed.      ezetimibe (ZETIA) 10 mg tablet Take 1 tablet (10 mg total) by mouth every evening. 90 tablet 3     No current facility-administered medications on file prior to visit.

## 2025-07-24 NOTE — TELEPHONE ENCOUNTER
Copied from CRM #[id number]. Topic: Appointments - Appointment Confirmation  >> Jul 24, 2025  8:49 AM Adriane wrote:  Who Called: Claire Ang    Caller is requesting a same day appointment. Caller declined first available appointment listed below.      When is the first available appointment?07/28  Options offered (Virtual Visit, Urgent Care):   Symptoms or reason for appointment:     Patient's Preferred Phone Number on File: [phone]   Best Call Back Number, if different:    Additional Information: pt would like to see provider today, pt feels weakness, was prev dx with UTI (at 07/07 appt) , doesn't feel its improved. Pt also noticed abnormal results in which she has not received a follow up call to discuss , asked for a call from nurse to follow up , pt waiting for call to determine if she should go to urgent care

## 2025-07-24 NOTE — ED PROVIDER NOTES
Encounter Date: 7/24/2025       History     Chief Complaint   Patient presents with    Urinary Frequency     Pt to er c/o urinary frequency, weakness and diarrhea.     75 y.o. White female presents to Emergency Department with a chief complaint of abdominal pain. Symptoms began 1 week ago after eating at a restaurant and have been constant since onset. Patient seen by PCP on today and instructed to come to ER for treatment of UTI. Recently treated with Macrobid earlier this month, symptoms did not resolve. Associated symptoms include diarrhea, atraumatic lower back pain, and dysuria. Symptoms are aggravated with exertion and there are no alleviating factors. The patient denies CP, SOB, fever, chills, vomiting, or dizziness. No other reported symptoms at this time      The history is provided by the patient. No  was used.   Abdominal Pain  The current episode started several days ago. The onset of the illness was abrupt. The problem has not changed since onset.The abdominal pain is located in the LLQ and RLQ. The abdominal pain does not radiate. The other symptoms of the illness include diarrhea and dysuria. The other symptoms of the illness do not include fever, shortness of breath, nausea or vomiting.   The diarrhea began 6 to 7 days ago. The diarrhea occurs 5 - 10 times per day.   The dysuria began more than 1 week ago. The dysuria is not associated with discharge, frequency, urgency or vaginal pain.   The patient has had a change in bowel habit. Risk factors for an acute abdominal problem include being elderly. Additional symptoms associated with the illness include back pain. Symptoms associated with the illness do not include chills, diaphoresis, heartburn, constipation, urgency or frequency.     Review of patient's allergies indicates:   Allergen Reactions    Amiodarone      Other Reaction(s): NO AFIB ON  2/2024    Amlodipine      Other Reaction(s): BACK PAIN/LEG PAIN/SIDE PAIN     Hydrocodone bitartrate      Other reaction(s): GI Intolerance  Other reaction(s): GI Intolerance  Acetaminophen hydrocodone bitartrate  Acetaminophen hydrocodone bitartrate      Sulfamethoxazole-trimethoprim      Other reaction(s): stomach pain  Other reaction(s): stomach pain      Valsartan      Other Reaction(s): BACK PAIN/SIDE PAIN/LEG PAIN    Valsartan-hydrochlorothiazide      Other Reaction(s): harsh to kidney's    Hydrocodone-acetaminophen Nausea And Vomiting     Other reaction(s): Visual hallucinations  Other reaction(s): Visual hallucinations      Meperidine Nausea And Vomiting     Other reaction(s): GI Intolerance, Visual hallucinations  Other reaction(s): GI Intolerance, Visual hallucinations    Other reaction(s): Vomitting    Morphine Palpitations     Other reaction(s): chest tightness  Other reaction(s): chest tightness       Past Medical History:   Diagnosis Date    Anxiety     CAD (coronary artery disease)     Callus of foot 07/31/2024    Chronic cystitis 10/26/2022    Compression fracture of T12 vertebra 04/11/2024    Coronary artery disease involving native coronary artery of native heart with unstable angina pectoris 10/11/2023    Depressive disorder 05/26/2022    Formatting of this note might be different from the original.   Last Assessment & Plan:     Formatting of this note might be different from the original.    Stable on current prescription meds. Keep appointments with psych      Diabetes mellitus     Fibromyalgia     Gastric ulcer     Gastroesophageal reflux disease 02/08/2018    Formatting of this note might be different from the original.   Last Assessment & Plan:     Formatting of this note might be different from the original.    On PPI and H2 blocker.  Needs to establish with new GI since dr. tavera retired.      Genital herpes simplex 05/26/2022    Gout 02/07/2023    History of breast cancer 05/26/2022    History of skin cancer 08/26/2022    Hypercalcemia 03/27/2023     Hyperlipidemia associated with type 2 diabetes mellitus 2022    Hypothyroidism     Malignant neoplasm of central portion of right breast in female, estrogen receptor positive 2022    Nephrolithiasis     NSTEMI (non-ST elevated myocardial infarction)     Onychomycosis     Osteoporosis     Other emphysema 2023    Paroxysmal atrial fibrillation     Postmenopausal 2022    Stage 3b chronic kidney disease      Past Surgical History:   Procedure Laterality Date    APPENDECTOMY      BREAST SURGERY      Breast cancer     SECTION  1975    Birth Boy    CHOLECYSTECTOMY  2007    COLONOSCOPY  2015    Rakesh Henriquez MD    CORONARY ARTERY BYPASS GRAFT  10/20/2023    coronary stents  10/08/2023    COSMETIC SURGERY      Cancer in face    EYE SURGERY  2023    [facility]    HERNIA REPAIR      HYSTERECTOMY  1987    MASTECTOMY Right     PHACOEMULSIFICATION, CATARACT, WITH IOL INSERTION Left 2023    Procedure: PHACOEMULSIFICATION, CATARACT, WITH IOL INSERTION- OS;  Surgeon: Stefanie Zapien MD;  Location: [facility];  Service: Ophthalmology;  Laterality: Left;    PLACEMENT-STENT      rt kidney stent      TUBAL LIGATION      UPPER GASTROINTESTINAL ENDOSCOPY  2022     Family History   Problem Relation Name Age of Onset    Cancer Mother Lung     Alcohol abuse Mother Lung     Cancer Brother Lung         Cancer    Lung disease Brother Lung      Social History[1]  Review of Systems   Constitutional:  Negative for chills, diaphoresis and fever.   Eyes:  Negative for photophobia and visual disturbance.   Respiratory:  Negative for cough, shortness of breath, wheezing and stridor.    Cardiovascular:  Negative for chest pain and leg swelling.   Gastrointestinal:  Positive for abdominal pain and diarrhea. Negative for constipation, heartburn, nausea and vomiting.   Genitourinary:  Positive for dysuria. Negative for frequency, urgency and vaginal pain.   Musculoskeletal:  Positive for  back pain. Negative for gait problem and myalgias.   All other systems reviewed and are negative.      Physical Exam     Initial Vitals [07/24/25 1027]   BP Pulse Resp Temp SpO2   (!) 154/89 84 20 97.5 °F (36.4 °C) 96 %      MAP       --         Physical Exam    Nursing note and vitals reviewed.  Constitutional: She appears well-developed and well-nourished. She is not diaphoretic. She is cooperative.  Non-toxic appearance. No distress.   HENT:   Head: Normocephalic and atraumatic.   Right Ear: External ear normal.   Left Ear: External ear normal.   Nose: Nose normal.   Eyes: Conjunctivae and EOM are normal. Pupils are equal, round, and reactive to light.   Neck: Neck supple. No thyromegaly present. No tracheal deviation present.   Normal range of motion.  Cardiovascular:  Normal rate, regular rhythm, S1 normal, S2 normal, normal heart sounds, intact distal pulses and normal pulses.           Pulmonary/Chest: Effort normal and breath sounds normal. No accessory muscle usage or stridor. No tachypnea and no bradypnea. No respiratory distress. She has no decreased breath sounds. She has no wheezes. She has no rhonchi. She has no rales. She exhibits no tenderness.   Abdominal: Abdomen is soft. Bowel sounds are normal. She exhibits no distension. There is abdominal tenderness in the right lower quadrant and left lower quadrant.   Abdomen is soft. No guarding. No peritoneal signs.    No right CVA tenderness.  No left CVA tenderness. There is no rebound and no guarding.   Musculoskeletal:         General: Tenderness present. Normal range of motion.      Cervical back: Normal range of motion and neck supple.        Back:       Comments: Tenderness noted to outlined area. No step offs or deformities noted. FROM noted.      Neurological: She is alert and oriented to person, place, and time. She has normal strength. No sensory deficit. GCS score is 15. GCS eye subscore is 4. GCS verbal subscore is 5. GCS motor subscore is 6.    Skin: Skin is warm and dry. Capillary refill takes less than 2 seconds.   Psychiatric: She has a normal mood and affect. Thought content normal.         ED Course   Procedures  Labs Reviewed   COMPREHENSIVE METABOLIC PANEL - Abnormal       Result Value    Sodium 140      Potassium 3.8      Chloride 107      CO2 27      Glucose 115      Blood Urea Nitrogen 16.4      Creatinine 0.97      Calcium 8.8      Protein Total 6.9      Albumin 3.2 (*)     Globulin 3.7 (*)     Albumin/Globulin Ratio 0.9 (*)     Bilirubin Total 0.9      ALP 99      ALT 14      AST 17      eGFR >60      Anion Gap 6.0      BUN/Creatinine Ratio 17     CBC WITH DIFFERENTIAL - Abnormal    WBC 7.51      RBC 3.54 (*)     Hgb 11.5 (*)     Hct 34.4 (*)     MCV 97.2 (*)     MCH 32.5 (*)     MCHC 33.4      RDW 12.7      Platelet 148      MPV 9.4      Neut % 61.2      Lymph % 29.4      Mono % 6.1      Eos % 2.8      Basophil % 0.4      Imm Grans % 0.1      Neut # 4.59      Lymph # 2.21      Mono # 0.46      Eos # 0.21      Baso # 0.03      Imm Gran # 0.01      NRBC% 0.0     URINALYSIS - Abnormal    Color, UA Straw      Appearance, UA Clear      Specific Gravity, UA <=1.005      pH, UA 6.0      Protein, UA Negative      Glucose, UA Negative      Ketones, UA Negative      Blood, UA Small (*)     Bilirubin, UA Negative      Urobilinogen, UA 0.2      Nitrites, UA Negative      Leukocyte Esterase, UA Negative     URINALYSIS, MICROSCOPIC - Abnormal    Bacteria, UA Moderate (*)     RBC, UA 0-2      WBC, UA 0-2      Squamous Epithelial Cells, UA Rare     CLOSTRIDIOIDES DIFFICILE TOXIN A AND B, EIA   CBC W/ AUTO DIFFERENTIAL    Narrative:     The following orders were created for panel order CBC auto differential.  Procedure                               Abnormality         Status                     ---------                               -----------         ------                     CBC with Differential[[id number]]       Abnormal            Final result                  Please view results for these tests on the individual orders.   GI PANEL          Imaging Results              CT Abdomen Pelvis With IV Contrast NO Oral Contrast (Final result)  Result time 07/24/25 13:51:42      Final result by Francie Downing MD (07/24/25 13:51:42)                   Impression:      1. Minimal inflammatory changes around the urinary bladder, may be infectious.  2. Otherwise no acute abnormality of the abdomen or pelvis.      Electronically signed by: Francie Downing  Date:    07/24/2025  Time:    13:51               Narrative:    EXAMINATION:  CT ABDOMEN PELVIS WITH IV CONTRAST    CLINICAL HISTORY:  UTI, recurrent/complicated (Female);Abdominal pain, acute, nonlocalized;    TECHNIQUE:  CT imaging was performed of the abdomen and pelvis after the administration of intravenous contrast. Dose length product is 300 mGycm. Automatic exposure control, adjustment of mA/kV or iterative reconstruction technique was used to limit radiation dose.    COMPARISON:  CT abdomen pelvis dated 09/06/2024    FINDINGS:  Liver: Normal.    Gallbladder and biliary tree: The gallbladder has been surgically resected.  No intra or extrahepatic biliary ductal dilation.    Pancreas: Normal.    Spleen: Normal.    Adrenals: Normal.    Kidneys and ureters: The kidneys enhance normally.  There is no hydronephrosis.    Bladder: There are minimal inflammatory changes around the urinary bladder.    Reproductive organs: The uterus has been surgically resected.    Stomach/bowel: Small hiatal hernia.  No evidence of bowel obstruction. Colonic diverticulosis without inflammatory changes.    Lymph nodes: No pathologically enlarged lymph node identified.    Peritoneum: No ascites or free air. No fluid collection.    Vessels: Moderate vascular calcifications.    Abdominal wall: Normal.    Lung bases: Chronic appearing interstitial changes in the lung bases.    Bones: No acute osseous findings.                                        Medications   0.9% NaCl infusion (1,000 mLs Intravenous New Bag 7/24/25 1136)   ondansetron injection 4 mg (4 mg Intravenous Given 7/24/25 1136)   iohexoL (OMNIPAQUE 350) injection 100 mL (100 mLs Intravenous Given 7/24/25 1249)   cefTRIAXone injection 1 g (1 g Intravenous Given 7/24/25 1416)     Medical Decision Making  Patient awake, alert, has non-labored breathing, and follows commands appropriately. Arrived to ED due to dysuria, abdominal pain, and diarrhea. Symptoms began 1 week ago. Reports she was dx with UTI earlier this month and treated with Macrobid. Diarrhea has been improving per patient. Afebrile. NAD Noted.     Judging by the patient's chief complaint and pertinent history, the patient has the following possible differential diagnoses, including but not limited to the following: Abdominal Pain, C. Diff, UTI, Colitis     Some of these are deemed to be lower likelihood and some more likely based on my physical exam and history combined with possible lab work and/or imaging studies. Please see the pertinent studies, and refer to the HPI. Some of these diagnoses will take further evaluation to fully rule out, perhaps as an outpatient and the patient was encouraged to follow up when discharged for more comprehensive evaluation.       Amount and/or Complexity of Data Reviewed  Labs: ordered. Decision-making details documented in ED Course.     Details: No leukocytosis noted. No electrolyte derangement noted. Informed patient of results.   Radiology: ordered. Decision-making details documented in ED Course.     Details: Informed patient of results.   Discussion of management or test interpretation with external provider(s): Discussed plan of care and interventions with patient. Agreed to and aware of plan of care. Comfortable being discharged home. Patient discharged home. Patient denies new or additional complaints; no further tests indicated at this time. Verbalized understanding of instructions.  No emergent or apparent distress noted prior to discharge. Strict ER return precautions given.       Risk  OTC drugs.  Prescription drug management.               ED Course as of 07/24/25 1423   Thu Jul 24, 2025   1229 Bacteria, UA(!): Moderate [JA]   1229 RBC, UA: 0-2 [JA]   1229 WBC, UA: 0-2 [JA]   1229 NITRITE UA: Negative [JA]   1229 WBC: 7.51 [JA]   1229 Hemoglobin(!): 11.5 [JA]   1229 Hematocrit(!): 34.4 [JA]   1230 BUN: 16.4 [JA]   1230 Creatinine: 0.97 [JA]   1355 CT Abdomen Pelvis With IV Contrast NO Oral Contrast  1. Minimal inflammatory changes around the urinary bladder, may be infectious.  2. Otherwise no acute abnormality of the abdomen or pelvis.   [JA]   1408 Discussed results with patient. Will treat UTI. Patient unable to provide stool sample while in ER. Reports since arrival symptoms have improved. At disposition, patient has no additional complaints, able to tolerate oral intake, non-toxic in appearance, and has no signs of systemic infection. Stable for [place] home.  [JA]      ED Course User Index  [JA] Cassandra Leger NP                               Clinical Impression:  Final diagnoses:  [N30.00] Acute cystitis without hematuria (Primary)  [R10.30] Lower abdominal pain          ED Disposition Condition    Discharge Good          ED Prescriptions       Medication Sig Dispense Start Date End Date Auth. Provider    cefdinir (OMNICEF) 300 MG capsule Take 1 capsule (300 mg total) by mouth every 12 (twelve) hours. for 7 days 14 capsule 7/24/2025 7/31/2025 Cassandra Leger NP    dicyclomine (BENTYL) 10 MG capsule Take 1 capsule (10 mg total) by mouth 2 (two) times daily as needed. 10 capsule 7/24/2025 -- Cassandra Leger NP          Follow-up Information       Follow up With Specialties Details Why Contact Info    Jany Magana MD Family Medicine Schedule an appointment as soon as possible for a visit in 1 day  [address] [place] [address]  [address]  [place] [address]  [phone]       [facility] - Emergency Dept Emergency Medicine Go to  If symptoms worsen, As needed [id number]  [facility] [address] [phone]                   [1]   Social History  Tobacco Use    Smoking status: Never     Passive exposure: Never    Smokeless tobacco: Never   Substance Use Topics    Alcohol use: Never    Drug use: Not Currently        Cassandra Leger NP  07/24/25 9830

## 2025-07-26 LAB — BACTERIA UR CULT: ABNORMAL

## 2025-07-28 PROBLEM — R19.7 DIARRHEA: Status: ACTIVE | Noted: 2025-07-28

## 2025-07-28 NOTE — ASSESSMENT & PLAN NOTE
+urine culture from 7/7/25 showed e coli.  Macrobid was sent in but was resistant.  She is still having symptoms.  Dysuria, weak, diarrhea, stomach cramps.  Culture shows sensitive to IV antibiotics or rocephin.  We do not have rocephin in clinic.  Due to reported symptoms, will send to ER for evaluation. Patient is agreeable and verbalized understanding.  Patient was brought to Er by clinic staff.

## 2025-07-28 NOTE — ASSESSMENT & PLAN NOTE
Lab Results   Component Value Date    HGBA1C 6.8 02/06/2025     Foot exam 7/2024  Eye exam with dr. Zapien.   Urine micro 7/2024    Encouraged compliance with dmii diet.     Was having hypoglycemic episodes on glimepiride.     on ozempic  1mg. Doing well. On statin      Contact clinic for concerns.

## 2025-07-28 NOTE — ASSESSMENT & PLAN NOTE
Labs in Er.  Will also get stool studies.  Tolerating po.  Encourage fluids and rest. Monitor symptoms. Follow brat diet. Contact clinic for concerns. Patient is agreeable to plan and verbalized understanding

## 2025-08-15 ENCOUNTER — TELEPHONE (OUTPATIENT)
Dept: INFUSION THERAPY | Facility: HOSPITAL | Age: 75
End: 2025-08-15
Payer: MEDICARE

## 2025-08-22 ENCOUNTER — LAB VISIT (OUTPATIENT)
Dept: LAB | Facility: HOSPITAL | Age: 75
End: 2025-08-22
Attending: FAMILY MEDICINE
Payer: MEDICARE

## 2025-08-22 DIAGNOSIS — N18.32 TYPE 2 DIABETES MELLITUS WITH STAGE 3B CHRONIC KIDNEY DISEASE, WITHOUT LONG-TERM CURRENT USE OF INSULIN: ICD-10-CM

## 2025-08-22 DIAGNOSIS — R19.7 DIARRHEA, UNSPECIFIED TYPE: ICD-10-CM

## 2025-08-22 DIAGNOSIS — R39.9 UTI SYMPTOMS: ICD-10-CM

## 2025-08-22 DIAGNOSIS — E11.22 TYPE 2 DIABETES MELLITUS WITH STAGE 3B CHRONIC KIDNEY DISEASE, WITHOUT LONG-TERM CURRENT USE OF INSULIN: ICD-10-CM

## 2025-08-22 DIAGNOSIS — E03.9 ACQUIRED HYPOTHYROIDISM: ICD-10-CM

## 2025-08-22 PROBLEM — C44.601: Status: RESOLVED | Noted: 2022-05-26 | Resolved: 2025-08-22

## 2025-08-22 LAB
ALBUMIN SERPL-MCNC: 3.4 G/DL (ref 3.4–4.8)
ALBUMIN/GLOB SERPL: 0.9 RATIO (ref 1.1–2)
ALP SERPL-CCNC: 91 UNIT/L (ref 40–150)
ALT SERPL-CCNC: 16 UNIT/L (ref 0–55)
ANION GAP SERPL CALC-SCNC: 7 MEQ/L
AST SERPL-CCNC: 21 UNIT/L (ref 11–45)
BASOPHILS # BLD AUTO: 0.03 X10(3)/MCL
BASOPHILS NFR BLD AUTO: 0.4 %
BILIRUB SERPL-MCNC: 0.9 MG/DL
BUN SERPL-MCNC: 22.8 MG/DL (ref 9.8–20.1)
CALCIUM SERPL-MCNC: 9.1 MG/DL (ref 8.4–10.2)
CHLORIDE SERPL-SCNC: 108 MMOL/L (ref 98–107)
CO2 SERPL-SCNC: 26 MMOL/L (ref 23–31)
CREAT SERPL-MCNC: 1.04 MG/DL (ref 0.55–1.02)
CREAT/UREA NIT SERPL: 22
EOSINOPHIL # BLD AUTO: 0.21 X10(3)/MCL (ref 0–0.9)
EOSINOPHIL NFR BLD AUTO: 2.9 %
ERYTHROCYTE [DISTWIDTH] IN BLOOD BY AUTOMATED COUNT: 13.5 % (ref 11.5–17)
EST. AVERAGE GLUCOSE BLD GHB EST-MCNC: 165.7 MG/DL
GFR SERPLBLD CREATININE-BSD FMLA CKD-EPI: 57 ML/MIN/1.73/M2
GLOBULIN SER-MCNC: 3.6 GM/DL (ref 2.4–3.5)
GLUCOSE SERPL-MCNC: 146 MG/DL (ref 82–115)
HBA1C MFR BLD: 7.4 %
HCT VFR BLD AUTO: 36.4 % (ref 37–47)
HGB BLD-MCNC: 11.9 G/DL (ref 12–16)
IMM GRANULOCYTES # BLD AUTO: 0.02 X10(3)/MCL (ref 0–0.04)
IMM GRANULOCYTES NFR BLD AUTO: 0.3 %
LYMPHOCYTES # BLD AUTO: 2.17 X10(3)/MCL (ref 0.6–4.6)
LYMPHOCYTES NFR BLD AUTO: 30 %
MCH RBC QN AUTO: 32.1 PG (ref 27–31)
MCHC RBC AUTO-ENTMCNC: 32.7 G/DL (ref 33–36)
MCV RBC AUTO: 98.1 FL (ref 80–94)
MONOCYTES # BLD AUTO: 0.47 X10(3)/MCL (ref 0.1–1.3)
MONOCYTES NFR BLD AUTO: 6.5 %
NEUTROPHILS # BLD AUTO: 4.33 X10(3)/MCL (ref 2.1–9.2)
NEUTROPHILS NFR BLD AUTO: 59.9 %
NRBC BLD AUTO-RTO: 0 %
PLATELET # BLD AUTO: 184 X10(3)/MCL (ref 130–400)
PMV BLD AUTO: 9.6 FL (ref 7.4–10.4)
POTASSIUM SERPL-SCNC: 3.7 MMOL/L (ref 3.5–5.1)
PROT SERPL-MCNC: 7 GM/DL (ref 5.8–7.6)
RBC # BLD AUTO: 3.71 X10(6)/MCL (ref 4.2–5.4)
SODIUM SERPL-SCNC: 141 MMOL/L (ref 136–145)
TSH SERPL-ACNC: 0.73 UIU/ML (ref 0.35–4.94)
WBC # BLD AUTO: 7.23 X10(3)/MCL (ref 4.5–11.5)

## 2025-08-22 PROCEDURE — 84443 ASSAY THYROID STIM HORMONE: CPT

## 2025-08-22 PROCEDURE — 80053 COMPREHEN METABOLIC PANEL: CPT

## 2025-08-22 PROCEDURE — 83036 HEMOGLOBIN GLYCOSYLATED A1C: CPT

## 2025-08-22 PROCEDURE — 36415 COLL VENOUS BLD VENIPUNCTURE: CPT

## 2025-08-22 PROCEDURE — 85025 COMPLETE CBC W/AUTO DIFF WBC: CPT

## 2025-08-25 ENCOUNTER — HOSPITAL ENCOUNTER (OUTPATIENT)
Dept: RADIOLOGY | Facility: HOSPITAL | Age: 75
Discharge: HOME | End: 2025-08-25
Attending: NURSE PRACTITIONER
Payer: MEDICARE

## 2025-08-25 DIAGNOSIS — Z12.31 ENCOUNTER FOR SCREENING MAMMOGRAM FOR BREAST CANCER: ICD-10-CM

## 2025-08-25 DIAGNOSIS — Z85.3 HISTORY OF BREAST CANCER: ICD-10-CM

## 2025-08-25 PROCEDURE — 77067 SCR MAMMO BI INCL CAD: CPT | Mod: TC,52

## 2025-08-27 ENCOUNTER — OFFICE VISIT (OUTPATIENT)
Dept: FAMILY MEDICINE | Facility: CLINIC | Age: 75
End: 2025-08-27
Payer: MEDICARE

## 2025-08-27 VITALS
SYSTOLIC BLOOD PRESSURE: 128 MMHG | DIASTOLIC BLOOD PRESSURE: 86 MMHG | WEIGHT: 152.81 LBS | TEMPERATURE: 98 F | BODY MASS INDEX: 26.09 KG/M2 | RESPIRATION RATE: 16 BRPM | HEIGHT: 64 IN | HEART RATE: 80 BPM | OXYGEN SATURATION: 98 %

## 2025-08-27 DIAGNOSIS — E11.69 HYPERLIPIDEMIA ASSOCIATED WITH TYPE 2 DIABETES MELLITUS: ICD-10-CM

## 2025-08-27 DIAGNOSIS — F32.A DEPRESSIVE DISORDER: ICD-10-CM

## 2025-08-27 DIAGNOSIS — E11.59 HYPERTENSION ASSOCIATED WITH DIABETES: ICD-10-CM

## 2025-08-27 DIAGNOSIS — Z12.31 BREAST CANCER SCREENING BY MAMMOGRAM: ICD-10-CM

## 2025-08-27 DIAGNOSIS — I15.2 HYPERTENSION ASSOCIATED WITH DIABETES: ICD-10-CM

## 2025-08-27 DIAGNOSIS — E11.22 TYPE 2 DIABETES MELLITUS WITH STAGE 3B CHRONIC KIDNEY DISEASE, WITHOUT LONG-TERM CURRENT USE OF INSULIN: ICD-10-CM

## 2025-08-27 DIAGNOSIS — E78.5 HYPERLIPIDEMIA ASSOCIATED WITH TYPE 2 DIABETES MELLITUS: ICD-10-CM

## 2025-08-27 DIAGNOSIS — Z78.0 POSTMENOPAUSAL: ICD-10-CM

## 2025-08-27 DIAGNOSIS — F41.9 ANXIETY: ICD-10-CM

## 2025-08-27 DIAGNOSIS — E03.9 ACQUIRED HYPOTHYROIDISM: ICD-10-CM

## 2025-08-27 DIAGNOSIS — Z00.00 WELLNESS EXAMINATION: Primary | ICD-10-CM

## 2025-08-27 DIAGNOSIS — Z85.3 HISTORY OF BREAST CANCER: ICD-10-CM

## 2025-08-27 DIAGNOSIS — R39.9 UTI SYMPTOMS: ICD-10-CM

## 2025-08-27 DIAGNOSIS — A60.00 GENITAL HERPES SIMPLEX, UNSPECIFIED SITE: ICD-10-CM

## 2025-08-27 DIAGNOSIS — M81.0 AGE-RELATED OSTEOPOROSIS WITHOUT CURRENT PATHOLOGICAL FRACTURE: ICD-10-CM

## 2025-08-27 DIAGNOSIS — Z71.89 ADVANCED CARE PLANNING/COUNSELING DISCUSSION: ICD-10-CM

## 2025-08-27 DIAGNOSIS — K21.9 GASTROESOPHAGEAL REFLUX DISEASE WITHOUT ESOPHAGITIS: ICD-10-CM

## 2025-08-27 DIAGNOSIS — I25.110 CORONARY ARTERY DISEASE INVOLVING NATIVE CORONARY ARTERY OF NATIVE HEART WITH UNSTABLE ANGINA PECTORIS: ICD-10-CM

## 2025-08-27 DIAGNOSIS — Z85.828 HISTORY OF SKIN CANCER: ICD-10-CM

## 2025-08-27 DIAGNOSIS — N18.32 STAGE 3B CHRONIC KIDNEY DISEASE: ICD-10-CM

## 2025-08-27 DIAGNOSIS — N18.32 TYPE 2 DIABETES MELLITUS WITH STAGE 3B CHRONIC KIDNEY DISEASE, WITHOUT LONG-TERM CURRENT USE OF INSULIN: ICD-10-CM

## 2025-08-27 PROBLEM — H66.91 RIGHT OTITIS MEDIA: Status: RESOLVED | Noted: 2025-02-19 | Resolved: 2025-08-27

## 2025-08-27 PROBLEM — A08.4 VIRAL GASTROENTERITIS: Status: RESOLVED | Noted: 2025-02-19 | Resolved: 2025-08-27

## 2025-08-27 LAB
ALBUMIN/CREAT UR: 8.1 MG/GM CR (ref 0–30)
BACTERIA #/AREA URNS AUTO: ABNORMAL /HPF
BILIRUB UR QL STRIP.AUTO: NEGATIVE
CLARITY UR: CLEAR
COLOR UR AUTO: YELLOW
CREAT UR-MCNC: 109.8 MG/DL (ref 45–106)
GLUCOSE UR QL STRIP: NORMAL
HGB UR QL STRIP: NEGATIVE
KETONES UR QL STRIP: NEGATIVE
LEUKOCYTE ESTERASE UR QL STRIP: NEGATIVE
MICROALBUMIN UR-MCNC: 8.9 UG/ML
MUCOUS THREADS URNS QL MICRO: ABNORMAL /LPF
NITRITE UR QL STRIP: NEGATIVE
PH UR STRIP: 6 [PH]
PROT UR QL STRIP: NEGATIVE
RBC #/AREA URNS AUTO: ABNORMAL /HPF
SP GR UR STRIP.AUTO: 1.02 (ref 1–1.03)
SQUAMOUS #/AREA URNS LPF: ABNORMAL /HPF
UROBILINOGEN UR STRIP-ACNC: NORMAL
WBC #/AREA URNS AUTO: ABNORMAL /HPF

## 2025-08-27 PROCEDURE — 82570 ASSAY OF URINE CREATININE: CPT | Performed by: FAMILY MEDICINE

## 2025-08-27 PROCEDURE — 81001 URINALYSIS AUTO W/SCOPE: CPT | Performed by: FAMILY MEDICINE

## 2025-08-27 RX ORDER — GLIPIZIDE 2.5 MG/1
2.5 TABLET, EXTENDED RELEASE ORAL
COMMUNITY
Start: 2025-08-02

## 2025-08-27 RX ORDER — MECLIZINE HYDROCHLORIDE 25 MG/1
25 TABLET ORAL
COMMUNITY
Start: 2025-08-20

## 2025-08-27 RX ORDER — SUCRALFATE 1 G/1
1 TABLET ORAL
COMMUNITY
Start: 2025-08-01 | End: 2025-08-27

## 2025-08-27 RX ORDER — CLONAZEPAM 0.5 MG/1
0.5 TABLET ORAL DAILY PRN
COMMUNITY
Start: 2025-08-12 | End: 2025-08-27

## 2025-08-27 RX ORDER — FLASH GLUCOSE SENSOR
KIT MISCELLANEOUS
Qty: 2 KIT | Refills: 99 | Status: SHIPPED | OUTPATIENT
Start: 2025-08-27

## 2025-08-27 RX ORDER — HYDROXYZINE HYDROCHLORIDE 10 MG/1
10 TABLET, FILM COATED ORAL EVERY 8 HOURS PRN
COMMUNITY
Start: 2025-08-01 | End: 2025-08-27

## 2025-08-27 RX ORDER — NITROGLYCERIN 0.4 MG/1
0.4 TABLET SUBLINGUAL
COMMUNITY
Start: 2025-08-01

## 2025-08-27 RX ORDER — SODIUM, POTASSIUM,MAG SULFATES 17.5-3.13G
SOLUTION, RECONSTITUTED, ORAL ORAL
COMMUNITY
Start: 2025-08-14 | End: 2025-08-27

## 2025-08-27 RX ORDER — BUPROPION HYDROCHLORIDE 100 MG/1
100 TABLET, EXTENDED RELEASE ORAL 2 TIMES DAILY
Qty: 180 TABLET | Refills: 3 | Status: SHIPPED | OUTPATIENT
Start: 2025-08-27 | End: 2025-08-28

## 2025-08-27 RX ORDER — TIRZEPATIDE 2.5 MG/.5ML
2.5 INJECTION, SOLUTION SUBCUTANEOUS
Qty: 6 ML | Refills: 0 | Status: SHIPPED | OUTPATIENT
Start: 2025-08-27 | End: 2025-11-25

## 2025-08-27 RX ORDER — CEFDINIR 300 MG/1
300 CAPSULE ORAL EVERY 12 HOURS
Qty: 14 CAPSULE | Refills: 0 | Status: SHIPPED | OUTPATIENT
Start: 2025-08-27 | End: 2025-09-03

## 2025-08-27 RX ORDER — CITALOPRAM 20 MG/1
20 TABLET ORAL
COMMUNITY
Start: 2025-08-20 | End: 2025-08-27

## 2025-08-28 ENCOUNTER — RESULTS FOLLOW-UP (OUTPATIENT)
Dept: FAMILY MEDICINE | Facility: CLINIC | Age: 75
End: 2025-08-28
Payer: MEDICARE

## 2025-08-28 DIAGNOSIS — F32.A DEPRESSIVE DISORDER: ICD-10-CM

## 2025-08-28 DIAGNOSIS — F41.9 ANXIETY: ICD-10-CM

## 2025-08-28 RX ORDER — BUPROPION HYDROCHLORIDE 100 MG/1
100 TABLET, FILM COATED ORAL 2 TIMES DAILY
Qty: 90 TABLET | Refills: 3 | Status: SHIPPED | OUTPATIENT
Start: 2025-08-28

## 2025-09-05 PROBLEM — H81.10 BPPV (BENIGN PAROXYSMAL POSITIONAL VERTIGO): Status: ACTIVE | Noted: 2025-04-13

## 2025-09-05 PROBLEM — F41.9 ANXIETY AND DEPRESSION: Status: ACTIVE | Noted: 2022-05-26

## 2025-09-05 PROBLEM — N18.9 CKD (CHRONIC KIDNEY DISEASE): Status: ACTIVE | Noted: 2025-08-01

## 2025-09-05 PROBLEM — K31.84 GASTROPARESIS: Status: ACTIVE | Noted: 2025-08-01

## 2025-09-05 PROBLEM — I25.10 CAD (CORONARY ARTERY DISEASE): Status: ACTIVE | Noted: 2023-10-11
